# Patient Record
Sex: FEMALE | Race: BLACK OR AFRICAN AMERICAN | Employment: OTHER | URBAN - METROPOLITAN AREA
[De-identification: names, ages, dates, MRNs, and addresses within clinical notes are randomized per-mention and may not be internally consistent; named-entity substitution may affect disease eponyms.]

---

## 2023-11-12 ENCOUNTER — HOSPITAL ENCOUNTER (INPATIENT)
Age: 63
LOS: 1 days | Discharge: HOME | DRG: 113 | End: 2023-11-14
Attending: EMERGENCY MEDICINE | Admitting: INTERNAL MEDICINE
Payer: COMMERCIAL

## 2023-11-12 DIAGNOSIS — J44.1 COPD WITH ACUTE EXACERBATION (HCC): Primary | ICD-10-CM

## 2023-11-12 DIAGNOSIS — R06.89 HYPERCARBIA: ICD-10-CM

## 2023-11-12 DIAGNOSIS — D72.829 LEUKOCYTOSIS, UNSPECIFIED TYPE: ICD-10-CM

## 2023-11-12 DIAGNOSIS — E87.20 LACTIC ACIDEMIA: ICD-10-CM

## 2023-11-12 PROCEDURE — 99285 EMERGENCY DEPT VISIT HI MDM: CPT

## 2023-11-12 PROCEDURE — 96375 TX/PRO/DX INJ NEW DRUG ADDON: CPT

## 2023-11-12 PROCEDURE — 93005 ELECTROCARDIOGRAM TRACING: CPT | Performed by: STUDENT IN AN ORGANIZED HEALTH CARE EDUCATION/TRAINING PROGRAM

## 2023-11-12 PROCEDURE — 96365 THER/PROPH/DIAG IV INF INIT: CPT

## 2023-11-12 PROCEDURE — 96368 THER/DIAG CONCURRENT INF: CPT

## 2023-11-12 RX ORDER — IPRATROPIUM BROMIDE AND ALBUTEROL SULFATE 2.5; .5 MG/3ML; MG/3ML
SOLUTION RESPIRATORY (INHALATION)
Status: DISCONTINUED
Start: 2023-11-12 | End: 2023-11-13

## 2023-11-12 RX ORDER — ALBUTEROL SULFATE 2.5 MG/3ML
2.5 SOLUTION RESPIRATORY (INHALATION)
Status: COMPLETED | OUTPATIENT
Start: 2023-11-13 | End: 2023-11-13

## 2023-11-12 RX ORDER — IPRATROPIUM BROMIDE AND ALBUTEROL SULFATE 2.5; .5 MG/3ML; MG/3ML
1 SOLUTION RESPIRATORY (INHALATION) ONCE
Status: COMPLETED | OUTPATIENT
Start: 2023-11-13 | End: 2023-11-13

## 2023-11-13 ENCOUNTER — APPOINTMENT (OUTPATIENT)
Dept: CT IMAGING | Age: 63
DRG: 113 | End: 2023-11-13
Payer: COMMERCIAL

## 2023-11-13 ENCOUNTER — APPOINTMENT (OUTPATIENT)
Dept: GENERAL RADIOLOGY | Age: 63
DRG: 113 | End: 2023-11-13
Payer: COMMERCIAL

## 2023-11-13 PROBLEM — J44.1 COPD EXACERBATION (HCC): Status: ACTIVE | Noted: 2023-11-13

## 2023-11-13 LAB
ALBUMIN SERPL-MCNC: 3.9 G/DL (ref 3.4–5)
ALBUMIN SERPL-MCNC: 4 G/DL (ref 3.4–5)
ALP SERPL-CCNC: 83 U/L (ref 40–129)
ALP SERPL-CCNC: 89 U/L (ref 40–129)
ALT SERPL-CCNC: 238 U/L (ref 10–40)
ALT SERPL-CCNC: 301 U/L (ref 10–40)
ANION GAP SERPL CALCULATED.3IONS-SCNC: 15 MMOL/L (ref 3–16)
ANION GAP SERPL CALCULATED.3IONS-SCNC: 7 MMOL/L (ref 3–16)
AST SERPL-CCNC: 340 U/L (ref 15–37)
AST SERPL-CCNC: 388 U/L (ref 15–37)
BASE EXCESS BLDV CALC-SCNC: -2.6 MMOL/L (ref -2–3)
BASE EXCESS BLDV CALC-SCNC: -7 MMOL/L (ref -2–3)
BASOPHILS # BLD: 0 K/UL (ref 0–0.2)
BASOPHILS # BLD: 0 K/UL (ref 0–0.2)
BASOPHILS NFR BLD: 0 %
BASOPHILS NFR BLD: 0.1 %
BILIRUB DIRECT SERPL-MCNC: <0.2 MG/DL (ref 0–0.3)
BILIRUB DIRECT SERPL-MCNC: <0.2 MG/DL (ref 0–0.3)
BILIRUB INDIRECT SERPL-MCNC: ABNORMAL MG/DL (ref 0–1)
BILIRUB INDIRECT SERPL-MCNC: ABNORMAL MG/DL (ref 0–1)
BILIRUB SERPL-MCNC: <0.2 MG/DL (ref 0–1)
BILIRUB SERPL-MCNC: <0.2 MG/DL (ref 0–1)
BILIRUB UR QL STRIP.AUTO: NEGATIVE
BUN SERPL-MCNC: 10 MG/DL (ref 7–20)
BUN SERPL-MCNC: 13 MG/DL (ref 7–20)
CALCIUM SERPL-MCNC: 8.3 MG/DL (ref 8.3–10.6)
CALCIUM SERPL-MCNC: 8.6 MG/DL (ref 8.3–10.6)
CHLORIDE SERPL-SCNC: 104 MMOL/L (ref 99–110)
CHLORIDE SERPL-SCNC: 99 MMOL/L (ref 99–110)
CK SERPL-CCNC: 155 U/L (ref 26–192)
CK SERPL-CCNC: 199 U/L (ref 26–192)
CLARITY UR: CLEAR
CO2 BLDV-SCNC: 27 MMOL/L
CO2 BLDV-SCNC: 29 MMOL/L
CO2 SERPL-SCNC: 23 MMOL/L (ref 21–32)
CO2 SERPL-SCNC: 26 MMOL/L (ref 21–32)
COHGB MFR BLDV: 4.7 % (ref 0–1.5)
COHGB MFR BLDV: 5.4 % (ref 0–1.5)
COLOR UR: YELLOW
CREAT SERPL-MCNC: 0.7 MG/DL (ref 0.6–1.2)
CREAT SERPL-MCNC: 0.9 MG/DL (ref 0.6–1.2)
DEPRECATED RDW RBC AUTO: 18.3 % (ref 12.4–15.4)
DEPRECATED RDW RBC AUTO: 19.4 % (ref 12.4–15.4)
DO-HGB MFR BLDV: 11.6 %
DO-HGB MFR BLDV: 46.4 %
EKG ATRIAL RATE: 123 BPM
EKG DIAGNOSIS: NORMAL
EKG P AXIS: 69 DEGREES
EKG P-R INTERVAL: 148 MS
EKG Q-T INTERVAL: 304 MS
EKG QRS DURATION: 68 MS
EKG QTC CALCULATION (BAZETT): 435 MS
EKG R AXIS: 30 DEGREES
EKG T AXIS: 72 DEGREES
EKG VENTRICULAR RATE: 123 BPM
EOSINOPHIL # BLD: 0 K/UL (ref 0–0.6)
EOSINOPHIL # BLD: 0 K/UL (ref 0–0.6)
EOSINOPHIL NFR BLD: 0 %
EOSINOPHIL NFR BLD: 0.1 %
EPI CELLS #/AREA URNS HPF: NORMAL /HPF (ref 0–5)
ETHANOLAMINE SERPL-MCNC: NORMAL MG/DL (ref 0–0.08)
FLUAV RNA RESP QL NAA+PROBE: NOT DETECTED
FLUBV RNA RESP QL NAA+PROBE: NOT DETECTED
GFR SERPLBLD CREATININE-BSD FMLA CKD-EPI: >60 ML/MIN/{1.73_M2}
GFR SERPLBLD CREATININE-BSD FMLA CKD-EPI: >60 ML/MIN/{1.73_M2}
GLUCOSE BLD-MCNC: 167 MG/DL (ref 70–99)
GLUCOSE BLD-MCNC: 210 MG/DL (ref 70–99)
GLUCOSE BLD-MCNC: 213 MG/DL (ref 70–99)
GLUCOSE BLD-MCNC: 250 MG/DL (ref 70–99)
GLUCOSE SERPL-MCNC: 191 MG/DL (ref 70–99)
GLUCOSE SERPL-MCNC: 250 MG/DL (ref 70–99)
GLUCOSE UR STRIP.AUTO-MCNC: NEGATIVE MG/DL
HBV SURFACE AB SERPL IA-ACNC: <3.5 MIU/ML
HBV SURFACE AG SERPL QL IA: NORMAL
HCO3 BLDV-SCNC: 24.4 MMOL/L (ref 24–28)
HCO3 BLDV-SCNC: 26.9 MMOL/L (ref 24–28)
HCT VFR BLD AUTO: 34.8 % (ref 36–48)
HCT VFR BLD AUTO: 37.8 % (ref 36–48)
HCV AB SERPL QL IA: NORMAL
HGB BLD-MCNC: 10.8 G/DL (ref 12–16)
HGB BLD-MCNC: 11.9 G/DL (ref 12–16)
HGB UR QL STRIP.AUTO: NEGATIVE
INR PPP: 1.01 (ref 0.84–1.16)
KETONES UR STRIP.AUTO-MCNC: NEGATIVE MG/DL
LACTATE BLDV-SCNC: 2 MMOL/L (ref 0.4–2)
LACTATE BLDV-SCNC: 2.4 MMOL/L (ref 0.4–1.9)
LACTATE BLDV-SCNC: 4.9 MMOL/L (ref 0.4–1.9)
LEUKOCYTE ESTERASE UR QL STRIP.AUTO: NEGATIVE
LIPASE SERPL-CCNC: 26 U/L (ref 13–60)
LYMPHOCYTES # BLD: 0.6 K/UL (ref 1–5.1)
LYMPHOCYTES # BLD: 8.7 K/UL (ref 1–5.1)
LYMPHOCYTES NFR BLD: 4.6 %
LYMPHOCYTES NFR BLD: 57 %
MACROCYTES BLD QL SMEAR: ABNORMAL
MCH RBC QN AUTO: 23 PG (ref 26–34)
MCH RBC QN AUTO: 23.5 PG (ref 26–34)
MCHC RBC AUTO-ENTMCNC: 31.1 G/DL (ref 31–36)
MCHC RBC AUTO-ENTMCNC: 31.3 G/DL (ref 31–36)
MCV RBC AUTO: 73.9 FL (ref 80–100)
MCV RBC AUTO: 75.1 FL (ref 80–100)
METHGB MFR BLDV: 0.4 % (ref 0–1.5)
METHGB MFR BLDV: 0.7 % (ref 0–1.5)
MONOCYTES # BLD: 0.1 K/UL (ref 0–1.3)
MONOCYTES # BLD: 0.5 K/UL (ref 0–1.3)
MONOCYTES NFR BLD: 1.1 %
MONOCYTES NFR BLD: 4 %
NEUTROPHILS # BLD: 12.1 K/UL (ref 1.7–7.7)
NEUTROPHILS # BLD: 4 K/UL (ref 1.7–7.7)
NEUTROPHILS NFR BLD: 30 %
NEUTROPHILS NFR BLD: 94.1 %
NITRITE UR QL STRIP.AUTO: NEGATIVE
NT-PROBNP SERPL-MCNC: 179 PG/ML (ref 0–124)
OVALOCYTES BLD QL SMEAR: ABNORMAL
PATH INTERP BLD-IMP: NORMAL
PATH INTERP BLD-IMP: YES
PCO2 BLDV: 71.4 MMHG (ref 41–51)
PCO2 BLDV: 80.8 MMHG (ref 41–51)
PERFORMED ON: ABNORMAL
PH BLDV: 7.09 [PH] (ref 7.35–7.45)
PH BLDV: 7.18 [PH] (ref 7.35–7.45)
PH UR STRIP.AUTO: 6 [PH] (ref 5–8)
PLATELET # BLD AUTO: 250 K/UL (ref 135–450)
PLATELET # BLD AUTO: 312 K/UL (ref 135–450)
PMV BLD AUTO: 7.6 FL (ref 5–10.5)
PMV BLD AUTO: 7.6 FL (ref 5–10.5)
PO2 BLDV: 34.5 MMHG (ref 25–40)
PO2 BLDV: 72.7 MMHG (ref 25–40)
POTASSIUM SERPL-SCNC: 4.6 MMOL/L (ref 3.5–5.1)
POTASSIUM SERPL-SCNC: 4.9 MMOL/L (ref 3.5–5.1)
PROCALCITONIN SERPL IA-MCNC: 0.04 NG/ML (ref 0–0.15)
PROT SERPL-MCNC: 6.4 G/DL (ref 6.4–8.2)
PROT SERPL-MCNC: 6.5 G/DL (ref 6.4–8.2)
PROT UR STRIP.AUTO-MCNC: 30 MG/DL
PROTHROMBIN TIME: 13.3 SEC (ref 11.5–14.8)
RBC # BLD AUTO: 4.71 M/UL (ref 4–5.2)
RBC # BLD AUTO: 5.04 M/UL (ref 4–5.2)
RBC #/AREA URNS HPF: NORMAL /HPF (ref 0–4)
SAO2 % BLDV: 51 %
SAO2 % BLDV: 88 %
SARS-COV-2 RNA RESP QL NAA+PROBE: NOT DETECTED
SLIDE REVIEW: ABNORMAL
SODIUM SERPL-SCNC: 137 MMOL/L (ref 136–145)
SODIUM SERPL-SCNC: 137 MMOL/L (ref 136–145)
SP GR UR STRIP.AUTO: >=1.03 (ref 1–1.03)
TROPONIN, HIGH SENSITIVITY: 12 NG/L (ref 0–14)
TROPONIN, HIGH SENSITIVITY: 19 NG/L (ref 0–14)
UA COMPLETE W REFLEX CULTURE PNL UR: ABNORMAL
UA DIPSTICK W REFLEX MICRO PNL UR: YES
URN SPEC COLLECT METH UR: ABNORMAL
UROBILINOGEN UR STRIP-ACNC: 0.2 E.U./DL
VARIANT LYMPHS NFR BLD MANUAL: 9 % (ref 0–6)
WBC # BLD AUTO: 12.8 K/UL (ref 4–11)
WBC # BLD AUTO: 13.2 K/UL (ref 4–11)
WBC #/AREA URNS HPF: NORMAL /HPF (ref 0–5)

## 2023-11-13 PROCEDURE — 2580000003 HC RX 258: Performed by: STUDENT IN AN ORGANIZED HEALTH CARE EDUCATION/TRAINING PROGRAM

## 2023-11-13 PROCEDURE — 86803 HEPATITIS C AB TEST: CPT

## 2023-11-13 PROCEDURE — 6370000000 HC RX 637 (ALT 250 FOR IP)

## 2023-11-13 PROCEDURE — 85025 COMPLETE CBC W/AUTO DIFF WBC: CPT

## 2023-11-13 PROCEDURE — 6360000002 HC RX W HCPCS: Performed by: STUDENT IN AN ORGANIZED HEALTH CARE EDUCATION/TRAINING PROGRAM

## 2023-11-13 PROCEDURE — 36415 COLL VENOUS BLD VENIPUNCTURE: CPT

## 2023-11-13 PROCEDURE — 81001 URINALYSIS AUTO W/SCOPE: CPT

## 2023-11-13 PROCEDURE — 70450 CT HEAD/BRAIN W/O DYE: CPT

## 2023-11-13 PROCEDURE — 71045 X-RAY EXAM CHEST 1 VIEW: CPT

## 2023-11-13 PROCEDURE — 85610 PROTHROMBIN TIME: CPT

## 2023-11-13 PROCEDURE — 80076 HEPATIC FUNCTION PANEL: CPT

## 2023-11-13 PROCEDURE — 84484 ASSAY OF TROPONIN QUANT: CPT

## 2023-11-13 PROCEDURE — 82077 ASSAY SPEC XCP UR&BREATH IA: CPT

## 2023-11-13 PROCEDURE — 86706 HEP B SURFACE ANTIBODY: CPT

## 2023-11-13 PROCEDURE — 84145 PROCALCITONIN (PCT): CPT

## 2023-11-13 PROCEDURE — 83690 ASSAY OF LIPASE: CPT

## 2023-11-13 PROCEDURE — 2060000000 HC ICU INTERMEDIATE R&B

## 2023-11-13 PROCEDURE — 83880 ASSAY OF NATRIURETIC PEPTIDE: CPT

## 2023-11-13 PROCEDURE — 2700000000 HC OXYGEN THERAPY PER DAY

## 2023-11-13 PROCEDURE — 6370000000 HC RX 637 (ALT 250 FOR IP): Performed by: STUDENT IN AN ORGANIZED HEALTH CARE EDUCATION/TRAINING PROGRAM

## 2023-11-13 PROCEDURE — 82803 BLOOD GASES ANY COMBINATION: CPT

## 2023-11-13 PROCEDURE — 87040 BLOOD CULTURE FOR BACTERIA: CPT

## 2023-11-13 PROCEDURE — 83605 ASSAY OF LACTIC ACID: CPT

## 2023-11-13 PROCEDURE — 80048 BASIC METABOLIC PNL TOTAL CA: CPT

## 2023-11-13 PROCEDURE — 6370000000 HC RX 637 (ALT 250 FOR IP): Performed by: INTERNAL MEDICINE

## 2023-11-13 PROCEDURE — 94640 AIRWAY INHALATION TREATMENT: CPT

## 2023-11-13 PROCEDURE — 94761 N-INVAS EAR/PLS OXIMETRY MLT: CPT

## 2023-11-13 PROCEDURE — 86704 HEP B CORE ANTIBODY TOTAL: CPT

## 2023-11-13 PROCEDURE — 87636 SARSCOV2 & INF A&B AMP PRB: CPT

## 2023-11-13 PROCEDURE — 87340 HEPATITIS B SURFACE AG IA: CPT

## 2023-11-13 PROCEDURE — 82550 ASSAY OF CK (CPK): CPT

## 2023-11-13 RX ORDER — ACETAMINOPHEN 325 MG/1
650 TABLET ORAL ONCE
Status: COMPLETED | OUTPATIENT
Start: 2023-11-13 | End: 2023-11-13

## 2023-11-13 RX ORDER — INSULIN LISPRO 100 [IU]/ML
0-4 INJECTION, SOLUTION INTRAVENOUS; SUBCUTANEOUS NIGHTLY
Status: DISCONTINUED | OUTPATIENT
Start: 2023-11-13 | End: 2023-11-14 | Stop reason: HOSPADM

## 2023-11-13 RX ORDER — PREDNISONE 20 MG/1
40 TABLET ORAL DAILY
Status: DISCONTINUED | OUTPATIENT
Start: 2023-11-14 | End: 2023-11-14 | Stop reason: HOSPADM

## 2023-11-13 RX ORDER — MECOBALAMIN 5000 MCG
5 TABLET,DISINTEGRATING ORAL NIGHTLY
Status: DISCONTINUED | OUTPATIENT
Start: 2023-11-13 | End: 2023-11-14 | Stop reason: HOSPADM

## 2023-11-13 RX ORDER — SODIUM CHLORIDE, SODIUM LACTATE, POTASSIUM CHLORIDE, AND CALCIUM CHLORIDE .6; .31; .03; .02 G/100ML; G/100ML; G/100ML; G/100ML
1000 INJECTION, SOLUTION INTRAVENOUS ONCE
Status: COMPLETED | OUTPATIENT
Start: 2023-11-13 | End: 2023-11-13

## 2023-11-13 RX ORDER — ONDANSETRON 2 MG/ML
4 INJECTION INTRAMUSCULAR; INTRAVENOUS EVERY 6 HOURS PRN
Status: DISCONTINUED | OUTPATIENT
Start: 2023-11-13 | End: 2023-11-14 | Stop reason: HOSPADM

## 2023-11-13 RX ORDER — IPRATROPIUM BROMIDE AND ALBUTEROL SULFATE 2.5; .5 MG/3ML; MG/3ML
1 SOLUTION RESPIRATORY (INHALATION)
Status: DISCONTINUED | OUTPATIENT
Start: 2023-11-13 | End: 2023-11-14 | Stop reason: HOSPADM

## 2023-11-13 RX ORDER — POLYETHYLENE GLYCOL 3350 17 G/17G
17 POWDER, FOR SOLUTION ORAL DAILY PRN
Status: DISCONTINUED | OUTPATIENT
Start: 2023-11-13 | End: 2023-11-14 | Stop reason: HOSPADM

## 2023-11-13 RX ORDER — ONDANSETRON 4 MG/1
4 TABLET, ORALLY DISINTEGRATING ORAL EVERY 8 HOURS PRN
Status: DISCONTINUED | OUTPATIENT
Start: 2023-11-13 | End: 2023-11-14 | Stop reason: HOSPADM

## 2023-11-13 RX ORDER — ONDANSETRON 2 MG/ML
4 INJECTION INTRAMUSCULAR; INTRAVENOUS ONCE
Status: COMPLETED | OUTPATIENT
Start: 2023-11-13 | End: 2023-11-13

## 2023-11-13 RX ORDER — ARFORMOTEROL TARTRATE 15 UG/2ML
15 SOLUTION RESPIRATORY (INHALATION)
Status: DISCONTINUED | OUTPATIENT
Start: 2023-11-13 | End: 2023-11-14 | Stop reason: HOSPADM

## 2023-11-13 RX ORDER — KETOROLAC TROMETHAMINE 30 MG/ML
15 INJECTION, SOLUTION INTRAMUSCULAR; INTRAVENOUS ONCE
Status: COMPLETED | OUTPATIENT
Start: 2023-11-13 | End: 2023-11-13

## 2023-11-13 RX ORDER — ATORVASTATIN CALCIUM 40 MG/1
40 TABLET, FILM COATED ORAL DAILY
COMMUNITY
Start: 2016-04-25

## 2023-11-13 RX ORDER — DEXTROSE MONOHYDRATE 100 MG/ML
INJECTION, SOLUTION INTRAVENOUS CONTINUOUS PRN
Status: DISCONTINUED | OUTPATIENT
Start: 2023-11-13 | End: 2023-11-14 | Stop reason: HOSPADM

## 2023-11-13 RX ORDER — SODIUM CHLORIDE 9 MG/ML
INJECTION, SOLUTION INTRAVENOUS PRN
Status: DISCONTINUED | OUTPATIENT
Start: 2023-11-13 | End: 2023-11-14 | Stop reason: HOSPADM

## 2023-11-13 RX ORDER — ENOXAPARIN SODIUM 100 MG/ML
40 INJECTION SUBCUTANEOUS DAILY
Status: DISCONTINUED | OUTPATIENT
Start: 2023-11-13 | End: 2023-11-14 | Stop reason: HOSPADM

## 2023-11-13 RX ORDER — ACETAMINOPHEN 650 MG/1
650 SUPPOSITORY RECTAL EVERY 6 HOURS PRN
Status: DISCONTINUED | OUTPATIENT
Start: 2023-11-13 | End: 2023-11-14 | Stop reason: HOSPADM

## 2023-11-13 RX ORDER — INSULIN LISPRO 100 [IU]/ML
0-4 INJECTION, SOLUTION INTRAVENOUS; SUBCUTANEOUS
Status: DISCONTINUED | OUTPATIENT
Start: 2023-11-13 | End: 2023-11-14 | Stop reason: HOSPADM

## 2023-11-13 RX ORDER — ACETAMINOPHEN 325 MG/1
650 TABLET ORAL EVERY 6 HOURS PRN
Status: DISCONTINUED | OUTPATIENT
Start: 2023-11-13 | End: 2023-11-14 | Stop reason: HOSPADM

## 2023-11-13 RX ORDER — AZITHROMYCIN 250 MG/1
250 TABLET, FILM COATED ORAL DAILY
Status: DISCONTINUED | OUTPATIENT
Start: 2023-11-13 | End: 2023-11-14 | Stop reason: HOSPADM

## 2023-11-13 RX ORDER — BUDESONIDE 0.5 MG/2ML
0.5 INHALANT ORAL
Status: DISCONTINUED | OUTPATIENT
Start: 2023-11-13 | End: 2023-11-14 | Stop reason: HOSPADM

## 2023-11-13 RX ORDER — SODIUM CHLORIDE 0.9 % (FLUSH) 0.9 %
5-40 SYRINGE (ML) INJECTION PRN
Status: DISCONTINUED | OUTPATIENT
Start: 2023-11-13 | End: 2023-11-14 | Stop reason: HOSPADM

## 2023-11-13 RX ORDER — SODIUM CHLORIDE 0.9 % (FLUSH) 0.9 %
5-40 SYRINGE (ML) INJECTION EVERY 12 HOURS SCHEDULED
Status: DISCONTINUED | OUTPATIENT
Start: 2023-11-13 | End: 2023-11-14 | Stop reason: HOSPADM

## 2023-11-13 RX ORDER — MAGNESIUM SULFATE IN WATER 40 MG/ML
2000 INJECTION, SOLUTION INTRAVENOUS ONCE
Status: COMPLETED | OUTPATIENT
Start: 2023-11-13 | End: 2023-11-13

## 2023-11-13 RX ADMIN — SODIUM CHLORIDE, POTASSIUM CHLORIDE, SODIUM LACTATE AND CALCIUM CHLORIDE 1000 ML: 600; 310; 30; 20 INJECTION, SOLUTION INTRAVENOUS at 00:17

## 2023-11-13 RX ADMIN — ARFORMOTEROL TARTRATE 15 MCG: 15 SOLUTION RESPIRATORY (INHALATION) at 07:54

## 2023-11-13 RX ADMIN — BUDESONIDE 500 MCG: 0.5 SUSPENSION RESPIRATORY (INHALATION) at 07:54

## 2023-11-13 RX ADMIN — INSULIN LISPRO 1 UNITS: 100 INJECTION, SOLUTION INTRAVENOUS; SUBCUTANEOUS at 08:31

## 2023-11-13 RX ADMIN — ONDANSETRON 4 MG: 2 INJECTION INTRAMUSCULAR; INTRAVENOUS at 00:17

## 2023-11-13 RX ADMIN — KETOROLAC TROMETHAMINE 15 MG: 30 INJECTION, SOLUTION INTRAMUSCULAR; INTRAVENOUS at 01:09

## 2023-11-13 RX ADMIN — IPRATROPIUM BROMIDE AND ALBUTEROL SULFATE 1 DOSE: 2.5; .5 SOLUTION RESPIRATORY (INHALATION) at 21:25

## 2023-11-13 RX ADMIN — ARFORMOTEROL TARTRATE 15 MCG: 15 SOLUTION RESPIRATORY (INHALATION) at 21:25

## 2023-11-13 RX ADMIN — IPRATROPIUM BROMIDE AND ALBUTEROL SULFATE 1 DOSE: 2.5; .5 SOLUTION RESPIRATORY (INHALATION) at 07:54

## 2023-11-13 RX ADMIN — BUDESONIDE 500 MCG: 0.5 SUSPENSION RESPIRATORY (INHALATION) at 21:26

## 2023-11-13 RX ADMIN — IPRATROPIUM BROMIDE AND ALBUTEROL SULFATE 1 DOSE: 2.5; .5 SOLUTION RESPIRATORY (INHALATION) at 15:10

## 2023-11-13 RX ADMIN — Medication 5 MG: at 19:54

## 2023-11-13 RX ADMIN — MAGNESIUM SULFATE HEPTAHYDRATE 2000 MG: 40 INJECTION, SOLUTION INTRAVENOUS at 00:25

## 2023-11-13 RX ADMIN — SODIUM CHLORIDE, POTASSIUM CHLORIDE, SODIUM LACTATE AND CALCIUM CHLORIDE 1000 ML: 600; 310; 30; 20 INJECTION, SOLUTION INTRAVENOUS at 01:15

## 2023-11-13 RX ADMIN — ENOXAPARIN SODIUM 40 MG: 100 INJECTION SUBCUTANEOUS at 08:31

## 2023-11-13 RX ADMIN — IPRATROPIUM BROMIDE AND ALBUTEROL SULFATE 1 DOSE: 2.5; .5 SOLUTION RESPIRATORY (INHALATION) at 11:57

## 2023-11-13 RX ADMIN — ALBUTEROL SULFATE 2.5 MG: 2.5 SOLUTION RESPIRATORY (INHALATION) at 00:10

## 2023-11-13 RX ADMIN — SODIUM CHLORIDE, PRESERVATIVE FREE 10 ML: 5 INJECTION INTRAVENOUS at 08:31

## 2023-11-13 RX ADMIN — CEFEPIME 2000 MG: 2 INJECTION, POWDER, FOR SOLUTION INTRAVENOUS at 01:14

## 2023-11-13 RX ADMIN — ACETAMINOPHEN 650 MG: 325 TABLET ORAL at 01:09

## 2023-11-13 RX ADMIN — IPRATROPIUM BROMIDE AND ALBUTEROL SULFATE 1 DOSE: 2.5; .5 SOLUTION RESPIRATORY (INHALATION) at 18:15

## 2023-11-13 RX ADMIN — AZITHROMYCIN DIHYDRATE 250 MG: 250 TABLET, FILM COATED ORAL at 16:22

## 2023-11-13 RX ADMIN — AZITHROMYCIN MONOHYDRATE 500 MG: 500 INJECTION, POWDER, LYOPHILIZED, FOR SOLUTION INTRAVENOUS at 00:27

## 2023-11-13 RX ADMIN — SODIUM CHLORIDE, PRESERVATIVE FREE 10 ML: 5 INJECTION INTRAVENOUS at 19:54

## 2023-11-13 RX ADMIN — IPRATROPIUM BROMIDE AND ALBUTEROL SULFATE 1 DOSE: 2.5; .5 SOLUTION RESPIRATORY (INHALATION) at 00:07

## 2023-11-13 RX ADMIN — ALBUTEROL SULFATE 2.5 MG: 2.5 SOLUTION RESPIRATORY (INHALATION) at 00:08

## 2023-11-13 SDOH — ECONOMIC STABILITY: FOOD INSECURITY: WITHIN THE PAST 12 MONTHS, THE FOOD YOU BOUGHT JUST DIDN'T LAST AND YOU DIDN'T HAVE MONEY TO GET MORE.: NEVER TRUE

## 2023-11-13 SDOH — ECONOMIC STABILITY: TRANSPORTATION INSECURITY
IN THE PAST 12 MONTHS, HAS LACK OF TRANSPORTATION KEPT YOU FROM MEETINGS, WORK, OR FROM GETTING THINGS NEEDED FOR DAILY LIVING?: NO

## 2023-11-13 SDOH — ECONOMIC STABILITY: TRANSPORTATION INSECURITY
IN THE PAST 12 MONTHS, HAS THE LACK OF TRANSPORTATION KEPT YOU FROM MEDICAL APPOINTMENTS OR FROM GETTING MEDICATIONS?: NO

## 2023-11-13 SDOH — ECONOMIC STABILITY: FOOD INSECURITY: WITHIN THE PAST 12 MONTHS, YOU WORRIED THAT YOUR FOOD WOULD RUN OUT BEFORE YOU GOT MONEY TO BUY MORE.: NEVER TRUE

## 2023-11-13 SDOH — HEALTH STABILITY: PHYSICAL HEALTH: ON AVERAGE, HOW MANY MINUTES DO YOU ENGAGE IN EXERCISE AT THIS LEVEL?: PATIENT DECLINED

## 2023-11-13 SDOH — ECONOMIC STABILITY: INCOME INSECURITY: IN THE LAST 12 MONTHS, WAS THERE A TIME WHEN YOU WERE NOT ABLE TO PAY THE MORTGAGE OR RENT ON TIME?: NO

## 2023-11-13 SDOH — HEALTH STABILITY: PHYSICAL HEALTH
ON AVERAGE, HOW MANY DAYS PER WEEK DO YOU ENGAGE IN MODERATE TO STRENUOUS EXERCISE (LIKE A BRISK WALK)?: PATIENT DECLINED

## 2023-11-13 SDOH — ECONOMIC STABILITY: HOUSING INSECURITY
IN THE LAST 12 MONTHS, WAS THERE A TIME WHEN YOU DID NOT HAVE A STEADY PLACE TO SLEEP OR SLEPT IN A SHELTER (INCLUDING NOW)?: NO

## 2023-11-13 ASSESSMENT — SOCIAL DETERMINANTS OF HEALTH (SDOH)
HOW OFTEN DO YOU ATTEND CHURCH OR RELIGIOUS SERVICES?: PATIENT DECLINED
HOW OFTEN DO YOU GET TOGETHER WITH FRIENDS OR RELATIVES?: MORE THAN THREE TIMES A WEEK
IN A TYPICAL WEEK, HOW MANY TIMES DO YOU TALK ON THE PHONE WITH FAMILY, FRIENDS, OR NEIGHBORS?: MORE THAN THREE TIMES A WEEK
WITHIN THE LAST YEAR, HAVE TO BEEN RAPED OR FORCED TO HAVE ANY KIND OF SEXUAL ACTIVITY BY YOUR PARTNER OR EX-PARTNER?: NO
DO YOU BELONG TO ANY CLUBS OR ORGANIZATIONS SUCH AS CHURCH GROUPS UNIONS, FRATERNAL OR ATHLETIC GROUPS, OR SCHOOL GROUPS?: PATIENT DECLINED
WITHIN THE LAST YEAR, HAVE YOU BEEN AFRAID OF YOUR PARTNER OR EX-PARTNER?: NO
HOW HARD IS IT FOR YOU TO PAY FOR THE VERY BASICS LIKE FOOD, HOUSING, MEDICAL CARE, AND HEATING?: SOMEWHAT HARD
WITHIN THE LAST YEAR, HAVE YOU BEEN HUMILIATED OR EMOTIONALLY ABUSED IN OTHER WAYS BY YOUR PARTNER OR EX-PARTNER?: NO
HOW OFTEN DO YOU ATTENT MEETINGS OF THE CLUB OR ORGANIZATION YOU BELONG TO?: PATIENT DECLINED
WITHIN THE LAST YEAR, HAVE YOU BEEN KICKED, HIT, SLAPPED, OR OTHERWISE PHYSICALLY HURT BY YOUR PARTNER OR EX-PARTNER?: NO

## 2023-11-13 ASSESSMENT — PAIN DESCRIPTION - PAIN TYPE
TYPE: ACUTE PAIN
TYPE: ACUTE PAIN

## 2023-11-13 ASSESSMENT — PAIN DESCRIPTION - LOCATION
LOCATION: HEAD
LOCATION: HEAD

## 2023-11-13 ASSESSMENT — PAIN SCALES - GENERAL
PAINLEVEL_OUTOF10: 0
PAINLEVEL_OUTOF10: 6
PAINLEVEL_OUTOF10: 0
PAINLEVEL_OUTOF10: 6
PAINLEVEL_OUTOF10: 0
PAINLEVEL_OUTOF10: 10

## 2023-11-13 ASSESSMENT — PATIENT HEALTH QUESTIONNAIRE - PHQ9
2. FEELING DOWN, DEPRESSED OR HOPELESS: NOT AT ALL
SUM OF ALL RESPONSES TO PHQ9 QUESTIONS 1 & 2: 0
1. LITTLE INTEREST OR PLEASURE IN DOING THINGS: NOT AT ALL

## 2023-11-13 ASSESSMENT — PAIN DESCRIPTION - ORIENTATION: ORIENTATION: OUTER

## 2023-11-13 ASSESSMENT — PAIN - FUNCTIONAL ASSESSMENT
PAIN_FUNCTIONAL_ASSESSMENT: NONE - DENIES PAIN
PAIN_FUNCTIONAL_ASSESSMENT: ACTIVITIES ARE NOT PREVENTED

## 2023-11-13 ASSESSMENT — LIFESTYLE VARIABLES
HOW MANY STANDARD DRINKS CONTAINING ALCOHOL DO YOU HAVE ON A TYPICAL DAY: PATIENT DOES NOT DRINK
HOW OFTEN DO YOU HAVE A DRINK CONTAINING ALCOHOL: NEVER

## 2023-11-13 ASSESSMENT — PAIN DESCRIPTION - DESCRIPTORS
DESCRIPTORS: ACHING
DESCRIPTORS: ACHING

## 2023-11-13 ASSESSMENT — PAIN DESCRIPTION - FREQUENCY: FREQUENCY: CONTINUOUS

## 2023-11-13 ASSESSMENT — PAIN DESCRIPTION - ONSET: ONSET: GRADUAL

## 2023-11-13 NOTE — PROGRESS NOTES
Pt arrived to ED via EMS. Pt has hx of COPD. When EMS arrived pt was found down unresponsive. Call was make for pt by family originally for trouble breathing. EMS reports pt had agonal breathing on scene and had to be bagged. EMS states pt became responsive and started gagging in routed to ED. Pt received 125 mg of solumedrol in route. Pt now alert. Unaware of exactly what happened at home.

## 2023-11-13 NOTE — PROGRESS NOTES
Patient admitted for SOB/COPD exacerbation. A&Ox4. VSC but requires 3L NC for o2. Room safety measures in place. Assessments complete per protocol.

## 2023-11-13 NOTE — H&P
history. Review of Systems    ROS: A 10 point review of systems was conducted, significant findings as noted in HPI. Physical Exam  HENT:      Head: Normocephalic and atraumatic. Eyes:      Extraocular Movements: Extraocular movements intact. Cardiovascular:      Rate and Rhythm: Normal rate and regular rhythm. Pulmonary:      Breath sounds: Wheezing present. Abdominal:      General: Bowel sounds are normal. There is no distension. Tenderness: There is no abdominal tenderness. Musculoskeletal:         General: Normal range of motion. Cervical back: Normal range of motion. Right lower leg: No edema. Left lower leg: No edema. Neurological:      General: No focal deficit present. Mental Status: She is alert and oriented to person, place, and time. Psychiatric:         Mood and Affect: Mood normal.         Behavior: Behavior normal.       Physical exam:       Vitals:    11/13/23 0030   BP: 117/79   Pulse: (!) 104   Resp: 26   SpO2: 97%       DATA:    Labs:  CBC:   Recent Labs     11/13/23  0004   WBC 13.2*   HGB 11.9*   HCT 37.8          BMP:   Recent Labs     11/13/23  0004      K 4.9   CL 99   CO2 23   BUN 10   CREATININE 0.9   GLUCOSE 250*     LFT's:   Recent Labs     11/13/23  0105   *   *   BILITOT <0.2   ALKPHOS 83     Troponin: No results for input(s): \"TROPONINI\" in the last 72 hours. BNP:No results for input(s): \"BNP\" in the last 72 hours. ABGs: No results for input(s): \"PHART\", \"IEO1IMZ\", \"PO2ART\" in the last 72 hours. INR:   Recent Labs     11/13/23  0004   INR 1.01       U/A:No results for input(s): \"NITRITE\", \"COLORU\", \"PHUR\", \"LABCAST\", \"WBCUA\", \"RBCUA\", \"MUCUS\", \"TRICHOMONAS\", \"YEAST\", \"BACTERIA\", \"CLARITYU\", \"SPECGRAV\", \"LEUKOCYTESUR\", \"UROBILINOGEN\", \"BILIRUBINUR\", \"BLOODU\", \"GLUCOSEU\", \"AMORPHOUS\" in the last 72 hours.     Invalid input(s): \"KETONESU\"    CT HEAD WO CONTRAST   Final Result      No acute intracranial hemorrhage or

## 2023-11-13 NOTE — ED NOTES
First set cultures collected via right wrist.      Miranda Musa RN  11/13/23 1858
96% 96% 96% 96%     FiO2 (%): room air  O2 Flow Rate: O2 Device: Nasal cannula O2 Flow Rate (L/min): 3 L/min  Cardiac Rhythm:    Pain Assessment: 6/10 [x] Verbal [] Ary Deiters Scale  Pain Scale: Pain Assessment  Pain Assessment: 0-10  Pain Level: 6  Patient's Stated Pain Goal: 8  Pain Location: Head  Pain Orientation: Outer  Pain Descriptors: Aching  Functional Pain Assessment: Activities are not prevented  Pain Type: Acute pain  Pain Radiating Towards: na  Pain Frequency: Continuous  Pain Onset: Gradual  Non-Pharmaceutical Pain Intervention(s): Repositioned, Rest  Response to Pain Intervention: Patient satisfied  Side Effects: No reported side effects  Multiple Pain Sites: No  Last documented pain score (0-10 scale) Pain Level: 6  Last documented pain medication administered: 0110  Mental Status: oriented, alert, coherent, and logical  Orientation Level:    NIH Score:    C-SSRS: Risk of Suicide: No Risk  Bedside swallow:    [place] Coma Scale (GCS): Ray Coma Scale  Eye Opening: Spontaneous  Best Verbal Response: Oriented  Best Motor Response: Obeys commands  [place] Coma Scale Score: 15  Active LDA's:   Peripheral IV 11/12/23 Left;Proximal;Anterior Antecubital (Active)       Peripheral IV 11/13/23 Right Antecubital (Active)     PO Status: Regular  Pertinent or High Risk Medications/Drips: no   If Yes, please provide details: None  Pending Blood Product Administration: no       You may also review the ED PT Care Timeline found under the Summary Nursing Index tab. Recommendation    Pending orders All ER order completed  Plan for Discharge (if known):    Additional Comments: None   If any further questions, please call Sending RN at [phone]    Electronically signed by: Electronically signed by Sina Humphreys RN on 11/13/2023 at 2:47 AM      Keke Dykes  11/13/23 5579

## 2023-11-13 NOTE — PROGRESS NOTES
4 Eyes Skin Assessment     NAME:  Parveen Cortes  YOB: 1960  MEDICAL RECORD NUMBER:  [id number]    The patient is being assessed for  Admission    I agree that at least one RN has performed a thorough Head to Toe Skin Assessment on the patient. ALL assessment sites listed below have been assessed. Areas assessed by both nurses:    Head, Face, Ears, Shoulders, Back, Chest, Arms, Elbows, Hands, Sacrum. Buttock, Coccyx, Ischium, Legs. Feet and Heels, Under Medical Devices , and Other            Does the Patient have a Wound?  No noted wound(s)       Severino Prevention initiated by RN: No  Wound Care Orders initiated by RN: No    Pressure Injury (Stage 3,4, Unstageable, DTI, NWPT, and Complex wounds) if present, place Wound referral order by RN under : No    New Ostomies, if present place, Ostomy referral order under : No     Nurse 1 eSignature: Electronically signed by Tricia Hernadez RN on 11/13/23 at 3:49 AM EST    **SHARE this note so that the co-signing nurse can place an eSignature**    Nurse 2 eSignature: Electronically signed by Franklin Nathan RN on 99/64/73 at 3:36 AM EST

## 2023-11-13 NOTE — ED PROVIDER NOTES
ED Attending Attestation Note     Date of evaluation: 11/12/2023    This patient was seen by the resident. I have seen and examined the patient, agree with the workup, evaluation, management and diagnosis. The care plan has been discussed. I have reviewed the ECG and concur with the resident's interpretation. My assessment reveals complaints of shortness of breath. Patient reportedly started having shortness of breath was noted to become unresponsive so family contacted EMS. On EMS arrival, patient had agonal respirations and was bagged, but by the time she arrived to the emerge department she was awake and breathing on her own. On arrival, patient has significant diminished breath sounds bilaterally. She has normal heart sounds on exam.  Laboratory studies show respiratory acidosis. She does have a leukocytosis which is likely stress demargination. Patient was given steroids and bronchodilators with improvement of symptoms. Patient will be admitted to the hospitalist service for further management. Critical Care:  Due to the immediate potential for life-threatening deterioration due to hypercapnic respiratory failure from COPD exacerbation, I spent 40 minutes providing critical care. This time excludes time spent performing procedures but includes time spent on direct patient care, history retrieval, review of the chart, and discussions with patient, family, and consultant(s).        Ema Jernigan MD  11/13/23 4206
(PROVENTIL) (5 MG/ML) 0.5% NEBULIZER SOLUTION    Take 2.5 mg by nebulization every 6 hours as needed for Wheezing. CYCLOBENZAPRINE (FLEXERIL) 5 MG TABLET    Take 5 mg by mouth 3 times daily as needed for Muscle spasms. DICLOFENAC (VOLTAREN) 75 MG EC TABLET    Take 75 mg by mouth 2 times daily. DOCUSATE SODIUM (COLACE) 100 MG CAPSULE    Take 100 mg by mouth daily. FLUTICASONE-SALMETEROL (ADVAIR) 500-50 MCG/DOSE DISKUS INHALER    Inhale 1 puff into the lungs every 12 hours. FOLIC ACID-PYRIDOXINE-CYANOCOBALAMINE (FOLTX) 2.5-25-1 MG TABS TABLET    Take 1 tablet by mouth daily. GABAPENTIN (NEURONTIN) 100 MG CAPSULE    Take 100 mg by mouth 3 times daily. 100 mg in morning and 300 mg at night    IBUPROFEN (ADVIL;MOTRIN) 600 MG TABLET    Take 1 tablet by mouth every 6 hours as needed for Pain. METFORMIN (GLUCOPHAGE) 500 MG TABLET    Take 500 mg by mouth daily (with breakfast)    OXYCODONE-ACETAMINOPHEN (PERCOCET) 5-325 MG PER TABLET    Take 1 tablet by mouth every 4 hours as needed for Pain       Allergies     She is allergic to latex.     Physical Exam     INITIAL VITALS: BP: (!) 144/101,  , Pulse: (!) 115, Respirations: 22, SpO2: 100 %   Constitutional: 61 y.o. female,  ill-appearing  Head: normocephalic, atraumatic   Eyes: anicteric, PERRL, EOMI   ENT: no nasal discharge, mucous membranes are tacky  Neck: supple, full ROM, trachea midline   Pulmonary: expiratory wheezes bilaterally with decreased air movement   Cardiac: tachycardic rate and regular rhythm, no murmurs/rubs/gallops   Abdomen: soft, non-tender, non-distended   Extremities: warm and well-perfused, no apparent long bone deformity, no peripheral edema  Skin: no rashes or ecchymosis   Neuro: alert and oriented x3, speech normal, moves upper and lower extremities spontaneously and symmetrically, ambulates without impairment   Psych: mood and affect appropriate for situation     DiagnosticResults     EKG   Interpreted in conjunction with the

## 2023-11-13 NOTE — PROGRESS NOTES
Patient requesting inhaler. RT called. Wheezes audibile upon approaching patient. O2 saturation 92% on RA.

## 2023-11-14 VITALS
HEIGHT: 61 IN | BODY MASS INDEX: 29.22 KG/M2 | OXYGEN SATURATION: 93 % | HEART RATE: 94 BPM | WEIGHT: 154.76 LBS | RESPIRATION RATE: 18 BRPM | SYSTOLIC BLOOD PRESSURE: 154 MMHG | TEMPERATURE: 98 F | DIASTOLIC BLOOD PRESSURE: 93 MMHG

## 2023-11-14 LAB
ALBUMIN SERPL-MCNC: 3.9 G/DL (ref 3.4–5)
ALP SERPL-CCNC: 70 U/L (ref 40–129)
ALT SERPL-CCNC: 180 U/L (ref 10–40)
ANION GAP SERPL CALCULATED.3IONS-SCNC: 7 MMOL/L (ref 3–16)
AST SERPL-CCNC: 92 U/L (ref 15–37)
BASOPHILS # BLD: 0 K/UL (ref 0–0.2)
BASOPHILS NFR BLD: 0.2 %
BILIRUB DIRECT SERPL-MCNC: <0.2 MG/DL (ref 0–0.3)
BILIRUB INDIRECT SERPL-MCNC: ABNORMAL MG/DL (ref 0–1)
BILIRUB SERPL-MCNC: <0.2 MG/DL (ref 0–1)
BUN SERPL-MCNC: 15 MG/DL (ref 7–20)
CALCIUM SERPL-MCNC: 8.6 MG/DL (ref 8.3–10.6)
CHLORIDE SERPL-SCNC: 104 MMOL/L (ref 99–110)
CO2 SERPL-SCNC: 29 MMOL/L (ref 21–32)
CREAT SERPL-MCNC: 0.7 MG/DL (ref 0.6–1.2)
DEPRECATED RDW RBC AUTO: 18.7 % (ref 12.4–15.4)
EOSINOPHIL # BLD: 0.1 K/UL (ref 0–0.6)
EOSINOPHIL NFR BLD: 0.7 %
GFR SERPLBLD CREATININE-BSD FMLA CKD-EPI: >60 ML/MIN/{1.73_M2}
GLUCOSE BLD-MCNC: 136 MG/DL (ref 70–99)
GLUCOSE BLD-MCNC: 152 MG/DL (ref 70–99)
GLUCOSE SERPL-MCNC: 127 MG/DL (ref 70–99)
HBV CORE AB SERPL QL IA: NEGATIVE
HCT VFR BLD AUTO: 32 % (ref 36–48)
HGB BLD-MCNC: 10.1 G/DL (ref 12–16)
LYMPHOCYTES # BLD: 3.2 K/UL (ref 1–5.1)
LYMPHOCYTES NFR BLD: 39.2 %
MCH RBC QN AUTO: 23.5 PG (ref 26–34)
MCHC RBC AUTO-ENTMCNC: 31.6 G/DL (ref 31–36)
MCV RBC AUTO: 74.4 FL (ref 80–100)
MONOCYTES # BLD: 0.8 K/UL (ref 0–1.3)
MONOCYTES NFR BLD: 9.7 %
NEUTROPHILS # BLD: 4.1 K/UL (ref 1.7–7.7)
NEUTROPHILS NFR BLD: 50.2 %
PERFORMED ON: ABNORMAL
PERFORMED ON: ABNORMAL
PLATELET # BLD AUTO: 241 K/UL (ref 135–450)
PMV BLD AUTO: 7.8 FL (ref 5–10.5)
POTASSIUM SERPL-SCNC: 4.5 MMOL/L (ref 3.5–5.1)
PROT SERPL-MCNC: 6.2 G/DL (ref 6.4–8.2)
RBC # BLD AUTO: 4.3 M/UL (ref 4–5.2)
SODIUM SERPL-SCNC: 140 MMOL/L (ref 136–145)
TROPONIN, HIGH SENSITIVITY: 28 NG/L (ref 0–14)
WBC # BLD AUTO: 8.1 K/UL (ref 4–11)

## 2023-11-14 PROCEDURE — 6370000000 HC RX 637 (ALT 250 FOR IP)

## 2023-11-14 PROCEDURE — 6370000000 HC RX 637 (ALT 250 FOR IP): Performed by: INTERNAL MEDICINE

## 2023-11-14 PROCEDURE — 85025 COMPLETE CBC W/AUTO DIFF WBC: CPT

## 2023-11-14 PROCEDURE — 6360000002 HC RX W HCPCS: Performed by: STUDENT IN AN ORGANIZED HEALTH CARE EDUCATION/TRAINING PROGRAM

## 2023-11-14 PROCEDURE — 94640 AIRWAY INHALATION TREATMENT: CPT

## 2023-11-14 PROCEDURE — 36415 COLL VENOUS BLD VENIPUNCTURE: CPT

## 2023-11-14 PROCEDURE — 80076 HEPATIC FUNCTION PANEL: CPT

## 2023-11-14 PROCEDURE — 80048 BASIC METABOLIC PNL TOTAL CA: CPT

## 2023-11-14 PROCEDURE — 2580000003 HC RX 258: Performed by: STUDENT IN AN ORGANIZED HEALTH CARE EDUCATION/TRAINING PROGRAM

## 2023-11-14 PROCEDURE — 2700000000 HC OXYGEN THERAPY PER DAY

## 2023-11-14 PROCEDURE — 6370000000 HC RX 637 (ALT 250 FOR IP): Performed by: STUDENT IN AN ORGANIZED HEALTH CARE EDUCATION/TRAINING PROGRAM

## 2023-11-14 PROCEDURE — 94761 N-INVAS EAR/PLS OXIMETRY MLT: CPT

## 2023-11-14 PROCEDURE — 84484 ASSAY OF TROPONIN QUANT: CPT

## 2023-11-14 RX ORDER — PREDNISONE 10 MG/1
TABLET ORAL
Qty: 20 TABLET | Refills: 0 | Status: SHIPPED | OUTPATIENT
Start: 2023-11-14

## 2023-11-14 RX ORDER — CALCIUM CARBONATE 500 MG/1
500 TABLET, CHEWABLE ORAL ONCE
Status: COMPLETED | OUTPATIENT
Start: 2023-11-14 | End: 2023-11-14

## 2023-11-14 RX ORDER — AZITHROMYCIN 250 MG/1
250 TABLET, FILM COATED ORAL DAILY
Qty: 2 TABLET | Refills: 0 | Status: SHIPPED | OUTPATIENT
Start: 2023-11-15 | End: 2023-11-17

## 2023-11-14 RX ORDER — ACETAMINOPHEN 325 MG/1
650 TABLET ORAL EVERY 6 HOURS PRN
Qty: 60 TABLET | Refills: 0 | Status: SHIPPED | OUTPATIENT
Start: 2023-11-14 | End: 2023-11-24

## 2023-11-14 RX ADMIN — BUDESONIDE 500 MCG: 0.5 SUSPENSION RESPIRATORY (INHALATION) at 06:07

## 2023-11-14 RX ADMIN — ENOXAPARIN SODIUM 40 MG: 100 INJECTION SUBCUTANEOUS at 08:39

## 2023-11-14 RX ADMIN — BENZOCAINE 6 MG-MENTHOL 10 MG LOZENGES 1 LOZENGE: at 09:58

## 2023-11-14 RX ADMIN — ANTACID TABLETS 500 MG: 500 TABLET, CHEWABLE ORAL at 04:54

## 2023-11-14 RX ADMIN — IPRATROPIUM BROMIDE AND ALBUTEROL SULFATE 1 DOSE: 2.5; .5 SOLUTION RESPIRATORY (INHALATION) at 06:07

## 2023-11-14 RX ADMIN — SODIUM CHLORIDE, PRESERVATIVE FREE 10 ML: 5 INJECTION INTRAVENOUS at 08:39

## 2023-11-14 RX ADMIN — AZITHROMYCIN DIHYDRATE 250 MG: 250 TABLET, FILM COATED ORAL at 08:39

## 2023-11-14 RX ADMIN — PREDNISONE 40 MG: 20 TABLET ORAL at 08:39

## 2023-11-14 RX ADMIN — BENZOCAINE 6 MG-MENTHOL 10 MG LOZENGES 1 LOZENGE: at 04:54

## 2023-11-14 RX ADMIN — ARFORMOTEROL TARTRATE 15 MCG: 15 SOLUTION RESPIRATORY (INHALATION) at 06:07

## 2023-11-14 RX ADMIN — IPRATROPIUM BROMIDE AND ALBUTEROL SULFATE 1 DOSE: 2.5; .5 SOLUTION RESPIRATORY (INHALATION) at 11:03

## 2023-11-14 ASSESSMENT — PAIN SCALES - GENERAL: PAINLEVEL_OUTOF10: 0

## 2023-11-14 NOTE — PROGRESS NOTES
Pt discharged per order. Discharge instructions reviewed with patient. PIV removed with no complications and telebox brought to . Meds to Beds picked up by patient. Pt wheeled to private vehicle via wheelchair.

## 2023-11-14 NOTE — CARE COORDINATION
Discharge order noted. Pt on room air. No CM needs identified.      Behzad Mccoy RN, BSN, [facility]  Case Management Department  [phone]
type [V01.973]    IF APPLICABLE: The Patient and/or patient representative Doyle Dover and her family were provided with a choice of provider and agrees with the discharge plan. Freedom of choice list with basic dialogue that supports the patient's individualized plan of care/goals and shares the quality data associated with the providers was provided to: Patient   Patient Representative Name:       The Patient and/or Patient Representative Agree with the Discharge Plan?  Yes    Care Transition Patient: No    IMM Status:      Avila Chilel RN  Case Management Department  Ph: [phone] Fax: [phone]

## 2023-11-14 NOTE — PLAN OF CARE
Problem: Discharge Planning  Goal: Discharge to home or other facility with appropriate resources  11/13/2023 1603 by Renata Rousseau RN  Outcome: Progressing     Problem: Safety - Adult  Goal: Free from fall injury  11/13/2023 2114 by Genaro Cruz RN  Outcome: Progressing  11/13/2023 1603 by Renata Rousseau RN  Outcome: Progressing

## 2023-11-14 NOTE — DISCHARGE INSTRUCTIONS
F/u hospital discharge with PCP in 1 week. Continue taking Azithromycin at home as directed for sore throat. Continue cepacol lozenges for sore throat relief. Continue taking Prednisone taper as directed. It is essential to take prednisone as directed and decrease the dose gradually. You may Take tylenol as needed for fever/pain.     Seek medical attention if symptoms worsen/ persist.

## 2023-11-17 LAB
BACTERIA BLD CULT ORG #2: NORMAL
BACTERIA BLD CULT: NORMAL

## 2023-11-30 ENCOUNTER — HOSPITAL ENCOUNTER (EMERGENCY)
Age: 63
Discharge: HOME | End: 2023-11-30
Attending: EMERGENCY MEDICINE
Payer: COMMERCIAL

## 2023-11-30 ENCOUNTER — APPOINTMENT (OUTPATIENT)
Dept: GENERAL RADIOLOGY | Age: 63
End: 2023-11-30
Payer: COMMERCIAL

## 2023-11-30 VITALS
HEART RATE: 110 BPM | OXYGEN SATURATION: 91 % | SYSTOLIC BLOOD PRESSURE: 172 MMHG | RESPIRATION RATE: 20 BRPM | TEMPERATURE: 98 F | DIASTOLIC BLOOD PRESSURE: 95 MMHG

## 2023-11-30 DIAGNOSIS — J44.1 COPD EXACERBATION (HCC): Primary | ICD-10-CM

## 2023-11-30 LAB
ALBUMIN SERPL-MCNC: 4.4 G/DL (ref 3.4–5)
ALBUMIN/GLOB SERPL: 1.7 {RATIO} (ref 1.1–2.2)
ALP SERPL-CCNC: 72 U/L (ref 40–129)
ALT SERPL-CCNC: 14 U/L (ref 10–40)
ANION GAP SERPL CALCULATED.3IONS-SCNC: 10 MMOL/L (ref 3–16)
AST SERPL-CCNC: 17 U/L (ref 15–37)
BASOPHILS # BLD: 0 K/UL (ref 0–0.2)
BASOPHILS NFR BLD: 0.6 %
BILIRUB SERPL-MCNC: <0.2 MG/DL (ref 0–1)
BUN SERPL-MCNC: 8 MG/DL (ref 7–20)
CALCIUM SERPL-MCNC: 9.3 MG/DL (ref 8.3–10.6)
CHLORIDE SERPL-SCNC: 102 MMOL/L (ref 99–110)
CO2 SERPL-SCNC: 25 MMOL/L (ref 21–32)
CREAT SERPL-MCNC: 0.7 MG/DL (ref 0.6–1.2)
DEPRECATED RDW RBC AUTO: 18.6 % (ref 12.4–15.4)
EKG ATRIAL RATE: 97 BPM
EKG DIAGNOSIS: NORMAL
EKG P AXIS: 81 DEGREES
EKG P-R INTERVAL: 138 MS
EKG Q-T INTERVAL: 314 MS
EKG QRS DURATION: 72 MS
EKG QTC CALCULATION (BAZETT): 398 MS
EKG R AXIS: 45 DEGREES
EKG T AXIS: 57 DEGREES
EKG VENTRICULAR RATE: 97 BPM
EOSINOPHIL # BLD: 0.2 K/UL (ref 0–0.6)
EOSINOPHIL NFR BLD: 3.4 %
GFR SERPLBLD CREATININE-BSD FMLA CKD-EPI: >60 ML/MIN/{1.73_M2}
GLUCOSE SERPL-MCNC: 144 MG/DL (ref 70–99)
HCT VFR BLD AUTO: 37.2 % (ref 36–48)
HGB BLD-MCNC: 11.8 G/DL (ref 12–16)
LYMPHOCYTES # BLD: 1.6 K/UL (ref 1–5.1)
LYMPHOCYTES NFR BLD: 26.7 %
MAGNESIUM SERPL-MCNC: 1.8 MG/DL (ref 1.8–2.4)
MCH RBC QN AUTO: 23.3 PG (ref 26–34)
MCHC RBC AUTO-ENTMCNC: 31.6 G/DL (ref 31–36)
MCV RBC AUTO: 73.8 FL (ref 80–100)
MONOCYTES # BLD: 0.7 K/UL (ref 0–1.3)
MONOCYTES NFR BLD: 10.7 %
NEUTROPHILS # BLD: 3.6 K/UL (ref 1.7–7.7)
NEUTROPHILS NFR BLD: 58.6 %
NT-PROBNP SERPL-MCNC: 107 PG/ML (ref 0–124)
PLATELET # BLD AUTO: 301 K/UL (ref 135–450)
PMV BLD AUTO: 7.5 FL (ref 5–10.5)
POTASSIUM SERPL-SCNC: 4 MMOL/L (ref 3.5–5.1)
PROT SERPL-MCNC: 7 G/DL (ref 6.4–8.2)
RBC # BLD AUTO: 5.05 M/UL (ref 4–5.2)
SARS-COV-2 RDRP RESP QL NAA+PROBE: NOT DETECTED
SODIUM SERPL-SCNC: 137 MMOL/L (ref 136–145)
TROPONIN, HIGH SENSITIVITY: 16 NG/L (ref 0–14)
TROPONIN, HIGH SENSITIVITY: 17 NG/L (ref 0–14)
WBC # BLD AUTO: 6.1 K/UL (ref 4–11)

## 2023-11-30 PROCEDURE — 80053 COMPREHEN METABOLIC PANEL: CPT

## 2023-11-30 PROCEDURE — 36415 COLL VENOUS BLD VENIPUNCTURE: CPT

## 2023-11-30 PROCEDURE — 99285 EMERGENCY DEPT VISIT HI MDM: CPT

## 2023-11-30 PROCEDURE — 93005 ELECTROCARDIOGRAM TRACING: CPT | Performed by: EMERGENCY MEDICINE

## 2023-11-30 PROCEDURE — 83735 ASSAY OF MAGNESIUM: CPT

## 2023-11-30 PROCEDURE — 94761 N-INVAS EAR/PLS OXIMETRY MLT: CPT

## 2023-11-30 PROCEDURE — 84484 ASSAY OF TROPONIN QUANT: CPT

## 2023-11-30 PROCEDURE — 85025 COMPLETE CBC W/AUTO DIFF WBC: CPT

## 2023-11-30 PROCEDURE — 83880 ASSAY OF NATRIURETIC PEPTIDE: CPT

## 2023-11-30 PROCEDURE — 87635 SARS-COV-2 COVID-19 AMP PRB: CPT

## 2023-11-30 PROCEDURE — 6370000000 HC RX 637 (ALT 250 FOR IP): Performed by: EMERGENCY MEDICINE

## 2023-11-30 PROCEDURE — 94640 AIRWAY INHALATION TREATMENT: CPT

## 2023-11-30 PROCEDURE — 71045 X-RAY EXAM CHEST 1 VIEW: CPT

## 2023-11-30 PROCEDURE — 6360000002 HC RX W HCPCS: Performed by: EMERGENCY MEDICINE

## 2023-11-30 RX ORDER — AZITHROMYCIN 250 MG/1
TABLET, FILM COATED ORAL
Qty: 1 PACKET | Refills: 0 | Status: ON HOLD | OUTPATIENT
Start: 2023-11-30 | End: 2023-12-10

## 2023-11-30 RX ORDER — ALBUTEROL SULFATE 90 UG/1
2 AEROSOL, METERED RESPIRATORY (INHALATION) EVERY 6 HOURS PRN
Qty: 18 G | Refills: 3 | Status: ON HOLD | OUTPATIENT
Start: 2023-11-30

## 2023-11-30 RX ORDER — IPRATROPIUM BROMIDE AND ALBUTEROL SULFATE 2.5; .5 MG/3ML; MG/3ML
1 SOLUTION RESPIRATORY (INHALATION)
Status: COMPLETED | OUTPATIENT
Start: 2023-11-30 | End: 2023-11-30

## 2023-11-30 RX ORDER — ALBUTEROL SULFATE 2.5 MG/3ML
2.5 SOLUTION RESPIRATORY (INHALATION) ONCE
Status: COMPLETED | OUTPATIENT
Start: 2023-11-30 | End: 2023-11-30

## 2023-11-30 RX ORDER — PREDNISONE 20 MG/1
60 TABLET ORAL ONCE
Status: COMPLETED | OUTPATIENT
Start: 2023-11-30 | End: 2023-11-30

## 2023-11-30 RX ORDER — PREDNISONE 10 MG/1
TABLET ORAL
Qty: 20 TABLET | Refills: 0 | Status: ON HOLD | OUTPATIENT
Start: 2023-11-30 | End: 2023-12-10

## 2023-11-30 RX ADMIN — ALBUTEROL SULFATE 2.5 MG: 2.5 SOLUTION RESPIRATORY (INHALATION) at 17:08

## 2023-11-30 RX ADMIN — IPRATROPIUM BROMIDE AND ALBUTEROL SULFATE 1 DOSE: 2.5; .5 SOLUTION RESPIRATORY (INHALATION) at 16:18

## 2023-11-30 RX ADMIN — ALBUTEROL SULFATE 2.5 MG: 2.5 SOLUTION RESPIRATORY (INHALATION) at 17:11

## 2023-11-30 RX ADMIN — PREDNISONE 60 MG: 20 TABLET ORAL at 16:24

## 2023-11-30 ASSESSMENT — ENCOUNTER SYMPTOMS
SORE THROAT: 1
EYE PAIN: 0
VOICE CHANGE: 0
COUGH: 1
DIARRHEA: 0
WHEEZING: 1
NAUSEA: 0
SHORTNESS OF BREATH: 1
TROUBLE SWALLOWING: 0
ABDOMINAL PAIN: 0
VOMITING: 0

## 2023-11-30 ASSESSMENT — PAIN - FUNCTIONAL ASSESSMENT: PAIN_FUNCTIONAL_ASSESSMENT: NONE - DENIES PAIN

## 2023-12-02 ENCOUNTER — APPOINTMENT (OUTPATIENT)
Dept: GENERAL RADIOLOGY | Age: 63
End: 2023-12-02
Payer: COMMERCIAL

## 2023-12-02 ENCOUNTER — HOSPITAL ENCOUNTER (INPATIENT)
Age: 63
LOS: 9 days | Discharge: HOME | End: 2023-12-11
Attending: STUDENT IN AN ORGANIZED HEALTH CARE EDUCATION/TRAINING PROGRAM | Admitting: STUDENT IN AN ORGANIZED HEALTH CARE EDUCATION/TRAINING PROGRAM
Payer: COMMERCIAL

## 2023-12-02 DIAGNOSIS — R79.89 ELEVATED TROPONIN: ICD-10-CM

## 2023-12-02 DIAGNOSIS — J44.1 COPD EXACERBATION (HCC): Primary | ICD-10-CM

## 2023-12-02 DIAGNOSIS — R52 PAIN: ICD-10-CM

## 2023-12-02 DIAGNOSIS — J44.1 CHRONIC OBSTRUCTIVE PULMONARY DISEASE WITH ACUTE EXACERBATION (HCC): ICD-10-CM

## 2023-12-02 DIAGNOSIS — J44.1 COPD WITH ACUTE EXACERBATION (HCC): ICD-10-CM

## 2023-12-02 DIAGNOSIS — R09.02 HYPOXIA: ICD-10-CM

## 2023-12-02 PROBLEM — J44.9 COPD (CHRONIC OBSTRUCTIVE PULMONARY DISEASE) (HCC): Status: ACTIVE | Noted: 2023-12-02

## 2023-12-02 PROBLEM — J96.02 ACUTE HYPERCAPNIC RESPIRATORY FAILURE (HCC): Status: ACTIVE | Noted: 2023-12-02

## 2023-12-02 LAB
ALBUMIN SERPL-MCNC: 5 G/DL (ref 3.4–5)
ALP SERPL-CCNC: 86 U/L (ref 40–129)
ALT SERPL-CCNC: 156 U/L (ref 10–40)
ANION GAP SERPL CALCULATED.3IONS-SCNC: 9 MMOL/L (ref 3–16)
AST SERPL-CCNC: 143 U/L (ref 15–37)
BASE EXCESS BLDA CALC-SCNC: -2.4 MMOL/L (ref -3–3)
BASE EXCESS BLDA CALC-SCNC: 2 MMOL/L (ref -3–3)
BASE EXCESS BLDV CALC-SCNC: -0.3 MMOL/L (ref -2–3)
BASE EXCESS BLDV CALC-SCNC: -2.1 MMOL/L (ref -2–3)
BASOPHILS # BLD: 0 K/UL (ref 0–0.2)
BASOPHILS NFR BLD: 0.3 %
BILIRUB DIRECT SERPL-MCNC: <0.2 MG/DL (ref 0–0.3)
BILIRUB INDIRECT SERPL-MCNC: ABNORMAL MG/DL (ref 0–1)
BILIRUB SERPL-MCNC: 0.4 MG/DL (ref 0–1)
BUN SERPL-MCNC: 14 MG/DL (ref 7–20)
CA-I BLD-SCNC: 1.24 MMOL/L (ref 1.12–1.32)
CALCIUM SERPL-MCNC: 9.9 MG/DL (ref 8.3–10.6)
CHLORIDE SERPL-SCNC: 100 MMOL/L (ref 99–110)
CO2 BLDA-SCNC: 32 MMOL/L
CO2 BLDA-SCNC: 33 MMOL/L
CO2 BLDV-SCNC: 34 MMOL/L
CO2 BLDV-SCNC: 34 MMOL/L
CO2 SERPL-SCNC: 32 MMOL/L (ref 21–32)
COHGB MFR BLDA: 1.4 % (ref 0–1.5)
COHGB MFR BLDV: 2.1 % (ref 0–1.5)
COHGB MFR BLDV: 2.5 % (ref 0–1.5)
CREAT SERPL-MCNC: 0.8 MG/DL (ref 0.6–1.2)
DEPRECATED RDW RBC AUTO: 18.8 % (ref 12.4–15.4)
EKG ATRIAL RATE: 144 BPM
EKG DIAGNOSIS: NORMAL
EKG P AXIS: 78 DEGREES
EKG P-R INTERVAL: 134 MS
EKG Q-T INTERVAL: 278 MS
EKG QRS DURATION: 68 MS
EKG QTC CALCULATION (BAZETT): 430 MS
EKG R AXIS: 43 DEGREES
EKG T AXIS: 62 DEGREES
EKG VENTRICULAR RATE: 144 BPM
EOSINOPHIL # BLD: 0 K/UL (ref 0–0.6)
EOSINOPHIL NFR BLD: 0 %
FLUAV RNA UPPER RESP QL NAA+PROBE: NEGATIVE
FLUBV AG NPH QL: NEGATIVE
GFR SERPLBLD CREATININE-BSD FMLA CKD-EPI: >60 ML/MIN/{1.73_M2}
GLUCOSE BLD-MCNC: 181 MG/DL (ref 70–99)
GLUCOSE BLD-MCNC: 182 MG/DL (ref 70–99)
GLUCOSE BLD-MCNC: 192 MG/DL (ref 70–99)
GLUCOSE SERPL-MCNC: 164 MG/DL (ref 70–99)
HCO3 BLDA-SCNC: 30 MMOL/L (ref 21–29)
HCO3 BLDA-SCNC: 30.2 MMOL/L (ref 21–29)
HCO3 BLDV-SCNC: 30.6 MMOL/L (ref 24–28)
HCO3 BLDV-SCNC: 30.9 MMOL/L (ref 24–28)
HCT VFR BLD AUTO: 43.3 % (ref 36–48)
HGB BLD-MCNC: 13 G/DL (ref 12–16)
HGB BLDA-MCNC: 12.4 G/DL
LACTATE BLD-SCNC: 2.67 MMOL/L (ref 0.4–2)
LACTATE BLDV-SCNC: 1.9 MMOL/L (ref 0.4–1.9)
LYMPHOCYTES # BLD: 1.8 K/UL (ref 1–5.1)
LYMPHOCYTES NFR BLD: 15.3 %
MCH RBC QN AUTO: 22.6 PG (ref 26–34)
MCHC RBC AUTO-ENTMCNC: 30.1 G/DL (ref 31–36)
MCV RBC AUTO: 75.1 FL (ref 80–100)
METHGB MFR BLDA: 0.4 % (ref 0–1.4)
METHGB MFR BLDV: 0.3 % (ref 0–1.5)
METHGB MFR BLDV: 0.4 % (ref 0–1.5)
MONOCYTES # BLD: 1.2 K/UL (ref 0–1.3)
MONOCYTES NFR BLD: 9.6 %
NEUTROPHILS # BLD: 9 K/UL (ref 1.7–7.7)
NEUTROPHILS NFR BLD: 74.8 %
NT-PROBNP SERPL-MCNC: 1040 PG/ML (ref 0–124)
PCO2 BLDA: 84.1 MM HG (ref 35–45)
PCO2 BLDA: 94.9 MMHG (ref 35–45)
PCO2 BLDV: 86.4 MMHG (ref 41–51)
PCO2 BLDV: >98 MMHG (ref 41–51)
PERFORMED ON: ABNORMAL
PH BLDA: 7.1 [PH] (ref 7.35–7.45)
PH BLDA: 7.16 [PH] (ref 7.35–7.45)
PH BLDV: 7.08 [PH] (ref 7.35–7.45)
PH BLDV: 7.16 [PH] (ref 7.35–7.45)
PLATELET # BLD AUTO: 408 K/UL (ref 135–450)
PMV BLD AUTO: 8.2 FL (ref 5–10.5)
PO2 BLDA: 395 MMHG (ref 75–108)
PO2 BLDA: 411.8 MM HG (ref 75–108)
PO2 BLDV: 55.5 MMHG (ref 25–40)
PO2 BLDV: 70.4 MMHG (ref 25–40)
POC SAMPLE TYPE: ABNORMAL
POTASSIUM BLD-SCNC: 5.3 MMOL/L (ref 3.5–5.1)
POTASSIUM SERPL-SCNC: 5.4 MMOL/L (ref 3.5–5.1)
PROCALCITONIN SERPL IA-MCNC: 0.14 NG/ML (ref 0–0.15)
PROT SERPL-MCNC: 8 G/DL (ref 6.4–8.2)
RBC # BLD AUTO: 5.77 M/UL (ref 4–5.2)
SAO2 % BLDA: 100 % (ref 93–100)
SAO2 % BLDA: 100 % (ref 93–100)
SAO2 % BLDV: 78 %
SAO2 % BLDV: 85 %
SARS-COV-2 RDRP RESP QL NAA+PROBE: NOT DETECTED
SODIUM BLD-SCNC: 139 MMOL/L (ref 136–145)
SODIUM SERPL-SCNC: 141 MMOL/L (ref 136–145)
TROPONIN, HIGH SENSITIVITY: 165 NG/L (ref 0–14)
TROPONIN, HIGH SENSITIVITY: 177 NG/L (ref 0–14)
WBC # BLD AUTO: 12 K/UL (ref 4–11)

## 2023-12-02 PROCEDURE — 83605 ASSAY OF LACTIC ACID: CPT

## 2023-12-02 PROCEDURE — 83036 HEMOGLOBIN GLYCOSYLATED A1C: CPT

## 2023-12-02 PROCEDURE — 2580000003 HC RX 258: Performed by: PHYSICIAN ASSISTANT

## 2023-12-02 PROCEDURE — 2580000003 HC RX 258

## 2023-12-02 PROCEDURE — 6360000002 HC RX W HCPCS: Performed by: INTERNAL MEDICINE

## 2023-12-02 PROCEDURE — 84484 ASSAY OF TROPONIN QUANT: CPT

## 2023-12-02 PROCEDURE — 99291 CRITICAL CARE FIRST HOUR: CPT | Performed by: INTERNAL MEDICINE

## 2023-12-02 PROCEDURE — 83880 ASSAY OF NATRIURETIC PEPTIDE: CPT

## 2023-12-02 PROCEDURE — 96375 TX/PRO/DX INJ NEW DRUG ADDON: CPT

## 2023-12-02 PROCEDURE — 6360000002 HC RX W HCPCS: Performed by: STUDENT IN AN ORGANIZED HEALTH CARE EDUCATION/TRAINING PROGRAM

## 2023-12-02 PROCEDURE — 85025 COMPLETE CBC W/AUTO DIFF WBC: CPT

## 2023-12-02 PROCEDURE — 82330 ASSAY OF CALCIUM: CPT

## 2023-12-02 PROCEDURE — 82947 ASSAY GLUCOSE BLOOD QUANT: CPT

## 2023-12-02 PROCEDURE — 6360000002 HC RX W HCPCS: Performed by: PHYSICIAN ASSISTANT

## 2023-12-02 PROCEDURE — 87077 CULTURE AEROBIC IDENTIFY: CPT

## 2023-12-02 PROCEDURE — 6370000000 HC RX 637 (ALT 250 FOR IP): Performed by: PHYSICIAN ASSISTANT

## 2023-12-02 PROCEDURE — 96372 THER/PROPH/DIAG INJ SC/IM: CPT

## 2023-12-02 PROCEDURE — 94640 AIRWAY INHALATION TREATMENT: CPT

## 2023-12-02 PROCEDURE — 36600 WITHDRAWAL OF ARTERIAL BLOOD: CPT

## 2023-12-02 PROCEDURE — 93005 ELECTROCARDIOGRAM TRACING: CPT | Performed by: PHYSICIAN ASSISTANT

## 2023-12-02 PROCEDURE — 71045 X-RAY EXAM CHEST 1 VIEW: CPT

## 2023-12-02 PROCEDURE — 80076 HEPATIC FUNCTION PANEL: CPT

## 2023-12-02 PROCEDURE — 2000000000 HC ICU R&B

## 2023-12-02 PROCEDURE — 80048 BASIC METABOLIC PNL TOTAL CA: CPT

## 2023-12-02 PROCEDURE — 94761 N-INVAS EAR/PLS OXIMETRY MLT: CPT

## 2023-12-02 PROCEDURE — 2500000003 HC RX 250 WO HCPCS: Performed by: INTERNAL MEDICINE

## 2023-12-02 PROCEDURE — 94660 CPAP INITIATION&MGMT: CPT

## 2023-12-02 PROCEDURE — 6370000000 HC RX 637 (ALT 250 FOR IP): Performed by: INTERNAL MEDICINE

## 2023-12-02 PROCEDURE — 82803 BLOOD GASES ANY COMBINATION: CPT

## 2023-12-02 PROCEDURE — 31500 INSERT EMERGENCY AIRWAY: CPT

## 2023-12-02 PROCEDURE — 87635 SARS-COV-2 COVID-19 AMP PRB: CPT

## 2023-12-02 PROCEDURE — 96365 THER/PROPH/DIAG IV INF INIT: CPT

## 2023-12-02 PROCEDURE — 87804 INFLUENZA ASSAY W/OPTIC: CPT

## 2023-12-02 PROCEDURE — 99285 EMERGENCY DEPT VISIT HI MDM: CPT

## 2023-12-02 PROCEDURE — 84145 PROCALCITONIN (PCT): CPT

## 2023-12-02 PROCEDURE — 87205 SMEAR GRAM STAIN: CPT

## 2023-12-02 PROCEDURE — 31500 INSERT EMERGENCY AIRWAY: CPT | Performed by: INTERNAL MEDICINE

## 2023-12-02 PROCEDURE — 94002 VENT MGMT INPAT INIT DAY: CPT

## 2023-12-02 PROCEDURE — 96361 HYDRATE IV INFUSION ADD-ON: CPT

## 2023-12-02 PROCEDURE — 2700000000 HC OXYGEN THERAPY PER DAY

## 2023-12-02 PROCEDURE — 87070 CULTURE OTHR SPECIMN AEROBIC: CPT

## 2023-12-02 PROCEDURE — 74018 RADEX ABDOMEN 1 VIEW: CPT

## 2023-12-02 PROCEDURE — 84295 ASSAY OF SERUM SODIUM: CPT

## 2023-12-02 PROCEDURE — 84132 ASSAY OF SERUM POTASSIUM: CPT

## 2023-12-02 PROCEDURE — 6360000002 HC RX W HCPCS

## 2023-12-02 PROCEDURE — 2580000003 HC RX 258: Performed by: STUDENT IN AN ORGANIZED HEALTH CARE EDUCATION/TRAINING PROGRAM

## 2023-12-02 PROCEDURE — 36415 COLL VENOUS BLD VENIPUNCTURE: CPT

## 2023-12-02 PROCEDURE — 87186 SC STD MICRODIL/AGAR DIL: CPT

## 2023-12-02 RX ORDER — FENTANYL CITRATE 50 UG/ML
100 INJECTION, SOLUTION INTRAMUSCULAR; INTRAVENOUS ONCE
Status: COMPLETED | OUTPATIENT
Start: 2023-12-02 | End: 2023-12-02

## 2023-12-02 RX ORDER — INSULIN LISPRO 100 [IU]/ML
0-4 INJECTION, SOLUTION INTRAVENOUS; SUBCUTANEOUS EVERY 6 HOURS
Status: DISCONTINUED | OUTPATIENT
Start: 2023-12-02 | End: 2023-12-07

## 2023-12-02 RX ORDER — INSULIN LISPRO 100 [IU]/ML
0-4 INJECTION, SOLUTION INTRAVENOUS; SUBCUTANEOUS NIGHTLY
Status: DISCONTINUED | OUTPATIENT
Start: 2023-12-02 | End: 2023-12-07

## 2023-12-02 RX ORDER — PREDNISONE 20 MG/1
40 TABLET ORAL DAILY
Status: DISCONTINUED | OUTPATIENT
Start: 2023-12-02 | End: 2023-12-02

## 2023-12-02 RX ORDER — DEXTROSE MONOHYDRATE 100 MG/ML
INJECTION, SOLUTION INTRAVENOUS CONTINUOUS PRN
Status: DISCONTINUED | OUTPATIENT
Start: 2023-12-02 | End: 2023-12-11 | Stop reason: HOSPADM

## 2023-12-02 RX ORDER — ASPIRIN 325 MG
325 TABLET ORAL ONCE
Status: COMPLETED | OUTPATIENT
Start: 2023-12-02 | End: 2023-12-02

## 2023-12-02 RX ORDER — ACETAMINOPHEN 650 MG/1
650 SUPPOSITORY RECTAL EVERY 6 HOURS PRN
Status: DISCONTINUED | OUTPATIENT
Start: 2023-12-02 | End: 2023-12-11 | Stop reason: HOSPADM

## 2023-12-02 RX ORDER — ENOXAPARIN SODIUM 100 MG/ML
40 INJECTION SUBCUTANEOUS DAILY
Status: DISCONTINUED | OUTPATIENT
Start: 2023-12-02 | End: 2023-12-11 | Stop reason: HOSPADM

## 2023-12-02 RX ORDER — SODIUM CHLORIDE 9 MG/ML
INJECTION, SOLUTION INTRAVENOUS PRN
Status: DISCONTINUED | OUTPATIENT
Start: 2023-12-02 | End: 2023-12-11 | Stop reason: HOSPADM

## 2023-12-02 RX ORDER — IPRATROPIUM BROMIDE AND ALBUTEROL SULFATE 2.5; .5 MG/3ML; MG/3ML
1 SOLUTION RESPIRATORY (INHALATION)
Status: DISCONTINUED | OUTPATIENT
Start: 2023-12-02 | End: 2023-12-02

## 2023-12-02 RX ORDER — SODIUM CHLORIDE, SODIUM LACTATE, POTASSIUM CHLORIDE, AND CALCIUM CHLORIDE .6; .31; .03; .02 G/100ML; G/100ML; G/100ML; G/100ML
1000 INJECTION, SOLUTION INTRAVENOUS ONCE
Status: COMPLETED | OUTPATIENT
Start: 2023-12-02 | End: 2023-12-02

## 2023-12-02 RX ORDER — ACETAMINOPHEN 325 MG/1
650 TABLET ORAL EVERY 6 HOURS PRN
Status: DISCONTINUED | OUTPATIENT
Start: 2023-12-02 | End: 2023-12-11 | Stop reason: HOSPADM

## 2023-12-02 RX ORDER — ONDANSETRON 4 MG/1
4 TABLET, ORALLY DISINTEGRATING ORAL EVERY 8 HOURS PRN
Status: DISCONTINUED | OUTPATIENT
Start: 2023-12-02 | End: 2023-12-11 | Stop reason: HOSPADM

## 2023-12-02 RX ORDER — MAGNESIUM SULFATE IN WATER 40 MG/ML
2000 INJECTION, SOLUTION INTRAVENOUS ONCE
Status: COMPLETED | OUTPATIENT
Start: 2023-12-02 | End: 2023-12-02

## 2023-12-02 RX ORDER — SODIUM CHLORIDE 0.9 % (FLUSH) 0.9 %
5-40 SYRINGE (ML) INJECTION PRN
Status: DISCONTINUED | OUTPATIENT
Start: 2023-12-02 | End: 2023-12-11 | Stop reason: HOSPADM

## 2023-12-02 RX ORDER — GLUCAGON 1 MG/ML
1 KIT INJECTION PRN
Status: DISCONTINUED | OUTPATIENT
Start: 2023-12-02 | End: 2023-12-11 | Stop reason: HOSPADM

## 2023-12-02 RX ORDER — TERBUTALINE SULFATE 1 MG/ML
0.25 INJECTION, SOLUTION SUBCUTANEOUS ONCE
Status: COMPLETED | OUTPATIENT
Start: 2023-12-02 | End: 2023-12-02

## 2023-12-02 RX ORDER — MORPHINE SULFATE 4 MG/ML
4 INJECTION INTRAVENOUS ONCE
Status: DISCONTINUED | OUTPATIENT
Start: 2023-12-02 | End: 2023-12-02

## 2023-12-02 RX ORDER — ALBUTEROL SULFATE 2.5 MG/3ML
2.5 SOLUTION RESPIRATORY (INHALATION)
Status: DISCONTINUED | OUTPATIENT
Start: 2023-12-02 | End: 2023-12-02

## 2023-12-02 RX ORDER — BUDESONIDE 0.25 MG/2ML
0.25 INHALANT ORAL
Status: DISCONTINUED | OUTPATIENT
Start: 2023-12-02 | End: 2023-12-11 | Stop reason: HOSPADM

## 2023-12-02 RX ORDER — SODIUM CHLORIDE 0.9 % (FLUSH) 0.9 %
5-40 SYRINGE (ML) INJECTION EVERY 12 HOURS SCHEDULED
Status: DISCONTINUED | OUTPATIENT
Start: 2023-12-02 | End: 2023-12-11 | Stop reason: HOSPADM

## 2023-12-02 RX ORDER — SODIUM CHLORIDE, SODIUM LACTATE, POTASSIUM CHLORIDE, AND CALCIUM CHLORIDE .6; .31; .03; .02 G/100ML; G/100ML; G/100ML; G/100ML
500 INJECTION, SOLUTION INTRAVENOUS ONCE
Status: COMPLETED | OUTPATIENT
Start: 2023-12-02 | End: 2023-12-02

## 2023-12-02 RX ORDER — ALBUTEROL SULFATE 2.5 MG/3ML
15 SOLUTION RESPIRATORY (INHALATION)
Status: DISCONTINUED | OUTPATIENT
Start: 2023-12-02 | End: 2023-12-02

## 2023-12-02 RX ORDER — PREDNISONE 20 MG/1
40 TABLET ORAL DAILY
Status: COMPLETED | OUTPATIENT
Start: 2023-12-05 | End: 2023-12-07

## 2023-12-02 RX ORDER — ONDANSETRON 2 MG/ML
4 INJECTION INTRAMUSCULAR; INTRAVENOUS EVERY 6 HOURS PRN
Status: DISCONTINUED | OUTPATIENT
Start: 2023-12-02 | End: 2023-12-11 | Stop reason: HOSPADM

## 2023-12-02 RX ORDER — IPRATROPIUM BROMIDE AND ALBUTEROL SULFATE 2.5; .5 MG/3ML; MG/3ML
SOLUTION RESPIRATORY (INHALATION)
Status: DISCONTINUED
Start: 2023-12-02 | End: 2023-12-02 | Stop reason: WASHOUT

## 2023-12-02 RX ORDER — PROPOFOL 10 MG/ML
5-50 INJECTION, EMULSION INTRAVENOUS CONTINUOUS
Status: DISCONTINUED | OUTPATIENT
Start: 2023-12-02 | End: 2023-12-03

## 2023-12-02 RX ORDER — IPRATROPIUM BROMIDE AND ALBUTEROL SULFATE 2.5; .5 MG/3ML; MG/3ML
1 SOLUTION RESPIRATORY (INHALATION)
Status: DISCONTINUED | OUTPATIENT
Start: 2023-12-02 | End: 2023-12-11 | Stop reason: HOSPADM

## 2023-12-02 RX ORDER — POLYETHYLENE GLYCOL 3350 17 G/17G
17 POWDER, FOR SOLUTION ORAL DAILY PRN
Status: DISCONTINUED | OUTPATIENT
Start: 2023-12-02 | End: 2023-12-04

## 2023-12-02 RX ORDER — ETOMIDATE 2 MG/ML
0.3 INJECTION INTRAVENOUS ONCE
Status: COMPLETED | OUTPATIENT
Start: 2023-12-02 | End: 2023-12-02

## 2023-12-02 RX ADMIN — SODIUM CHLORIDE, POTASSIUM CHLORIDE, SODIUM LACTATE AND CALCIUM CHLORIDE 500 ML: 600; 310; 30; 20 INJECTION, SOLUTION INTRAVENOUS at 15:06

## 2023-12-02 RX ADMIN — FENTANYL CITRATE 100 MCG: 50 INJECTION INTRAMUSCULAR; INTRAVENOUS at 14:22

## 2023-12-02 RX ADMIN — PROPOFOL 20 MCG/KG/MIN: 10 INJECTION, EMULSION INTRAVENOUS at 14:22

## 2023-12-02 RX ADMIN — ENOXAPARIN SODIUM 40 MG: 100 INJECTION SUBCUTANEOUS at 18:16

## 2023-12-02 RX ADMIN — PROPOFOL 40 MCG/KG/MIN: 10 INJECTION, EMULSION INTRAVENOUS at 19:24

## 2023-12-02 RX ADMIN — BUDESONIDE 250 MCG: 0.25 INHALANT RESPIRATORY (INHALATION) at 20:31

## 2023-12-02 RX ADMIN — IPRATROPIUM BROMIDE AND ALBUTEROL SULFATE 1 DOSE: 2.5; .5 SOLUTION RESPIRATORY (INHALATION) at 20:31

## 2023-12-02 RX ADMIN — AZITHROMYCIN MONOHYDRATE 500 MG: 500 INJECTION, POWDER, LYOPHILIZED, FOR SOLUTION INTRAVENOUS at 12:14

## 2023-12-02 RX ADMIN — IPRATROPIUM BROMIDE AND ALBUTEROL SULFATE 1 DOSE: 2.5; .5 SOLUTION RESPIRATORY (INHALATION) at 17:15

## 2023-12-02 RX ADMIN — SODIUM CHLORIDE, SODIUM LACTATE, POTASSIUM CHLORIDE, AND CALCIUM CHLORIDE 1000 ML: .6; .31; .03; .02 INJECTION, SOLUTION INTRAVENOUS at 11:13

## 2023-12-02 RX ADMIN — TERBUTALINE SULFATE 0.25 MG: 1 INJECTION, SOLUTION SUBCUTANEOUS at 11:25

## 2023-12-02 RX ADMIN — CEFTRIAXONE 1000 MG: 1 INJECTION, POWDER, FOR SOLUTION INTRAMUSCULAR; INTRAVENOUS at 18:16

## 2023-12-02 RX ADMIN — IPRATROPIUM BROMIDE AND ALBUTEROL SULFATE 1 DOSE: .5; 3 SOLUTION RESPIRATORY (INHALATION) at 10:40

## 2023-12-02 RX ADMIN — WATER 40 MG: 1 INJECTION INTRAMUSCULAR; INTRAVENOUS; SUBCUTANEOUS at 21:40

## 2023-12-02 RX ADMIN — PROPOFOL 30 MCG/KG/MIN: 10 INJECTION, EMULSION INTRAVENOUS at 14:29

## 2023-12-02 RX ADMIN — MAGNESIUM SULFATE IN WATER 2000 MG: 40 INJECTION, SOLUTION INTRAVENOUS at 10:59

## 2023-12-02 RX ADMIN — SODIUM CHLORIDE, PRESERVATIVE FREE 10 ML: 5 INJECTION INTRAVENOUS at 21:44

## 2023-12-02 RX ADMIN — METHYLPREDNISOLONE SODIUM SUCCINATE 125 MG: 125 INJECTION INTRAMUSCULAR; INTRAVENOUS at 11:00

## 2023-12-02 RX ADMIN — ASPIRIN 325 MG: 325 TABLET ORAL at 13:00

## 2023-12-02 RX ADMIN — ALBUTEROL SULFATE 15 MG: 2.5 SOLUTION RESPIRATORY (INHALATION) at 13:15

## 2023-12-02 RX ADMIN — ETOMIDATE 20 MG: 20 INJECTION, SOLUTION INTRAVENOUS at 14:19

## 2023-12-02 ASSESSMENT — ENCOUNTER SYMPTOMS
GASTROINTESTINAL NEGATIVE: 1
COUGH: 1
EYES NEGATIVE: 1
WHEEZING: 1
SHORTNESS OF BREATH: 1
BACK PAIN: 0
NAUSEA: 0
CHEST TIGHTNESS: 1
TROUBLE SWALLOWING: 0
ABDOMINAL PAIN: 0
DIARRHEA: 0
VOMITING: 0
SORE THROAT: 0
STRIDOR: 0

## 2023-12-02 ASSESSMENT — PAIN SCALES - GENERAL
PAINLEVEL_OUTOF10: 0

## 2023-12-02 ASSESSMENT — PULMONARY FUNCTION TESTS
PIF_VALUE: 30
PIF_VALUE: 31

## 2023-12-02 NOTE — PROGRESS NOTES
Pt intubated due to respiratory failure. Pt given sedation pre-procedure. Pt has a class 2 Mallampati Score. Size 3 Glidescope used to visualize vocal chords. Size 8mm ET tube placed through the vocal chords to 22 cm at the teeth. ET tube cuff inflated to MOV. Positive color change on the etco2 detector noted. Bilateral breath sounds heard. X-ray called to confirm placement. Pt placed on mechanical ventilator with ordered settings. No visible trauma noted. Pt resting comfortably. I supervised this intubation. Vent settings will be APRV 400 mL, rate of 18, PEEP 10. Post intubation chest xray showed appropriate placement of the ETT above the noah.     Pipo Echols MD

## 2023-12-02 NOTE — ED NOTES
Report called to [facility] R[facility] for room 96191 [phone]. Icu Rn to come and take the pt to room 5229.      Mauro Zambrano RN  12/02/23 6531

## 2023-12-02 NOTE — H&P
ICU H&P      Hospital Day: 1  ICU Day: 1                                                         Code:Full Code  Admit Date: 12/2/2023  PCP: No primary care provider on file. CC: SOB    HISTORY OF PRESENT ILLNESS:   62 yo female pmhx of COPD presenting with SOB and wheezing. Patient hypoxic and tachycardic on presentation with auditory wheezing. Was recently in ED with similar symptoms, d/c'ed on z-pack and prednisone. Patient deteriorated over ED course, going from non-rebreather to [address] [place] [address] [address] 14/8 to 18/8, continuing to have significant SOB following interventions. Notable labs: K 5.4, BNP 1040, Troponin 165. EKG w/o ST elevations, ALT and AST elevated at 156 and 143 respectively, WBC elevated at 12. Admitted to ICU due to high intubation risk, on arrival due to patient WOB and ABGs decision made to intubate. Will continue to monitor. PAST HISTORY:     Past Medical History:   Diagnosis Date    Asthma     COPD (chronic obstructive pulmonary disease) (HCC)     Diabetes mellitus (HCC)     GERD (gastroesophageal reflux disease)        Past Surgical History:   Procedure Laterality Date    COLONOSCOPY      LAPAROSCOPY      OTHER SURGICAL HISTORY  1/9/2015    PLANTAR FASCIOTOMY RIGHT FOOT; STEROID INJECTION LEFT HEEL    OTHER SURGICAL HISTORY      patient states surgery for  stomach ulcer    PLANTAR FASCIA SURGERY Left 3/30/16    ENDOSCOPIC PLANTAR FASCIOTOMY LEFT FOOT    WISDOM TOOTH EXTRACTION         Family History:  No family history on file. MEDICATIONS:     No current facility-administered medications on file prior to encounter.      Current Outpatient Medications on File Prior to Encounter   Medication Sig Dispense Refill    albuterol sulfate HFA (PROVENTIL;VENTOLIN;PROAIR) 108 (90 Base) MCG/ACT inhaler Inhale 2 puffs into the lungs every 6 hours as needed for Wheezing 18 g 3    predniSONE (DELTASONE) 10 MG tablet Take 4 tablets by mouth once daily for 5 days 20 tablet 0

## 2023-12-02 NOTE — ED PROVIDER NOTES
ED Attending Attestation Note     Date of evaluation: 12/2/2023    I have discussed the case with the ALYSSIA. I have personally performed a history, physical exam, and my own medical decision making. I have reviewed the note and agree with the findings and plan. I have reviewed the ECG and concur with the ALYSSIA's interpretation. My assessment reveals an acutely distressed 77-year-old female with past medical history of COPD presenting with shortness of breath. She notably was here few days ago for COPD exacerbation and returns here acutely short of breath and wheezing. She is hypoxic despite being on a nonrebreather and has auditory wheezing. She was transition to BiPAP with improvement in her respiratory status as well as treated with DuoNebs, magnesium, Solu-Medrol, terbutaline, and azithromycin. Given her repeat visit for a severe COPD exacerbation patient will be admitted to the ICU as I worry that she could deteriorate and require intubation. Repeat VBG is improving, though minorly. Patient transported to the ICU without complication. Upon my evaluation, this patient had a high probability of imminent or life-threatening deterioration due to COPD requiring BiPAP and concern for possible airway deterioration, which required my direct attention, intervention, and personal management. I have personally provided 35 minutes of critical care time exclusive of time spent on separately billable procedures. Time includes review of laboratory data, radiology results, discussion with consultants, and monitoring for potential decompensation. Interventions were performed as documented above.      Jeronimo Sharma MD  12/02/23 5385

## 2023-12-02 NOTE — PROGRESS NOTES
Pt was transported on bipap from er to ICU. Pt tolerated transport well.    Will continue plan of care

## 2023-12-02 NOTE — PROGRESS NOTES
4 Eyes Skin Assessment     NAME:  Parveen Cortes  YOB: 1960  MEDICAL RECORD NUMBER:  [id number]    The patient is being assessed for  Admission    I agree that at least one RN has performed a thorough Head to Toe Skin Assessment on the patient. ALL assessment sites listed below have been assessed. Areas assessed by both nurses:    Head, Face, Ears, Shoulders, Back, Chest, Arms, Elbows, Hands, Sacrum. Buttock, Coccyx, Ischium, Legs. Feet and Heels, and Under Medical Devices         Does the Patient have a Wound?  No noted wound(s)       Severino Prevention initiated by RN: Yes  Wound Care Orders initiated by RN: No    Pressure Injury (Stage 3,4, Unstageable, DTI, NWPT, and Complex wounds) if present, place Wound referral order by RN under : No, n/a    New Ostomies, if present place, Ostomy referral order under : No, n/a    Nurse 1 eSignature: Electronically signed by Inga Woods RN on 12/2/23 at 4:25 PM EST    **SHARE this note so that the co-signing nurse can place an eSignature**    Nurse 2 eSignature: Electronically signed by Elliott Sarmiento RN on 12/2/23 at 7:17 PM EST

## 2023-12-02 NOTE — ED PROVIDER NOTES
Protocol      Answer:   Yes - ED protocol     albuterol (PROVENTIL) (2.5 MG/3ML) 0.083% nebulizer solution 2.5 mg      Order Specific Question:   Initiate RT Bronchodilator Protocol      Answer:   Yes - ED protocol    AND Linked Order Group     albuterol (PROVENTIL) (2.5 MG/3ML) 0.083% nebulizer solution 15 mg      Order Specific Question:   Initiate RT Bronchodilator Protocol      Answer:   Yes - ED protocol     ipratropium (ATROVENT) 0.02 % nebulizer solution 0.5 mg      Order Specific Question:   Initiate RT Bronchodilator Protocol      Answer:   Yes - ED protocol    DISCONTD: morphine injection 4 mg    lactated ringers bolus bolus 1,000 mL    terbutaline (BRETHINE) injection 0.25 mg    azithromycin (ZITHROMAX) 500 mg in sodium chloride 0.9 % 250 mL IVPB     Order Specific Question:   Antimicrobial Indications     Answer:   COPD Exacerbation    aspirin tablet 325 mg       CONSULTS:  IP CONSULT TO CRITICAL CARE    Review of Systems     Review of Systems   Constitutional: Negative. Negative for chills, fatigue and fever. HENT: Negative. Negative for congestion, postnasal drip, sore throat and trouble swallowing. Eyes: Negative. Negative for visual disturbance. Respiratory:  Positive for cough, chest tightness, shortness of breath and wheezing. Negative for stridor. Cardiovascular: Negative. Negative for chest pain, palpitations and leg swelling. Gastrointestinal: Negative. Negative for abdominal pain, diarrhea, nausea and vomiting. Genitourinary: Negative. Negative for dysuria, flank pain, hematuria and pelvic pain. Musculoskeletal: Negative. Negative for back pain, neck pain and neck stiffness. Skin: Negative. Negative for rash. Neurological: Negative. Negative for dizziness, syncope, weakness, light-headedness, numbness and headaches. Psychiatric/Behavioral: Negative. Negative for confusion. All other systems reviewed and are negative.       Past Medical, Surgical, Family, and VITALS: BP: (!) 234/127,  , Pulse: (!) 144, Respirations: 28, SpO2: 96 %  Physical Exam  Vitals and nursing note reviewed. Constitutional:       General: She is in acute distress. Appearance: She is ill-appearing. She is not diaphoretic. HENT:      Head: Normocephalic and atraumatic. Right Ear: External ear normal.      Left Ear: External ear normal.      Nose: Nose normal.      Mouth/Throat:      Pharynx: Oropharynx is clear. Eyes:      Conjunctiva/sclera: Conjunctivae normal.      Pupils: Pupils are equal, round, and reactive to light. Cardiovascular:      Rate and Rhythm: Regular rhythm. Tachycardia present. Pulses: Normal pulses. Heart sounds: Normal heart sounds. Pulmonary:      Breath sounds: Wheezing present. Comments: Audible wheezing with inspiratory and expiratory wheezing noted  Abdominal:      General: There is no distension. Palpations: Abdomen is soft. Tenderness: There is no abdominal tenderness. There is no right CVA tenderness, left CVA tenderness, guarding or rebound. Musculoskeletal:         General: Normal range of motion. Cervical back: Normal range of motion and neck supple. No rigidity or tenderness. Right lower leg: No edema. Left lower leg: No edema. Skin:     General: Skin is warm and dry. Neurological:      General: No focal deficit present. Mental Status: She is alert and oriented to person, place, and time. Cranial Nerves: No cranial nerve deficit. Sensory: No sensory deficit. Motor: No weakness.             Elma Libman [place], [place]  12/02/23 1310

## 2023-12-02 NOTE — PLAN OF CARE
Problem: Respiratory - Adult  Goal: Achieves optimal ventilation and oxygenation  Outcome: Progressing  Flowsheets (Taken 12/2/2023 1430)  Achieves optimal ventilation and oxygenation:   Assess for changes in respiratory status   Assess for changes in mentation and behavior   Position to facilitate oxygenation and minimize respiratory effort   Oxygen supplementation based on oxygen saturation or arterial blood gases   Initiate smoking cessation protocol as indicated   Encourage broncho-pulmonary hygiene including cough, deep breathe, incentive spirometry   Assess the need for suctioning and aspirate as needed   Assess and instruct to report shortness of breath or any respiratory difficulty   Respiratory therapy support as indicated     Problem: Discharge Planning  Goal: Discharge to home or other facility with appropriate resources  Outcome: Progressing  Flowsheets (Taken 12/2/2023 1430)  Discharge to home or other facility with appropriate resources:   Identify barriers to discharge with patient and caregiver   Arrange for needed discharge resources and transportation as appropriate   Identify discharge learning needs (meds, wound care, etc)   Refer to discharge planning if patient needs post-hospital services based on physician order or complex needs related to functional status, cognitive ability or social support system     Problem: Pain  Goal: Verbalizes/displays adequate comfort level or baseline comfort level  Outcome: Progressing  Flowsheets (Taken 12/2/2023 1430)  Verbalizes/displays adequate comfort level or baseline comfort level:   Encourage patient to monitor pain and request assistance   Assess pain using appropriate pain scale   Administer analgesics based on type and severity of pain and evaluate response   Implement non-pharmacological measures as appropriate and evaluate response   Consider cultural and social influences on pain and pain management   Notify Licensed Independent Practitioner if

## 2023-12-02 NOTE — PROGRESS NOTES
Attempt to place OG unsuccessful x2. Report of small hiatal hernia per CT abdomen results. No OG at this time. ICU team aware, will hold off for now.      Elliott Sarmiento RN

## 2023-12-03 LAB
ALBUMIN SERPL-MCNC: 3.9 G/DL (ref 3.4–5)
ALP SERPL-CCNC: 62 U/L (ref 40–129)
ALT SERPL-CCNC: 449 U/L (ref 10–40)
ANION GAP SERPL CALCULATED.3IONS-SCNC: 7 MMOL/L (ref 3–16)
APAP SERPL-MCNC: <5 UG/ML (ref 10–30)
AST SERPL-CCNC: 383 U/L (ref 15–37)
BACTERIA URNS QL MICRO: ABNORMAL /HPF
BASE EXCESS BLDA CALC-SCNC: 2.1 MMOL/L (ref -3–3)
BASE EXCESS BLDA CALC-SCNC: 3.6 MMOL/L (ref -3–3)
BASE EXCESS BLDA CALC-SCNC: 4 MMOL/L (ref -3–3)
BASOPHILS # BLD: 0 K/UL (ref 0–0.2)
BASOPHILS NFR BLD: 0.1 %
BILIRUB DIRECT SERPL-MCNC: <0.2 MG/DL (ref 0–0.3)
BILIRUB INDIRECT SERPL-MCNC: ABNORMAL MG/DL (ref 0–1)
BILIRUB SERPL-MCNC: <0.2 MG/DL (ref 0–1)
BILIRUB UR QL STRIP.AUTO: NEGATIVE
BUN SERPL-MCNC: 23 MG/DL (ref 7–20)
CA-I BLD-SCNC: 1.2 MMOL/L (ref 1.12–1.32)
CALCIUM SERPL-MCNC: 8.8 MG/DL (ref 8.3–10.6)
CHLORIDE SERPL-SCNC: 101 MMOL/L (ref 99–110)
CLARITY UR: CLEAR
CO2 BLDA-SCNC: 32 MMOL/L
CO2 BLDA-SCNC: 33 MMOL/L
CO2 BLDA-SCNC: 34 MMOL/L
CO2 SERPL-SCNC: 31 MMOL/L (ref 21–32)
COHGB MFR BLDA: 1 % (ref 0–1.5)
COHGB MFR BLDA: 1 % (ref 0–1.5)
COLOR UR: YELLOW
CREAT SERPL-MCNC: 1 MG/DL (ref 0.6–1.2)
DEPRECATED RDW RBC AUTO: 18.9 % (ref 12.4–15.4)
EOSINOPHIL # BLD: 0 K/UL (ref 0–0.6)
EOSINOPHIL NFR BLD: 0 %
EPI CELLS #/AREA URNS HPF: ABNORMAL /HPF (ref 0–5)
FERRITIN SERPL IA-MCNC: 90.5 NG/ML (ref 15–150)
GFR SERPLBLD CREATININE-BSD FMLA CKD-EPI: >60 ML/MIN/{1.73_M2}
GLUCOSE BLD-MCNC: 119 MG/DL (ref 70–99)
GLUCOSE BLD-MCNC: 135 MG/DL (ref 70–99)
GLUCOSE BLD-MCNC: 147 MG/DL (ref 70–99)
GLUCOSE BLD-MCNC: 159 MG/DL (ref 70–99)
GLUCOSE BLD-MCNC: 161 MG/DL (ref 70–99)
GLUCOSE SERPL-MCNC: 126 MG/DL (ref 70–99)
GLUCOSE UR STRIP.AUTO-MCNC: NEGATIVE MG/DL
HCO3 BLDA-SCNC: 30 MMOL/L (ref 21–29)
HCO3 BLDA-SCNC: 31 MMOL/L (ref 21–29)
HCO3 BLDA-SCNC: 32 MMOL/L (ref 21–29)
HCT VFR BLD AUTO: 33.6 % (ref 36–48)
HGB BLD-MCNC: 10.7 G/DL (ref 12–16)
HGB BLDA-MCNC: 11.3 G/DL
HGB BLDA-MCNC: 11.3 G/DL
HGB UR QL STRIP.AUTO: NEGATIVE
HYALINE CASTS #/AREA URNS LPF: ABNORMAL /LPF (ref 0–2)
KETONES UR STRIP.AUTO-MCNC: NEGATIVE MG/DL
LACTATE BLD-SCNC: 1.75 MMOL/L (ref 0.4–2)
LEUKOCYTE ESTERASE UR QL STRIP.AUTO: NEGATIVE
LYMPHOCYTES # BLD: 1.6 K/UL (ref 1–5.1)
LYMPHOCYTES NFR BLD: 17.1 %
MAGNESIUM SERPL-MCNC: 2.3 MG/DL (ref 1.8–2.4)
MCH RBC QN AUTO: 23.9 PG (ref 26–34)
MCHC RBC AUTO-ENTMCNC: 31.9 G/DL (ref 31–36)
MCV RBC AUTO: 75 FL (ref 80–100)
METHGB MFR BLDA: 0.5 % (ref 0–1.4)
METHGB MFR BLDA: 0.5 % (ref 0–1.4)
MONOCYTES # BLD: 1.1 K/UL (ref 0–1.3)
MONOCYTES NFR BLD: 12 %
NEUTROPHILS # BLD: 6.7 K/UL (ref 1.7–7.7)
NEUTROPHILS NFR BLD: 70.8 %
NITRITE UR QL STRIP.AUTO: NEGATIVE
PCO2 BLDA: 56.3 MM HG (ref 35–45)
PCO2 BLDA: 69.9 MMHG (ref 35–45)
PCO2 BLDA: 71 MMHG (ref 35–45)
PERFORMED ON: ABNORMAL
PH BLDA: 7.25 [PH] (ref 7.35–7.45)
PH BLDA: 7.27 [PH] (ref 7.35–7.45)
PH BLDA: 7.33 [PH] (ref 7.35–7.45)
PH UR STRIP.AUTO: 6 [PH] (ref 5–8)
PHOSPHATE SERPL-MCNC: 2.8 MG/DL (ref 2.5–4.9)
PLATELET # BLD AUTO: 270 K/UL (ref 135–450)
PMV BLD AUTO: 7.8 FL (ref 5–10.5)
PO2 BLDA: 150.8 MM HG (ref 75–108)
PO2 BLDA: 86.4 MMHG (ref 75–108)
PO2 BLDA: 88.9 MMHG (ref 75–108)
POC SAMPLE TYPE: ABNORMAL
POTASSIUM BLD-SCNC: 4.7 MMOL/L (ref 3.5–5.1)
POTASSIUM SERPL-SCNC: 5.5 MMOL/L (ref 3.5–5.1)
PROT SERPL-MCNC: 6.3 G/DL (ref 6.4–8.2)
PROT UR STRIP.AUTO-MCNC: 100 MG/DL
RBC # BLD AUTO: 4.48 M/UL (ref 4–5.2)
RBC #/AREA URNS HPF: ABNORMAL /HPF (ref 0–4)
SAO2 % BLDA: 96 % (ref 93–100)
SAO2 % BLDA: 96 % (ref 93–100)
SAO2 % BLDA: 99 % (ref 93–100)
SODIUM BLD-SCNC: 138 MMOL/L (ref 136–145)
SODIUM SERPL-SCNC: 139 MMOL/L (ref 136–145)
SP GR UR STRIP.AUTO: >=1.03 (ref 1–1.03)
UA COMPLETE W REFLEX CULTURE PNL UR: ABNORMAL
UA DIPSTICK W REFLEX MICRO PNL UR: YES
URN SPEC COLLECT METH UR: ABNORMAL
UROBILINOGEN UR STRIP-ACNC: 0.2 E.U./DL
WBC # BLD AUTO: 9.5 K/UL (ref 4–11)
WBC #/AREA URNS HPF: ABNORMAL /HPF (ref 0–5)

## 2023-12-03 PROCEDURE — 6370000000 HC RX 637 (ALT 250 FOR IP): Performed by: INTERNAL MEDICINE

## 2023-12-03 PROCEDURE — 94761 N-INVAS EAR/PLS OXIMETRY MLT: CPT

## 2023-12-03 PROCEDURE — 84132 ASSAY OF SERUM POTASSIUM: CPT

## 2023-12-03 PROCEDURE — 83735 ASSAY OF MAGNESIUM: CPT

## 2023-12-03 PROCEDURE — 2580000003 HC RX 258

## 2023-12-03 PROCEDURE — 80076 HEPATIC FUNCTION PANEL: CPT

## 2023-12-03 PROCEDURE — 81001 URINALYSIS AUTO W/SCOPE: CPT

## 2023-12-03 PROCEDURE — 6360000002 HC RX W HCPCS

## 2023-12-03 PROCEDURE — 36415 COLL VENOUS BLD VENIPUNCTURE: CPT

## 2023-12-03 PROCEDURE — 80048 BASIC METABOLIC PNL TOTAL CA: CPT

## 2023-12-03 PROCEDURE — 6360000002 HC RX W HCPCS: Performed by: INTERNAL MEDICINE

## 2023-12-03 PROCEDURE — 82947 ASSAY GLUCOSE BLOOD QUANT: CPT

## 2023-12-03 PROCEDURE — 80143 DRUG ASSAY ACETAMINOPHEN: CPT

## 2023-12-03 PROCEDURE — 84466 ASSAY OF TRANSFERRIN: CPT

## 2023-12-03 PROCEDURE — 84295 ASSAY OF SERUM SODIUM: CPT

## 2023-12-03 PROCEDURE — 82803 BLOOD GASES ANY COMBINATION: CPT

## 2023-12-03 PROCEDURE — 82330 ASSAY OF CALCIUM: CPT

## 2023-12-03 PROCEDURE — 99291 CRITICAL CARE FIRST HOUR: CPT | Performed by: INTERNAL MEDICINE

## 2023-12-03 PROCEDURE — 2700000000 HC OXYGEN THERAPY PER DAY

## 2023-12-03 PROCEDURE — 6360000002 HC RX W HCPCS: Performed by: STUDENT IN AN ORGANIZED HEALTH CARE EDUCATION/TRAINING PROGRAM

## 2023-12-03 PROCEDURE — 83540 ASSAY OF IRON: CPT

## 2023-12-03 PROCEDURE — 2000000000 HC ICU R&B

## 2023-12-03 PROCEDURE — 85025 COMPLETE CBC W/AUTO DIFF WBC: CPT

## 2023-12-03 PROCEDURE — 94003 VENT MGMT INPAT SUBQ DAY: CPT

## 2023-12-03 PROCEDURE — 82728 ASSAY OF FERRITIN: CPT

## 2023-12-03 PROCEDURE — 94640 AIRWAY INHALATION TREATMENT: CPT

## 2023-12-03 PROCEDURE — 83605 ASSAY OF LACTIC ACID: CPT

## 2023-12-03 PROCEDURE — 84100 ASSAY OF PHOSPHORUS: CPT

## 2023-12-03 RX ORDER — PANTOPRAZOLE SODIUM 40 MG/1
40 TABLET, DELAYED RELEASE ORAL
Status: DISCONTINUED | OUTPATIENT
Start: 2023-12-03 | End: 2023-12-04 | Stop reason: SDUPTHER

## 2023-12-03 RX ORDER — PROPOFOL 10 MG/ML
5-50 INJECTION, EMULSION INTRAVENOUS CONTINUOUS
Status: DISCONTINUED | OUTPATIENT
Start: 2023-12-03 | End: 2023-12-05

## 2023-12-03 RX ADMIN — SODIUM CHLORIDE, PRESERVATIVE FREE 10 ML: 5 INJECTION INTRAVENOUS at 08:47

## 2023-12-03 RX ADMIN — SODIUM CHLORIDE, PRESERVATIVE FREE 5 ML: 5 INJECTION INTRAVENOUS at 21:00

## 2023-12-03 RX ADMIN — PANTOPRAZOLE SODIUM 40 MG: 40 TABLET, DELAYED RELEASE ORAL at 08:47

## 2023-12-03 RX ADMIN — IPRATROPIUM BROMIDE AND ALBUTEROL SULFATE 1 DOSE: 2.5; .5 SOLUTION RESPIRATORY (INHALATION) at 15:43

## 2023-12-03 RX ADMIN — ENOXAPARIN SODIUM 40 MG: 100 INJECTION SUBCUTANEOUS at 08:16

## 2023-12-03 RX ADMIN — IPRATROPIUM BROMIDE AND ALBUTEROL SULFATE 1 DOSE: 2.5; .5 SOLUTION RESPIRATORY (INHALATION) at 08:21

## 2023-12-03 RX ADMIN — PROPOFOL 50 MCG/KG/MIN: 10 INJECTION, EMULSION INTRAVENOUS at 03:59

## 2023-12-03 RX ADMIN — CEFTRIAXONE 1000 MG: 1 INJECTION, POWDER, FOR SOLUTION INTRAMUSCULAR; INTRAVENOUS at 14:01

## 2023-12-03 RX ADMIN — PROPOFOL 50 MCG/KG/MIN: 10 INJECTION, EMULSION INTRAVENOUS at 18:45

## 2023-12-03 RX ADMIN — PROPOFOL 50 MCG/KG/MIN: 10 INJECTION, EMULSION INTRAVENOUS at 00:45

## 2023-12-03 RX ADMIN — PROPOFOL 50 MCG/KG/MIN: 10 INJECTION, EMULSION INTRAVENOUS at 09:00

## 2023-12-03 RX ADMIN — IPRATROPIUM BROMIDE AND ALBUTEROL SULFATE 1 DOSE: 2.5; .5 SOLUTION RESPIRATORY (INHALATION) at 12:49

## 2023-12-03 RX ADMIN — WATER 40 MG: 1 INJECTION INTRAMUSCULAR; INTRAVENOUS; SUBCUTANEOUS at 21:48

## 2023-12-03 RX ADMIN — PROPOFOL 50 MCG/KG/MIN: 10 INJECTION, EMULSION INTRAVENOUS at 23:38

## 2023-12-03 RX ADMIN — WATER 40 MG: 1 INJECTION INTRAMUSCULAR; INTRAVENOUS; SUBCUTANEOUS at 08:16

## 2023-12-03 RX ADMIN — BUDESONIDE 250 MCG: 0.25 INHALANT RESPIRATORY (INHALATION) at 08:22

## 2023-12-03 RX ADMIN — PROPOFOL 50 MCG/KG/MIN: 10 INJECTION, EMULSION INTRAVENOUS at 14:45

## 2023-12-03 RX ADMIN — IPRATROPIUM BROMIDE AND ALBUTEROL SULFATE 1 DOSE: 2.5; .5 SOLUTION RESPIRATORY (INHALATION) at 20:12

## 2023-12-03 RX ADMIN — BUDESONIDE 250 MCG: 0.25 INHALANT RESPIRATORY (INHALATION) at 20:12

## 2023-12-03 ASSESSMENT — PAIN SCALES - GENERAL
PAINLEVEL_OUTOF10: 0

## 2023-12-03 ASSESSMENT — PULMONARY FUNCTION TESTS
PIF_VALUE: 14
PIF_VALUE: 33
PIF_VALUE: 34

## 2023-12-03 NOTE — PROGRESS NOTES
ICU Progress Note    Admit Date: 12/2/2023  Day: 2  Vent Day: 2  IV Access:Peripheral  IV Fluids:None  Vasopressors:None                Antibiotics: Rocephin  Diet: Diet NPO    CC: SOB    Interval history:   VSS stable. Pt sedated with propofol, however, responds to painful stimuli on mechanical ventilation setting FiO2 35%, , RR 22, PEEP 10. ABG improved overnight, pH 7.271, pCO2 69 will continue to trend and fine tune as needed. HPI:   \"60 yo female pmhx of COPD presenting with SOB and wheezing. Patient hypoxic and tachycardic on presentation with auditory wheezing. Was recently in ED with similar symptoms, d/c'ed on z-pack and prednisone. Patient deteriorated over ED course, going from non-rebreather to [address] [place] [address] [address] 14/8 to 18/8, continuing to have significant SOB following interventions. Notable labs: K 5.4, BNP 1040, Troponin 165. EKG w/o ST elevations, ALT and AST elevated at 156 and 143 respectively, WBC elevated at 12. Admitted to ICU due to high intubation risk, on arrival due to patient WOB and ABGs decision made to intubate. Will continue to monitor. \"    -Copied per Laurent Kelly MD       Medications:     Scheduled Meds:   pantoprazole  40 mg Oral QAM AC    sodium chloride flush  5-40 mL IntraVENous 2 times per day    enoxaparin  40 mg SubCUTAneous Daily    cefTRIAXone (ROCEPHIN) IV  1,000 mg IntraVENous Q24H    insulin lispro  0-4 Units SubCUTAneous Q6H    insulin lispro  0-4 Units SubCUTAneous Nightly    methylPREDNISolone  40 mg IntraVENous Q12H    [START ON 12/5/2023] predniSONE  40 mg Oral Daily    budesonide  0.25 mg Nebulization BID RT    ipratropium 0.5 mg-albuterol 2.5 mg  1 Dose Inhalation Q4H WA RT     Continuous Infusions:   sodium chloride      propofol 50 mcg/kg/min (12/03/23 0900)    dextrose       PRN Meds:sodium chloride flush, sodium chloride, ondansetron **OR** ondansetron, polyethylene glycol, acetaminophen **OR** acetaminophen, perflutren lipid microspheres, glucose,

## 2023-12-03 NOTE — PLAN OF CARE
Problem: Respiratory - Adult  Goal: Achieves optimal ventilation and oxygenation  12/3/2023 1141 by Shannon Bernstein RN  Outcome: Progressing  Flowsheets (Taken 12/3/2023 0800)  Achieves optimal ventilation and oxygenation:   Assess for changes in respiratory status   Assess for changes in mentation and behavior   Position to facilitate oxygenation and minimize respiratory effort   Oxygen supplementation based on oxygen saturation or arterial blood gases   Initiate smoking cessation protocol as indicated   Encourage broncho-pulmonary hygiene including cough, deep breathe, incentive spirometry   Assess the need for suctioning and aspirate as needed   Assess and instruct to report shortness of breath or any respiratory difficulty   Respiratory therapy support as indicated     Problem: Discharge Planning  Goal: Discharge to home or other facility with appropriate resources  12/3/2023 1141 by Shannon Bernstein RN  Outcome: Progressing  Flowsheets (Taken 12/3/2023 0800)  Discharge to home or other facility with appropriate resources:   Arrange for needed discharge resources and transportation as appropriate   Identify barriers to discharge with patient and caregiver   Identify discharge learning needs (meds, wound care, etc)   Refer to discharge planning if patient needs post-hospital services based on physician order or complex needs related to functional status, cognitive ability or social support system     Problem: Pain  Goal: Verbalizes/displays adequate comfort level or baseline comfort level  12/3/2023 1141 by Shannon Bernstein RN  Outcome: Progressing  Flowsheets (Taken 12/3/2023 0800)  Verbalizes/displays adequate comfort level or baseline comfort level:   Encourage patient to monitor pain and request assistance   Assess pain using appropriate pain scale   Administer analgesics based on type and severity of pain and evaluate response   Consider cultural and social influences on pain and pain management   Implement

## 2023-12-03 NOTE — PROGRESS NOTES
[place]2.0    [facility] [place]      Name:  Manuela Howard /Age/Sex: 1960  (61 y.o. female)   MRN & CSN:  [id number] & [id number] Encounter Date/Time: 12/3/2023 3:56 PM EST   Location:  [facility]/8716- PCP: No primary care provider on file. [place] [facility] Day: 2    Assessment and Recommendations     42-year-old female with history of COPD and previous hospitalizations presenting with shortness of breath and increased work of breathing and admitted with acute hypercapnic respiratory failure requiring mechanical intubation. Upon presentation patient was found tachycardic, tachypneic, with severe respiratory distress. Initial BiPAP trials were unsuccessful. Lab work-up revealing respiratory acidosis with a PCO2 of more than 90, hyperkalemia with potassium above 5, elevated liver enzymes, microcytic anemia. Patient admitted to the ICU where she was intubated and on maintain on mechanical ventilation. Her short course just demonstrate improvement in serial blood gases. Also been provided with antibiotics, Solu-Medrol. Evaluate troponin and elevated BNP for which an echo was ordered. Acute hypercapnic respiratory failure  Ventilator dependent respiratory failure  COPD  Transaminitis  Hyperkalemia  Respiratory acidosis  Hypertension  Elevated troponin/BNP    Plan:   Appreciate ICU team.  Ventilator management as per ICU recommendations. Once patient more clinically stable will inquire as to how she decompensated to the point of coming to the hospital.  Recommend continuing sliding scale. Lovenox for DVT prophylaxis.   Follow-up echocardiogram.      Diet Diet NPO  DIET TUBE FEED VOLUME BASED WITH DIET Diabetic; Orogastric; Cyclic   DVT Prophylaxis [x] Lovenox, []  Heparin, [] SCDs, [] Ambulation,  [] Eliquis, [] Xarelto  [] Coumadin   Code Status Full Code   Disposition From: Unsure  Expected Disposition: Unsure  Estimated Date of Discharge: TBD  Patient requires

## 2023-12-03 NOTE — PLAN OF CARE
Problem: Respiratory - Adult  Goal: Achieves optimal ventilation and oxygenation  12/2/2023 2217 by Robinson Solorzano RN  Outcome: Progressing     Problem: Discharge Planning  Goal: Discharge to home or other facility with appropriate resources  12/2/2023 2217 by Robinson Solorzano RN  Outcome: Progressing     Problem: Pain  Goal: Verbalizes/displays adequate comfort level or baseline comfort level  12/2/2023 2217 by Robinson Solorzano RN  Outcome: Progressing     Problem: Safety - Adult  Goal: Free from fall injury  12/2/2023 2217 by Robinson Solorzano RN  Outcome: Progressing     Problem: Safety - Medical Restraint  Goal: Remains free of injury from restraints (Restraint for Interference with Medical Device)  Description: INTERVENTIONS:  1. Determine that other, less restrictive measures have been tried or would not be effective before applying the restraint  2. Evaluate the patient's condition at the time of restraint application  3. Inform patient/family regarding the reason for restraint  4.  Q2H: Monitor safety, psychosocial status, comfort, nutrition and hydration  12/2/2023 2217 by Robinson Solorzano RN  Outcome: Progressing  Flowsheets  Taken 12/2/2023 2200 by Robinson Solorzano RN  Remains free of injury from restraints (restraint for interference with medical device):   Determine that other, less restrictive measures have been tried or would not be effective before applying the restraint   Evaluate the patient's condition at the time of restraint application   Inform patient/family regarding the reason for restraint   Every 2 hours: Monitor safety, psychosocial status, comfort, nutrition and hydration  Taken 12/2/2023 2000 by Robinson Solorzano RN  Remains free of injury from restraints (restraint for interference with medical device):   Determine that other, less restrictive measures have been tried or would not be effective before applying the restraint   Evaluate the patient's condition at the time of restraint application   Inform patient/family

## 2023-12-04 LAB
ANION GAP SERPL CALCULATED.3IONS-SCNC: 7 MMOL/L (ref 3–16)
BASE EXCESS BLDA CALC-SCNC: 9.6 MMOL/L (ref -3–3)
BASOPHILS # BLD: 0 K/UL (ref 0–0.2)
BASOPHILS NFR BLD: 0.1 %
BUN SERPL-MCNC: 21 MG/DL (ref 7–20)
CALCIUM SERPL-MCNC: 9.3 MG/DL (ref 8.3–10.6)
CHLORIDE SERPL-SCNC: 100 MMOL/L (ref 99–110)
CO2 BLDA-SCNC: 39 MMOL/L
CO2 SERPL-SCNC: 34 MMOL/L (ref 21–32)
COHGB MFR BLDA: 1.2 % (ref 0–1.5)
CREAT SERPL-MCNC: 0.8 MG/DL (ref 0.6–1.2)
DEPRECATED RDW RBC AUTO: 19.2 % (ref 12.4–15.4)
EOSINOPHIL # BLD: 0 K/UL (ref 0–0.6)
EOSINOPHIL NFR BLD: 0 %
EST. AVERAGE GLUCOSE BLD GHB EST-MCNC: 157.1 MG/DL
FOLATE SERPL-MCNC: 12.58 NG/ML (ref 4.78–24.2)
GFR SERPLBLD CREATININE-BSD FMLA CKD-EPI: >60 ML/MIN/{1.73_M2}
GLUCOSE BLD-MCNC: 160 MG/DL (ref 70–99)
GLUCOSE BLD-MCNC: 188 MG/DL (ref 70–99)
GLUCOSE BLD-MCNC: 229 MG/DL (ref 70–99)
GLUCOSE BLD-MCNC: 240 MG/DL (ref 70–99)
GLUCOSE SERPL-MCNC: 218 MG/DL (ref 70–99)
HAV IGM SERPL QL IA: NORMAL
HBA1C MFR BLD: 7.1 %
HBV CORE IGM SERPL QL IA: NORMAL
HBV SURFACE AG SERPL QL IA: NORMAL
HCO3 BLDA-SCNC: 37 MMOL/L (ref 21–29)
HCT VFR BLD AUTO: 33.9 % (ref 36–48)
HCV AB SERPL QL IA: NORMAL
HGB BLD-MCNC: 11 G/DL (ref 12–16)
HGB BLDA-MCNC: 11.4 G/DL
IRON SATN MFR SERPL: 10 % (ref 15–50)
IRON SERPL-MCNC: 38 UG/DL (ref 37–145)
LYMPHOCYTES # BLD: 1.1 K/UL (ref 1–5.1)
LYMPHOCYTES NFR BLD: 12.2 %
MAGNESIUM SERPL-MCNC: 2.3 MG/DL (ref 1.8–2.4)
MCH RBC QN AUTO: 24.2 PG (ref 26–34)
MCHC RBC AUTO-ENTMCNC: 32.5 G/DL (ref 31–36)
MCV RBC AUTO: 74.7 FL (ref 80–100)
METHGB MFR BLDA: 0.4 % (ref 0–1.4)
MONOCYTES # BLD: 0.8 K/UL (ref 0–1.3)
MONOCYTES NFR BLD: 9 %
NEUTROPHILS # BLD: 7.1 K/UL (ref 1.7–7.7)
NEUTROPHILS NFR BLD: 78.7 %
PCO2 BLDA: 63.3 MMHG (ref 35–45)
PERFORMED ON: ABNORMAL
PH BLDA: 7.37 [PH] (ref 7.35–7.45)
PLATELET # BLD AUTO: 273 K/UL (ref 135–450)
PMV BLD AUTO: 8.2 FL (ref 5–10.5)
PO2 BLDA: 80.8 MMHG (ref 75–108)
POTASSIUM SERPL-SCNC: 5.5 MMOL/L (ref 3.5–5.1)
RBC # BLD AUTO: 4.54 M/UL (ref 4–5.2)
SAO2 % BLDA: 96 % (ref 93–100)
SODIUM SERPL-SCNC: 141 MMOL/L (ref 136–145)
TIBC SERPL-MCNC: 362 UG/DL (ref 260–445)
TRANSFERRIN SERPL-MCNC: 301 MG/DL (ref 200–360)
VIT B12 SERPL-MCNC: 1071 PG/ML (ref 211–911)
WBC # BLD AUTO: 9.1 K/UL (ref 4–11)

## 2023-12-04 PROCEDURE — 82746 ASSAY OF FOLIC ACID SERUM: CPT

## 2023-12-04 PROCEDURE — 6360000004 HC RX CONTRAST MEDICATION

## 2023-12-04 PROCEDURE — 6370000000 HC RX 637 (ALT 250 FOR IP): Performed by: INTERNAL MEDICINE

## 2023-12-04 PROCEDURE — 83735 ASSAY OF MAGNESIUM: CPT

## 2023-12-04 PROCEDURE — 6370000000 HC RX 637 (ALT 250 FOR IP): Performed by: STUDENT IN AN ORGANIZED HEALTH CARE EDUCATION/TRAINING PROGRAM

## 2023-12-04 PROCEDURE — 6370000000 HC RX 637 (ALT 250 FOR IP)

## 2023-12-04 PROCEDURE — 2000000000 HC ICU R&B

## 2023-12-04 PROCEDURE — 82803 BLOOD GASES ANY COMBINATION: CPT

## 2023-12-04 PROCEDURE — 6360000002 HC RX W HCPCS: Performed by: STUDENT IN AN ORGANIZED HEALTH CARE EDUCATION/TRAINING PROGRAM

## 2023-12-04 PROCEDURE — 2700000000 HC OXYGEN THERAPY PER DAY

## 2023-12-04 PROCEDURE — 6360000002 HC RX W HCPCS

## 2023-12-04 PROCEDURE — 94761 N-INVAS EAR/PLS OXIMETRY MLT: CPT

## 2023-12-04 PROCEDURE — 80074 ACUTE HEPATITIS PANEL: CPT

## 2023-12-04 PROCEDURE — 36415 COLL VENOUS BLD VENIPUNCTURE: CPT

## 2023-12-04 PROCEDURE — 80048 BASIC METABOLIC PNL TOTAL CA: CPT

## 2023-12-04 PROCEDURE — 99291 CRITICAL CARE FIRST HOUR: CPT | Performed by: INTERNAL MEDICINE

## 2023-12-04 PROCEDURE — 82607 VITAMIN B-12: CPT

## 2023-12-04 PROCEDURE — 2580000003 HC RX 258

## 2023-12-04 PROCEDURE — 94640 AIRWAY INHALATION TREATMENT: CPT

## 2023-12-04 PROCEDURE — 85025 COMPLETE CBC W/AUTO DIFF WBC: CPT

## 2023-12-04 PROCEDURE — C8929 TTE W OR WO FOL WCON,DOPPLER: HCPCS

## 2023-12-04 PROCEDURE — 94003 VENT MGMT INPAT SUBQ DAY: CPT

## 2023-12-04 RX ORDER — LABETALOL HYDROCHLORIDE 5 MG/ML
10 INJECTION, SOLUTION INTRAVENOUS EVERY 6 HOURS PRN
Status: DISCONTINUED | OUTPATIENT
Start: 2023-12-04 | End: 2023-12-11 | Stop reason: HOSPADM

## 2023-12-04 RX ORDER — POLYETHYLENE GLYCOL 3350 17 G/17G
17 POWDER, FOR SOLUTION ORAL DAILY
Status: DISCONTINUED | OUTPATIENT
Start: 2023-12-04 | End: 2023-12-08

## 2023-12-04 RX ORDER — METFORMIN HYDROCHLORIDE 500 MG/1
500 TABLET, EXTENDED RELEASE ORAL
COMMUNITY

## 2023-12-04 RX ORDER — OMEPRAZOLE 40 MG/1
40 CAPSULE, DELAYED RELEASE ORAL DAILY
COMMUNITY

## 2023-12-04 RX ORDER — LOSARTAN POTASSIUM 25 MG/1
25 TABLET ORAL DAILY
COMMUNITY

## 2023-12-04 RX ORDER — LANSOPRAZOLE 30 MG/1
30 TABLET, ORALLY DISINTEGRATING, DELAYED RELEASE ORAL DAILY
Status: DISCONTINUED | OUTPATIENT
Start: 2023-12-05 | End: 2023-12-11 | Stop reason: HOSPADM

## 2023-12-04 RX ORDER — SENNA AND DOCUSATE SODIUM 50; 8.6 MG/1; MG/1
2 TABLET, FILM COATED ORAL DAILY
Status: DISCONTINUED | OUTPATIENT
Start: 2023-12-04 | End: 2023-12-11 | Stop reason: HOSPADM

## 2023-12-04 RX ORDER — BUDESONIDE AND FORMOTEROL FUMARATE DIHYDRATE 160; 4.5 UG/1; UG/1
2 AEROSOL RESPIRATORY (INHALATION) 2 TIMES DAILY
COMMUNITY

## 2023-12-04 RX ADMIN — IPRATROPIUM BROMIDE AND ALBUTEROL SULFATE 1 DOSE: 2.5; .5 SOLUTION RESPIRATORY (INHALATION) at 15:37

## 2023-12-04 RX ADMIN — BUDESONIDE 250 MCG: 0.25 INHALANT RESPIRATORY (INHALATION) at 20:01

## 2023-12-04 RX ADMIN — PROPOFOL 50 MCG/KG/MIN: 10 INJECTION, EMULSION INTRAVENOUS at 03:59

## 2023-12-04 RX ADMIN — SODIUM ZIRCONIUM CYCLOSILICATE 10 G: 10 POWDER, FOR SUSPENSION ORAL at 15:01

## 2023-12-04 RX ADMIN — POLYETHYLENE GLYCOL (3350) 17 G: 17 POWDER, FOR SOLUTION ORAL at 12:15

## 2023-12-04 RX ADMIN — PROPOFOL 50 MCG/KG/MIN: 10 INJECTION, EMULSION INTRAVENOUS at 18:08

## 2023-12-04 RX ADMIN — IPRATROPIUM BROMIDE AND ALBUTEROL SULFATE 1 DOSE: 2.5; .5 SOLUTION RESPIRATORY (INHALATION) at 20:01

## 2023-12-04 RX ADMIN — ENOXAPARIN SODIUM 40 MG: 100 INJECTION SUBCUTANEOUS at 07:49

## 2023-12-04 RX ADMIN — DOCUSATE SODIUM 50 MG AND SENNOSIDES 8.6 MG 2 TABLET: 8.6; 5 TABLET, FILM COATED ORAL at 12:14

## 2023-12-04 RX ADMIN — CEFTRIAXONE 1000 MG: 1 INJECTION, POWDER, FOR SOLUTION INTRAMUSCULAR; INTRAVENOUS at 14:54

## 2023-12-04 RX ADMIN — PANTOPRAZOLE SODIUM 40 MG: 40 TABLET, DELAYED RELEASE ORAL at 06:33

## 2023-12-04 RX ADMIN — PROPOFOL 50 MCG/KG/MIN: 10 INJECTION, EMULSION INTRAVENOUS at 22:07

## 2023-12-04 RX ADMIN — SODIUM CHLORIDE, PRESERVATIVE FREE 10 ML: 5 INJECTION INTRAVENOUS at 07:50

## 2023-12-04 RX ADMIN — PROPOFOL 50 MCG/KG/MIN: 10 INJECTION, EMULSION INTRAVENOUS at 07:52

## 2023-12-04 RX ADMIN — BUDESONIDE 250 MCG: 0.25 INHALANT RESPIRATORY (INHALATION) at 07:45

## 2023-12-04 RX ADMIN — PERFLUTREN 1.5 ML: 6.52 INJECTION, SUSPENSION INTRAVENOUS at 13:30

## 2023-12-04 RX ADMIN — INSULIN LISPRO 1 UNITS: 100 INJECTION, SOLUTION INTRAVENOUS; SUBCUTANEOUS at 14:47

## 2023-12-04 RX ADMIN — WATER 40 MG: 1 INJECTION INTRAMUSCULAR; INTRAVENOUS; SUBCUTANEOUS at 07:50

## 2023-12-04 RX ADMIN — IPRATROPIUM BROMIDE AND ALBUTEROL SULFATE 1 DOSE: 2.5; .5 SOLUTION RESPIRATORY (INHALATION) at 12:06

## 2023-12-04 RX ADMIN — PROPOFOL 50 MCG/KG/MIN: 10 INJECTION, EMULSION INTRAVENOUS at 12:42

## 2023-12-04 RX ADMIN — IPRATROPIUM BROMIDE AND ALBUTEROL SULFATE 1 DOSE: 2.5; .5 SOLUTION RESPIRATORY (INHALATION) at 07:45

## 2023-12-04 RX ADMIN — SODIUM CHLORIDE, PRESERVATIVE FREE 10 ML: 5 INJECTION INTRAVENOUS at 19:55

## 2023-12-04 ASSESSMENT — PAIN SCALES - GENERAL
PAINLEVEL_OUTOF10: 0

## 2023-12-04 ASSESSMENT — PULMONARY FUNCTION TESTS
PIF_VALUE: 27
PIF_VALUE: 32

## 2023-12-04 NOTE — CONSULTS
Comprehensive Nutrition Assessment    RECOMMENDATIONS:  Initiate Vital HP (High protein peptide formula) at 20 mL/hr and as tolerated, increase by 10 mL/hr q 4 hours until goal of 30 mL/hr is met via OG   +1 Prosource daily  Flushes: 100 mL q4h  Monitor TF tolerance (abd distention, bowel habits, N/V, cramping)  Check TG while on diprivan infusion    Nutrition Education: Education not appropriate     NUTRITION ASSESSMENT:   Nutritional summary & status: Consult for TF ordering and management: Pt intubated, no pressor requirements, sedated on propofol. Propofol providing 549 kcal/day. Started on diabetic TF, will modify to low calorie/high protein formula while pt remains on high rate of propol. New formula providing 1004mg K. EMR wt hx inaccurate, previous OP visit shows pt wt stable since August. Hyperglycemia increased d/t steroid use, currently <200 and not requiring insuin, but may need to add once TF is at all. Admission // PMH: COPD exacerbation w/ respiratory failure//Hyperkalemia, T2DM    MALNUTRITION ASSESSMENT  Context of Malnutrition: Acute Illness   Malnutrition Status:  At risk for malnutrition (Comment)  Findings of the 6 clinical characteristics of malnutrition (Minimum of 2 out of 6 clinical characteristics is required to make the diagnosis of moderate or severe Protein Calorie Malnutrition based on AND/ASPEN Guidelines):  Energy Intake:  Mild decrease in energy intake (Comment)  Weight Loss:  No significant weight loss     Body Fat Loss:  Unable to assess     Muscle Mass Loss:  Unable to assess    Fluid Accumulation:  No significant fluid accumulation      NUTRITION DIAGNOSIS   Inadequate oral intake related to impaired respiratory function as evidenced by intubation    Nutrition Monitoring and Evaluation:   Food/Nutrient Intake Outcomes:  Enteral Nutrition Intake/Tolerance  Physical Signs/Symptoms Outcomes:  Biochemical Data, Nutrition Focused Physical Findings, Weight, Hemodynamic Status

## 2023-12-04 NOTE — PROGRESS NOTES
V2.0    [facility] [place]      Name:  Aakash Zamudio /Age/Sex: 1960  (61 y.o. female)   MRN & CSN:  [id number] & [id number] Encounter Date/Time: 2023 1:39 PM EST   Location:  02/6974-17 PCP: No primary care provider on file. Attending:Ricardo Medina MD       Hospital Day: 3    Assessment and Recommendations   59-year-old female with history of COPD and previous hospitalizations presenting with shortness of breath and increased work of breathing and admitted with acute hypercapnic respiratory failure requiring mechanical intubation. Upon presentation patient was found tachycardic, tachypneic, with severe respiratory distress. Lab work-up revealing resolved respiratory acidosis and revealing what appears to be some chronic hypercapnia with metabolic compensation. Also recently hyperkalemic. Patient remains intubated and dependent on the mechanical ventilator. On propofol and receiving tube feed. Elevated troponin elevated BNP. 2D echo ordered. On antibiotics and steroids. Acute on chronic hypercapnic respiratory failure  Ventilator dependent respiratory failure  COPD  Hyperkalemia  Respiratory acidosis -resolved  Elevated troponin  Elevated BNP  Hyperglycemia      Plan:   I agree with continuing ICU care. Ventilator management as per ICU team.  Appreciate recommendations. Continue to trend electrolytes and treat hyperkalemia with 1 dose of Lokelma  Monitor blood glucose levels. Continue sliding scale. Follow-up 2D echo. Diet Diet NPO  DIET TUBE FEED VOLUME BASED WITH DIET Low Calorie High Protein (Vital HP);  Orogastric; Continuous; 720; 24   DVT Prophylaxis [x] Lovenox, heparin, SCDs, ambulation Eliquis, Xarelto  Coumadin   Code Status Full Code   Disposition From: Home  Expected Disposition: TBD  Estimated Date of Discharge: TBD  Patient requires continued admission due to respiratory failure require mechanical ventilation   Surrogate Decision Maker/ POA Not on file

## 2023-12-04 NOTE — PROGRESS NOTES
Clinical Pharmacy Progress Note  Medication History     Admit Date: 12/2/2023    List of of current medications patient is taking is complete. Home Medication list in EPIC updated to reflect changes noted below. Source of information: CareEverywhere documentation, pharmacy fills, OARRS report    Changes made to medication list:   Medications removed:   APAP prn  Diclofenac 75mg BID - from 2015  Gabapentin 100mg TID - from 2015  Foltx - from 2015  Advair 500/50 - from 2015  Percocet 5/325mg - from 2016  Atorvastatin 40mg daily - last filled in March 2023, also not on [facility] PCP medication list  Medications added:   Symbicort inh  Losartan  Omeprazole  Spiriva respimat  Medication doses adjusted:   Metformin - changed dosage form from IR to ER form    Current Outpatient Medications   Medication Instructions    albuterol sulfate HFA (PROVENTIL;VENTOLIN;PROAIR) 108 (90 Base) MCG/ACT inhaler 2 puffs, Inhalation, EVERY 6 HOURS PRN    azithromycin (ZITHROMAX) 250 MG tablet Take 2 tablets (500 mg) on Day 1, followed by 1 tablet (250 mg) once daily on Days 2 through 5.    budesonide-formoterol (SYMBICORT) 160-4.5 MCG/ACT AERO 2 puffs, Inhalation, 2 TIMES DAILY    losartan (COZAAR) 25 mg, Oral, DAILY    metFORMIN (GLUCOPHAGE-XR) 500 mg, Oral, DAILY WITH BREAKFAST    omeprazole (PRILOSEC) 40 mg, Oral, DAILY    predniSONE (DELTASONE) 10 MG tablet Take 4 tablets by mouth once daily for 5 days    tiotropium (SPIRIVA RESPIMAT) 2.5 MCG/ACT AERS inhaler 2 puffs, Inhalation, DAILY RESP       Please call with any questions.   Ran Price PharmD., BCPS   12/4/2023 9:51 AM  Wireless: [phone]

## 2023-12-04 NOTE — CARE COORDINATION
Case Management Assessment  Initial Evaluation    Date/Time of Evaluation: 12/4/2023 8:50 AM  Assessment Completed by: Judy Cavazos RN    If patient is discharged prior to next notation, then this note serves as note for discharge by case management. Patient Name: Kristan Krabbe                   YOB: 1960  Diagnosis: COPD (chronic obstructive pulmonary disease) ([facility]) [J44.9]  Hypoxia [R09.02]  Elevated troponin [R79.89]  COPD exacerbation ([facility]) [J44.1]                   Date / Time: 12/2/2023 10:20 AM    Patient Admission Status: Inpatient   Readmission Risk (Low < 19, Mod (19-27), High > 27): Readmission Risk Score: 17.1    Current PCP: No primary care provider on file. PCP verified by CM?  Yes Count includes the [facility])    Chart Reviewed: Yes      History Provided by: Medical Record  Patient Orientation: Unable to Assess, Sedated    Patient Cognition: Other (see comment) (intubated and sedated)    Hospitalization in the last 30 days (Readmission):  Yes    Readmission Assessment  Number of Days since last admission?: 8-30 days  Previous Disposition: Home with Family  Who is being Interviewed: Caregiver (CHART REVIEW)  What was the patient's/caregiver's perception as to why they think they needed to return back to the hospital?: Other (Comment) (RESP DISTRESS)  Did you visit your Primary Care Physician after you left the hospital, before you returned this time?: No  Why weren't you able to visit your PCP?: Did not have an appointment  Did you see a specialist, such as Cardiac, Pulmonary, Orthopedic Physician, etc. after you left the hospital?: No  Who advised the patient to return to the hospital?: Self-referral  Does the patient report anything that got in the way of taking their medications?: No  In our efforts to provide the best possible care to you and others like you, can you think of anything that we could have done to help you after you left the hospital the first time, so that you might not have

## 2023-12-04 NOTE — PLAN OF CARE
Problem: Respiratory - Adult  Goal: Achieves optimal ventilation and oxygenation  12/4/2023 0846 by Gwendolyn Rojo RN  Outcome: Progressing  Flowsheets (Taken 12/4/2023 0800)  Achieves optimal ventilation and oxygenation:   Assess for changes in respiratory status   Assess for changes in mentation and behavior   Position to facilitate oxygenation and minimize respiratory effort   Oxygen supplementation based on oxygen saturation or arterial blood gases   Initiate smoking cessation protocol as indicated   Encourage broncho-pulmonary hygiene including cough, deep breathe, incentive spirometry   Assess the need for suctioning and aspirate as needed   Assess and instruct to report shortness of breath or any respiratory difficulty   Respiratory therapy support as indicated     Problem: Discharge Planning  Goal: Discharge to home or other facility with appropriate resources  12/4/2023 0846 by Gwendolyn Rojo RN  Outcome: Progressing  Flowsheets (Taken 12/4/2023 0800)  Discharge to home or other facility with appropriate resources:   Identify barriers to discharge with patient and caregiver   Arrange for needed discharge resources and transportation as appropriate   Identify discharge learning needs (meds, wound care, etc)   Refer to discharge planning if patient needs post-hospital services based on physician order or complex needs related to functional status, cognitive ability or social support system     Problem: Pain  Goal: Verbalizes/displays adequate comfort level or baseline comfort level  12/4/2023 0846 by Gwendolyn Rojo RN  Outcome: Progressing     Problem: Safety - Adult  Goal: Free from fall injury  12/4/2023 0846 by Gwendolyn Rojo RN  Outcome: Progressing  Flowsheets (Taken 12/4/2023 0800)  Free From Fall Injury:   Instruct family/caregiver on patient safety   Based on caregiver fall risk screen, instruct family/caregiver to ask for assistance with transferring infant if caregiver noted to have fall risk

## 2023-12-04 NOTE — PROGRESS NOTES
Patient's daughter was called and updated regarding patient's care. All questions were answered.        Karlos Pearson MD, PGY-2  ICU

## 2023-12-04 NOTE — PLAN OF CARE
Problem: Respiratory - Adult  Goal: Achieves optimal ventilation and oxygenation  Outcome: Progressing     Problem: Discharge Planning  Goal: Discharge to home or other facility with appropriate resources  Outcome: Progressing     Problem: Pain  Goal: Verbalizes/displays adequate comfort level or baseline comfort level  Outcome: Progressing     Problem: Safety - Adult  Goal: Free from fall injury  Outcome: Progressing     Problem: Safety - Medical Restraint  Goal: Remains free of injury from restraints (Restraint for Interference with Medical Device)  Description: INTERVENTIONS:  1. Determine that other, less restrictive measures have been tried or would not be effective before applying the restraint  2. Evaluate the patient's condition at the time of restraint application  3. Inform patient/family regarding the reason for restraint  4.  Q2H: Monitor safety, psychosocial status, comfort, nutrition and hydration  Outcome: Progressing  Flowsheets  Taken 12/4/2023 0000 by Keith Gann RN  Remains free of injury from restraints (restraint for interference with medical device):   Determine that other, less restrictive measures have been tried or would not be effective before applying the restraint   Evaluate the patient's condition at the time of restraint application   Inform patient/family regarding the reason for restraint   Every 2 hours: Monitor safety, psychosocial status, comfort, nutrition and hydration  Taken 12/3/2023 2329 by Keith Gann RN  Remains free of injury from restraints (restraint for interference with medical device):   Determine that other, less restrictive measures have been tried or would not be effective before applying the restraint   Evaluate the patient's condition at the time of restraint application   Inform patient/family regarding the reason for restraint   Every 2 hours: Monitor safety, psychosocial status, comfort, nutrition and hydration  Taken 12/3/2023 2200 by Keith Gann RN  Remains free of injury from restraints (restraint for interference with medical device):   Determine that other, less restrictive measures have been tried or would not be effective before applying the restraint   Evaluate the patient's condition at the time of restraint application   Inform patient/family regarding the reason for restraint   Every 2 hours: Monitor safety, psychosocial status, comfort, nutrition and hydration  Taken 12/3/2023 2000 by Tiburcio Barnett RN  Remains free of injury from restraints (restraint for interference with medical device):   Determine that other, less restrictive measures have been tried or would not be effective before applying the restraint   Evaluate the patient's condition at the time of restraint application   Inform patient/family regarding the reason for restraint   Every 2 hours: Monitor safety, psychosocial status, comfort, nutrition and hydration  Taken 12/3/2023 1600 by Prerna Mejia RN  Remains free of injury from restraints (restraint for interference with medical device):   Determine that other, less restrictive measures have been tried or would not be effective before applying the restraint   Evaluate the patient's condition at the time of restraint application   Inform patient/family regarding the reason for restraint   Every 2 hours: Monitor safety, psychosocial status, comfort, nutrition and hydration  Taken 12/3/2023 1400 by Prerna Mejia RN  Remains free of injury from restraints (restraint for interference with medical device):   Determine that other, less restrictive measures have been tried or would not be effective before applying the restraint   Evaluate the patient's condition at the time of restraint application   Inform patient/family regarding the reason for restraint   Every 2 hours: Monitor safety, psychosocial status, comfort, nutrition and hydration     Problem: Skin/Tissue Integrity  Goal: Absence of new skin breakdown  Description: 1.   Monitor for areas of

## 2023-12-05 ENCOUNTER — APPOINTMENT (OUTPATIENT)
Dept: GENERAL RADIOLOGY | Age: 63
End: 2023-12-05
Payer: COMMERCIAL

## 2023-12-05 LAB
ALBUMIN SERPL-MCNC: 3.8 G/DL (ref 3.4–5)
ALP SERPL-CCNC: 54 U/L (ref 40–129)
ALT SERPL-CCNC: 366 U/L (ref 10–40)
ANION GAP SERPL CALCULATED.3IONS-SCNC: 10 MMOL/L (ref 3–16)
AST SERPL-CCNC: 135 U/L (ref 15–37)
BACTERIA SPEC RESP CULT: ABNORMAL
BACTERIA SPEC RESP CULT: ABNORMAL
BASOPHILS # BLD: 0 K/UL (ref 0–0.2)
BASOPHILS NFR BLD: 0.2 %
BILIRUB DIRECT SERPL-MCNC: <0.2 MG/DL (ref 0–0.3)
BILIRUB INDIRECT SERPL-MCNC: ABNORMAL MG/DL (ref 0–1)
BILIRUB SERPL-MCNC: <0.2 MG/DL (ref 0–1)
BUN SERPL-MCNC: 21 MG/DL (ref 7–20)
CALCIUM SERPL-MCNC: 9.7 MG/DL (ref 8.3–10.6)
CHLORIDE SERPL-SCNC: 98 MMOL/L (ref 99–110)
CO2 SERPL-SCNC: 32 MMOL/L (ref 21–32)
CREAT SERPL-MCNC: 0.7 MG/DL (ref 0.6–1.2)
DEPRECATED RDW RBC AUTO: 18.4 % (ref 12.4–15.4)
EOSINOPHIL # BLD: 0 K/UL (ref 0–0.6)
EOSINOPHIL NFR BLD: 0.1 %
GFR SERPLBLD CREATININE-BSD FMLA CKD-EPI: >60 ML/MIN/{1.73_M2}
GLUCOSE BLD-MCNC: 150 MG/DL (ref 70–99)
GLUCOSE BLD-MCNC: 154 MG/DL (ref 70–99)
GLUCOSE BLD-MCNC: 159 MG/DL (ref 70–99)
GLUCOSE BLD-MCNC: 180 MG/DL (ref 70–99)
GLUCOSE BLD-MCNC: 248 MG/DL (ref 70–99)
GLUCOSE SERPL-MCNC: 133 MG/DL (ref 70–99)
GRAM STN SPEC: ABNORMAL
HCT VFR BLD AUTO: 36.1 % (ref 36–48)
HGB BLD-MCNC: 11.2 G/DL (ref 12–16)
LYMPHOCYTES # BLD: 3.4 K/UL (ref 1–5.1)
LYMPHOCYTES NFR BLD: 32.5 %
MAGNESIUM SERPL-MCNC: 2.1 MG/DL (ref 1.8–2.4)
MCH RBC QN AUTO: 23 PG (ref 26–34)
MCHC RBC AUTO-ENTMCNC: 31.1 G/DL (ref 31–36)
MCV RBC AUTO: 74.1 FL (ref 80–100)
MONOCYTES # BLD: 1.4 K/UL (ref 0–1.3)
MONOCYTES NFR BLD: 13.5 %
NEUTROPHILS # BLD: 5.6 K/UL (ref 1.7–7.7)
NEUTROPHILS NFR BLD: 53.7 %
ORGANISM: ABNORMAL
PERFORMED ON: ABNORMAL
PHOSPHATE SERPL-MCNC: 4 MG/DL (ref 2.5–4.9)
PLATELET # BLD AUTO: 243 K/UL (ref 135–450)
PMV BLD AUTO: 7.9 FL (ref 5–10.5)
POTASSIUM SERPL-SCNC: 5.1 MMOL/L (ref 3.5–5.1)
PROT SERPL-MCNC: 6.9 G/DL (ref 6.4–8.2)
RBC # BLD AUTO: 4.88 M/UL (ref 4–5.2)
SODIUM SERPL-SCNC: 140 MMOL/L (ref 136–145)
TRIGL SERPL-MCNC: 224 MG/DL (ref 0–150)
WBC # BLD AUTO: 10.5 K/UL (ref 4–11)

## 2023-12-05 PROCEDURE — 2580000003 HC RX 258

## 2023-12-05 PROCEDURE — 83735 ASSAY OF MAGNESIUM: CPT

## 2023-12-05 PROCEDURE — 84478 ASSAY OF TRIGLYCERIDES: CPT

## 2023-12-05 PROCEDURE — 2700000000 HC OXYGEN THERAPY PER DAY

## 2023-12-05 PROCEDURE — 36415 COLL VENOUS BLD VENIPUNCTURE: CPT

## 2023-12-05 PROCEDURE — 6370000000 HC RX 637 (ALT 250 FOR IP)

## 2023-12-05 PROCEDURE — 94640 AIRWAY INHALATION TREATMENT: CPT

## 2023-12-05 PROCEDURE — 84100 ASSAY OF PHOSPHORUS: CPT

## 2023-12-05 PROCEDURE — 6360000002 HC RX W HCPCS

## 2023-12-05 PROCEDURE — 80076 HEPATIC FUNCTION PANEL: CPT

## 2023-12-05 PROCEDURE — 6360000002 HC RX W HCPCS: Performed by: STUDENT IN AN ORGANIZED HEALTH CARE EDUCATION/TRAINING PROGRAM

## 2023-12-05 PROCEDURE — 94003 VENT MGMT INPAT SUBQ DAY: CPT

## 2023-12-05 PROCEDURE — 2000000000 HC ICU R&B

## 2023-12-05 PROCEDURE — 74018 RADEX ABDOMEN 1 VIEW: CPT

## 2023-12-05 PROCEDURE — 85025 COMPLETE CBC W/AUTO DIFF WBC: CPT

## 2023-12-05 PROCEDURE — 80048 BASIC METABOLIC PNL TOTAL CA: CPT

## 2023-12-05 PROCEDURE — 94761 N-INVAS EAR/PLS OXIMETRY MLT: CPT

## 2023-12-05 PROCEDURE — 6370000000 HC RX 637 (ALT 250 FOR IP): Performed by: INTERNAL MEDICINE

## 2023-12-05 PROCEDURE — 99291 CRITICAL CARE FIRST HOUR: CPT | Performed by: INTERNAL MEDICINE

## 2023-12-05 RX ORDER — PROPOFOL 10 MG/ML
5-50 INJECTION, EMULSION INTRAVENOUS CONTINUOUS
Status: DISCONTINUED | OUTPATIENT
Start: 2023-12-05 | End: 2023-12-06

## 2023-12-05 RX ADMIN — ENOXAPARIN SODIUM 40 MG: 100 INJECTION SUBCUTANEOUS at 08:19

## 2023-12-05 RX ADMIN — PROPOFOL 40 MCG/KG/MIN: 10 INJECTION, EMULSION INTRAVENOUS at 06:00

## 2023-12-05 RX ADMIN — INSULIN LISPRO 1 UNITS: 100 INJECTION, SOLUTION INTRAVENOUS; SUBCUTANEOUS at 15:25

## 2023-12-05 RX ADMIN — BUDESONIDE 250 MCG: 0.25 INHALANT RESPIRATORY (INHALATION) at 07:30

## 2023-12-05 RX ADMIN — IPRATROPIUM BROMIDE AND ALBUTEROL SULFATE 1 DOSE: 2.5; .5 SOLUTION RESPIRATORY (INHALATION) at 15:16

## 2023-12-05 RX ADMIN — PREDNISONE 40 MG: 20 TABLET ORAL at 08:19

## 2023-12-05 RX ADMIN — BUDESONIDE 250 MCG: 0.25 INHALANT RESPIRATORY (INHALATION) at 20:15

## 2023-12-05 RX ADMIN — SODIUM CHLORIDE, PRESERVATIVE FREE 10 ML: 5 INJECTION INTRAVENOUS at 20:56

## 2023-12-05 RX ADMIN — IPRATROPIUM BROMIDE AND ALBUTEROL SULFATE 1 DOSE: 2.5; .5 SOLUTION RESPIRATORY (INHALATION) at 20:15

## 2023-12-05 RX ADMIN — AZITHROMYCIN MONOHYDRATE 500 MG: 500 INJECTION, POWDER, LYOPHILIZED, FOR SOLUTION INTRAVENOUS at 08:13

## 2023-12-05 RX ADMIN — PROPOFOL 35 MCG/KG/MIN: 10 INJECTION, EMULSION INTRAVENOUS at 22:24

## 2023-12-05 RX ADMIN — POLYETHYLENE GLYCOL (3350) 17 G: 17 POWDER, FOR SOLUTION ORAL at 08:20

## 2023-12-05 RX ADMIN — PROPOFOL 30 MCG/KG/MIN: 10 INJECTION, EMULSION INTRAVENOUS at 16:00

## 2023-12-05 RX ADMIN — IPRATROPIUM BROMIDE AND ALBUTEROL SULFATE 1 DOSE: 2.5; .5 SOLUTION RESPIRATORY (INHALATION) at 11:27

## 2023-12-05 RX ADMIN — DOCUSATE SODIUM 50 MG AND SENNOSIDES 8.6 MG 2 TABLET: 8.6; 5 TABLET, FILM COATED ORAL at 08:19

## 2023-12-05 RX ADMIN — CEFTRIAXONE 1000 MG: 1 INJECTION, POWDER, FOR SOLUTION INTRAMUSCULAR; INTRAVENOUS at 14:54

## 2023-12-05 RX ADMIN — LANSOPRAZOLE 30 MG: 30 TABLET, ORALLY DISINTEGRATING, DELAYED RELEASE ORAL at 08:19

## 2023-12-05 RX ADMIN — SODIUM CHLORIDE, PRESERVATIVE FREE 10 ML: 5 INJECTION INTRAVENOUS at 08:20

## 2023-12-05 RX ADMIN — IPRATROPIUM BROMIDE AND ALBUTEROL SULFATE 1 DOSE: 2.5; .5 SOLUTION RESPIRATORY (INHALATION) at 07:30

## 2023-12-05 ASSESSMENT — PULMONARY FUNCTION TESTS
PIF_VALUE: 26
PIF_VALUE: 31
PIF_VALUE: 31

## 2023-12-05 ASSESSMENT — PAIN SCALES - GENERAL
PAINLEVEL_OUTOF10: 0

## 2023-12-05 NOTE — PROGRESS NOTES
Pt. Gagging with propofol completely off. This is interfering with the SBT. Verbal order to restart propofol at a low dose to help patient through SBT.

## 2023-12-05 NOTE — PROGRESS NOTES
Propofol stopped per Dr. Eldon Rasmussen for SBT. Explained SBT process to the patient.      Mr. Avni Braun called and updated as per his request.

## 2023-12-05 NOTE — PROGRESS NOTES
RSBI is 107, RR 30, Pt is currently gagging on ET Tube. Pt pulls good volumes when she isn't gagging. I recommended Precedex to nurse and then try again.

## 2023-12-05 NOTE — PLAN OF CARE
Problem: Respiratory - Adult  Goal: Achieves optimal ventilation and oxygenation  12/4/2023 1935 by Gilmer Huber RN  Outcome: Progressing     Problem: Safety - Medical Restraint  Goal: Remains free of injury from restraints (Restraint for Interference with Medical Device)  Description: INTERVENTIONS:  1. Determine that other, less restrictive measures have been tried or would not be effective before applying the restraint  2. Evaluate the patient's condition at the time of restraint application  3. Inform patient/family regarding the reason for restraint  4. Q2H: Monitor safety, psychosocial status, comfort, nutrition and hydration  12/4/2023 1935 by Gilmer Huber RN  Outcome: Progressing  Problem: Pain  Goal: Verbalizes/displays adequate comfort level or baseline comfort level  12/4/2023 1935 by Gilmer Huber RN  Outcome: Progressing     Problem: Safety - Adult  Goal: Free from fall injury  12/4/2023 1935 by Gilmer Huber RN  Outcome: Progressing     Problem: Nutrition Deficit:  Goal: Optimize nutritional status  Outcome: Progressing     Problem: Chronic Conditions and Co-morbidities  Goal: Patient's chronic conditions and co-morbidity symptoms are monitored and maintained or improved  12/4/2023 1935 by Gilmer Huber RN  Outcome: Progressing     Problem: Skin/Tissue Integrity  Goal: Absence of new skin breakdown  Description: 1. Monitor for areas of redness and/or skin breakdown  2. Assess vascular access sites hourly  3. Every 4-6 hours minimum:  Change oxygen saturation probe site  4. Every 4-6 hours:  If on nasal continuous positive airway pressure, respiratory therapy assess nares and determine need for appliance change or resting period.   12/4/2023 1935 by Gilmer Huber RN  Outcome: Progressing     Problem: Discharge Planning  Goal: Discharge to home or other facility with appropriate resources  12/4/2023 1935 by Gilmer Huber RN  Outcome: Progressing

## 2023-12-06 LAB
ANION GAP SERPL CALCULATED.3IONS-SCNC: 9 MMOL/L (ref 3–16)
BASE EXCESS BLDA CALC-SCNC: 12 MMOL/L (ref -3–3)
BASOPHILS # BLD: 0 K/UL (ref 0–0.2)
BASOPHILS NFR BLD: 0.4 %
BUN SERPL-MCNC: 23 MG/DL (ref 7–20)
CA-I BLD-SCNC: 1.29 MMOL/L (ref 1.12–1.32)
CALCIUM SERPL-MCNC: 9.8 MG/DL (ref 8.3–10.6)
CHLORIDE SERPL-SCNC: 96 MMOL/L (ref 99–110)
CO2 BLDA-SCNC: 39 MMOL/L
CO2 SERPL-SCNC: 33 MMOL/L (ref 21–32)
CREAT SERPL-MCNC: 0.6 MG/DL (ref 0.6–1.2)
DEPRECATED RDW RBC AUTO: 18.2 % (ref 12.4–15.4)
EOSINOPHIL # BLD: 0.1 K/UL (ref 0–0.6)
EOSINOPHIL NFR BLD: 0.4 %
GFR SERPLBLD CREATININE-BSD FMLA CKD-EPI: >60 ML/MIN/{1.73_M2}
GLUCOSE BLD-MCNC: 208 MG/DL (ref 70–99)
GLUCOSE BLD-MCNC: 229 MG/DL (ref 70–99)
GLUCOSE BLD-MCNC: 230 MG/DL (ref 70–99)
GLUCOSE BLD-MCNC: 233 MG/DL (ref 70–99)
GLUCOSE BLD-MCNC: 254 MG/DL (ref 70–99)
GLUCOSE BLD-MCNC: 294 MG/DL (ref 70–99)
GLUCOSE SERPL-MCNC: 225 MG/DL (ref 70–99)
HCO3 BLDA-SCNC: 37.2 MMOL/L (ref 21–29)
HCT VFR BLD AUTO: 36.8 % (ref 36–48)
HGB BLD-MCNC: 11.8 G/DL (ref 12–16)
LACTATE BLD-SCNC: 0.56 MMOL/L (ref 0.4–2)
LYMPHOCYTES # BLD: 2.9 K/UL (ref 1–5.1)
LYMPHOCYTES NFR BLD: 25.1 %
MAGNESIUM SERPL-MCNC: 1.9 MG/DL (ref 1.8–2.4)
MCH RBC QN AUTO: 23.3 PG (ref 26–34)
MCHC RBC AUTO-ENTMCNC: 32.2 G/DL (ref 31–36)
MCV RBC AUTO: 72.2 FL (ref 80–100)
MONOCYTES # BLD: 1.5 K/UL (ref 0–1.3)
MONOCYTES NFR BLD: 12.8 %
NEUTROPHILS # BLD: 7.2 K/UL (ref 1.7–7.7)
NEUTROPHILS NFR BLD: 61.3 %
PCO2 BLDA: 63.8 MM HG (ref 35–45)
PERFORMED ON: ABNORMAL
PH BLDA: 7.37 [PH] (ref 7.35–7.45)
PLATELET # BLD AUTO: 269 K/UL (ref 135–450)
PMV BLD AUTO: 7.9 FL (ref 5–10.5)
PO2 BLDA: 73.5 MM HG (ref 75–108)
POC SAMPLE TYPE: ABNORMAL
POTASSIUM BLD-SCNC: 4.5 MMOL/L (ref 3.5–5.1)
POTASSIUM SERPL-SCNC: 4.9 MMOL/L (ref 3.5–5.1)
RBC # BLD AUTO: 5.09 M/UL (ref 4–5.2)
SAO2 % BLDA: 94 % (ref 93–100)
SODIUM BLD-SCNC: 139 MMOL/L (ref 136–145)
SODIUM SERPL-SCNC: 138 MMOL/L (ref 136–145)
WBC # BLD AUTO: 11.7 K/UL (ref 4–11)

## 2023-12-06 PROCEDURE — 36415 COLL VENOUS BLD VENIPUNCTURE: CPT

## 2023-12-06 PROCEDURE — 84295 ASSAY OF SERUM SODIUM: CPT

## 2023-12-06 PROCEDURE — 94640 AIRWAY INHALATION TREATMENT: CPT

## 2023-12-06 PROCEDURE — 82803 BLOOD GASES ANY COMBINATION: CPT

## 2023-12-06 PROCEDURE — 99291 CRITICAL CARE FIRST HOUR: CPT | Performed by: INTERNAL MEDICINE

## 2023-12-06 PROCEDURE — 6360000002 HC RX W HCPCS

## 2023-12-06 PROCEDURE — 6370000000 HC RX 637 (ALT 250 FOR IP)

## 2023-12-06 PROCEDURE — 2700000000 HC OXYGEN THERAPY PER DAY

## 2023-12-06 PROCEDURE — 2000000000 HC ICU R&B

## 2023-12-06 PROCEDURE — 82330 ASSAY OF CALCIUM: CPT

## 2023-12-06 PROCEDURE — 6370000000 HC RX 637 (ALT 250 FOR IP): Performed by: INTERNAL MEDICINE

## 2023-12-06 PROCEDURE — 84132 ASSAY OF SERUM POTASSIUM: CPT

## 2023-12-06 PROCEDURE — 83735 ASSAY OF MAGNESIUM: CPT

## 2023-12-06 PROCEDURE — 80048 BASIC METABOLIC PNL TOTAL CA: CPT

## 2023-12-06 PROCEDURE — 83605 ASSAY OF LACTIC ACID: CPT

## 2023-12-06 PROCEDURE — 85025 COMPLETE CBC W/AUTO DIFF WBC: CPT

## 2023-12-06 PROCEDURE — 82947 ASSAY GLUCOSE BLOOD QUANT: CPT

## 2023-12-06 PROCEDURE — 2500000003 HC RX 250 WO HCPCS

## 2023-12-06 PROCEDURE — 94761 N-INVAS EAR/PLS OXIMETRY MLT: CPT

## 2023-12-06 PROCEDURE — 6360000002 HC RX W HCPCS: Performed by: STUDENT IN AN ORGANIZED HEALTH CARE EDUCATION/TRAINING PROGRAM

## 2023-12-06 PROCEDURE — 94003 VENT MGMT INPAT SUBQ DAY: CPT

## 2023-12-06 PROCEDURE — 2580000003 HC RX 258

## 2023-12-06 RX ORDER — PROPOFOL 10 MG/ML
5-50 INJECTION, EMULSION INTRAVENOUS CONTINUOUS
Status: DISCONTINUED | OUTPATIENT
Start: 2023-12-06 | End: 2023-12-07 | Stop reason: ALTCHOICE

## 2023-12-06 RX ADMIN — IPRATROPIUM BROMIDE AND ALBUTEROL SULFATE 1 DOSE: 2.5; .5 SOLUTION RESPIRATORY (INHALATION) at 07:55

## 2023-12-06 RX ADMIN — PROPOFOL 15 MCG/KG/MIN: 10 INJECTION, EMULSION INTRAVENOUS at 21:21

## 2023-12-06 RX ADMIN — ENOXAPARIN SODIUM 40 MG: 100 INJECTION SUBCUTANEOUS at 08:33

## 2023-12-06 RX ADMIN — INSULIN LISPRO 1 UNITS: 100 INJECTION, SOLUTION INTRAVENOUS; SUBCUTANEOUS at 02:54

## 2023-12-06 RX ADMIN — DOCUSATE SODIUM 50 MG AND SENNOSIDES 8.6 MG 2 TABLET: 8.6; 5 TABLET, FILM COATED ORAL at 08:33

## 2023-12-06 RX ADMIN — BUDESONIDE 250 MCG: 0.25 INHALANT RESPIRATORY (INHALATION) at 20:38

## 2023-12-06 RX ADMIN — DEXMEDETOMIDINE HYDROCHLORIDE 0.2 MCG/KG/HR: 100 INJECTION, SOLUTION INTRAVENOUS at 13:58

## 2023-12-06 RX ADMIN — SODIUM CHLORIDE, PRESERVATIVE FREE 10 ML: 5 INJECTION INTRAVENOUS at 21:16

## 2023-12-06 RX ADMIN — IPRATROPIUM BROMIDE AND ALBUTEROL SULFATE 1 DOSE: 2.5; .5 SOLUTION RESPIRATORY (INHALATION) at 12:37

## 2023-12-06 RX ADMIN — PROPOFOL 35 MCG/KG/MIN: 10 INJECTION, EMULSION INTRAVENOUS at 04:38

## 2023-12-06 RX ADMIN — PREDNISONE 40 MG: 20 TABLET ORAL at 08:33

## 2023-12-06 RX ADMIN — INSULIN LISPRO 1 UNITS: 100 INJECTION, SOLUTION INTRAVENOUS; SUBCUTANEOUS at 08:41

## 2023-12-06 RX ADMIN — CEFTRIAXONE 1000 MG: 1 INJECTION, POWDER, FOR SOLUTION INTRAMUSCULAR; INTRAVENOUS at 15:41

## 2023-12-06 RX ADMIN — BUDESONIDE 250 MCG: 0.25 INHALANT RESPIRATORY (INHALATION) at 07:55

## 2023-12-06 RX ADMIN — POLYETHYLENE GLYCOL (3350) 17 G: 17 POWDER, FOR SOLUTION ORAL at 08:33

## 2023-12-06 RX ADMIN — IPRATROPIUM BROMIDE AND ALBUTEROL SULFATE 1 DOSE: 2.5; .5 SOLUTION RESPIRATORY (INHALATION) at 15:36

## 2023-12-06 RX ADMIN — LANSOPRAZOLE 30 MG: 30 TABLET, ORALLY DISINTEGRATING, DELAYED RELEASE ORAL at 08:33

## 2023-12-06 RX ADMIN — AZITHROMYCIN MONOHYDRATE 500 MG: 500 INJECTION, POWDER, LYOPHILIZED, FOR SOLUTION INTRAVENOUS at 07:11

## 2023-12-06 RX ADMIN — INSULIN LISPRO 2 UNITS: 100 INJECTION, SOLUTION INTRAVENOUS; SUBCUTANEOUS at 15:47

## 2023-12-06 RX ADMIN — IPRATROPIUM BROMIDE AND ALBUTEROL SULFATE 1 DOSE: 2.5; .5 SOLUTION RESPIRATORY (INHALATION) at 20:38

## 2023-12-06 RX ADMIN — PROPOFOL 25 MCG/KG/MIN: 10 INJECTION, EMULSION INTRAVENOUS at 14:05

## 2023-12-06 RX ADMIN — INSULIN LISPRO 1 UNITS: 100 INJECTION, SOLUTION INTRAVENOUS; SUBCUTANEOUS at 21:15

## 2023-12-06 ASSESSMENT — PAIN SCALES - GENERAL
PAINLEVEL_OUTOF10: 0

## 2023-12-06 ASSESSMENT — PULMONARY FUNCTION TESTS
PIF_VALUE: 29
PIF_VALUE: 29

## 2023-12-06 NOTE — PLAN OF CARE
Problem: Respiratory - Adult  Goal: Achieves optimal ventilation and oxygenation  Outcome: Progressing     Problem: Safety - Medical Restraint  Goal: Remains free of injury from restraints (Restraint for Interference with Medical Device)  Description: INTERVENTIONS:  1. Determine that other, less restrictive measures have been tried or would not be effective before applying the restraint  2. Evaluate the patient's condition at the time of restraint application  3. Inform patient/family regarding the reason for restraint  4. Q2H: Monitor safety, psychosocial status, comfort, nutrition and hydration  Outcome: Progressing    Problem: Pain  Goal: Verbalizes/displays adequate comfort level or baseline comfort level  12/4/2023 1935 by Ivan Pope RN  Outcome: Progressing     Problem: Safety - Adult  Goal: Free from fall injury  Outcome: Progressing     Problem: Nutrition Deficit:  Goal: Optimize nutritional status  Outcome: Progressing     Problem: Chronic Conditions and Co-morbidities  Goal: Patient's chronic conditions and co-morbidity symptoms are monitored and maintained or improved  Outcome: Progressing     Problem: Skin/Tissue Integrity  Goal: Absence of new skin breakdown  Description: 1. Monitor for areas of redness and/or skin breakdown  2. Assess vascular access sites hourly  3. Every 4-6 hours minimum:  Change oxygen saturation probe site  4. Every 4-6 hours:  If on nasal continuous positive airway pressure, respiratory therapy assess nares and determine need for appliance change or resting period.   Outcome: Progressing     Problem: Discharge Planning  Goal: Discharge to home or other facility with appropriate resources  Outcome: Progressing

## 2023-12-06 NOTE — PROGRESS NOTES
Attempted SBT. Pt. With low volumes and respiratory rate greater than 35. Propofol at 10mcg/kg/min. Pt. Very agitated with stimulation. Will keep stimulation to a minimum in order to complete SBT.

## 2023-12-06 NOTE — PROGRESS NOTES
SBT completed, ABG drawn and in chart. Pt. Switched back to AC/PRVC and sedation increased slightly to accommodate pt. Comfort and agitation.

## 2023-12-06 NOTE — CARE COORDINATION
Case management is following for discharge planning. The chart was reviewed. Ms. Miguelina Barajas remains in the ICU intubated and sedated. Plan SBT today. Ms. Miguelina Barajas is from home with her grandson. The home setting is an apartment. She is independent with self care and functional mobility at baseline. CM will reassess for disposition and service needs once she is more medically stable.     Patel Aquino RN  Case Management  [phone]

## 2023-12-07 ENCOUNTER — APPOINTMENT (OUTPATIENT)
Dept: GENERAL RADIOLOGY | Age: 63
End: 2023-12-07
Payer: COMMERCIAL

## 2023-12-07 LAB
ANION GAP SERPL CALCULATED.3IONS-SCNC: 7 MMOL/L (ref 3–16)
BASE EXCESS BLDA CALC-SCNC: 14 MMOL/L (ref -3–3)
BASOPHILS # BLD: 0 K/UL (ref 0–0.2)
BASOPHILS NFR BLD: 0.2 %
BUN SERPL-MCNC: 32 MG/DL (ref 7–20)
CA-I BLD-SCNC: 1.31 MMOL/L (ref 1.12–1.32)
CALCIUM SERPL-MCNC: 9.9 MG/DL (ref 8.3–10.6)
CHLORIDE SERPL-SCNC: 96 MMOL/L (ref 99–110)
CO2 BLDA-SCNC: 40 MMOL/L
CO2 SERPL-SCNC: 34 MMOL/L (ref 21–32)
CREAT SERPL-MCNC: 0.7 MG/DL (ref 0.6–1.2)
DEPRECATED RDW RBC AUTO: 18.2 % (ref 12.4–15.4)
EOSINOPHIL # BLD: 0.3 K/UL (ref 0–0.6)
EOSINOPHIL NFR BLD: 2.5 %
GFR SERPLBLD CREATININE-BSD FMLA CKD-EPI: >60 ML/MIN/{1.73_M2}
GLUCOSE BLD-MCNC: 142 MG/DL (ref 70–99)
GLUCOSE BLD-MCNC: 192 MG/DL (ref 70–99)
GLUCOSE BLD-MCNC: 197 MG/DL (ref 70–99)
GLUCOSE BLD-MCNC: 219 MG/DL (ref 70–99)
GLUCOSE BLD-MCNC: 225 MG/DL (ref 70–99)
GLUCOSE BLD-MCNC: 246 MG/DL (ref 70–99)
GLUCOSE SERPL-MCNC: 231 MG/DL (ref 70–99)
HCO3 BLDA-SCNC: 38.3 MMOL/L (ref 21–29)
HCT VFR BLD AUTO: 35.2 % (ref 36–48)
HGB BLD-MCNC: 11.2 G/DL (ref 12–16)
LACTATE BLD-SCNC: 0.43 MMOL/L (ref 0.4–2)
LYMPHOCYTES # BLD: 3.9 K/UL (ref 1–5.1)
LYMPHOCYTES NFR BLD: 31.9 %
MAGNESIUM SERPL-MCNC: 2.1 MG/DL (ref 1.8–2.4)
MCH RBC QN AUTO: 23.1 PG (ref 26–34)
MCHC RBC AUTO-ENTMCNC: 31.9 G/DL (ref 31–36)
MCV RBC AUTO: 72.6 FL (ref 80–100)
MONOCYTES # BLD: 1.3 K/UL (ref 0–1.3)
MONOCYTES NFR BLD: 10.9 %
NEUTROPHILS # BLD: 6.7 K/UL (ref 1.7–7.7)
NEUTROPHILS NFR BLD: 54.5 %
PCO2 BLDA: 59.5 MM HG (ref 35–45)
PERFORMED ON: ABNORMAL
PH BLDA: 7.42 [PH] (ref 7.35–7.45)
PLATELET # BLD AUTO: 244 K/UL (ref 135–450)
PMV BLD AUTO: 8.5 FL (ref 5–10.5)
PO2 BLDA: 56.3 MM HG (ref 75–108)
POC SAMPLE TYPE: ABNORMAL
POTASSIUM BLD-SCNC: 4.3 MMOL/L (ref 3.5–5.1)
POTASSIUM SERPL-SCNC: 4.3 MMOL/L (ref 3.5–5.1)
RBC # BLD AUTO: 4.85 M/UL (ref 4–5.2)
SAO2 % BLDA: 88 % (ref 93–100)
SODIUM BLD-SCNC: 140 MMOL/L (ref 136–145)
SODIUM SERPL-SCNC: 137 MMOL/L (ref 136–145)
WBC # BLD AUTO: 12.3 K/UL (ref 4–11)

## 2023-12-07 PROCEDURE — 80048 BASIC METABOLIC PNL TOTAL CA: CPT

## 2023-12-07 PROCEDURE — 82803 BLOOD GASES ANY COMBINATION: CPT

## 2023-12-07 PROCEDURE — 6360000002 HC RX W HCPCS: Performed by: INTERNAL MEDICINE

## 2023-12-07 PROCEDURE — 74230 X-RAY XM SWLNG FUNCJ C+: CPT

## 2023-12-07 PROCEDURE — 94003 VENT MGMT INPAT SUBQ DAY: CPT

## 2023-12-07 PROCEDURE — 85025 COMPLETE CBC W/AUTO DIFF WBC: CPT

## 2023-12-07 PROCEDURE — 94640 AIRWAY INHALATION TREATMENT: CPT

## 2023-12-07 PROCEDURE — 6360000002 HC RX W HCPCS

## 2023-12-07 PROCEDURE — 83605 ASSAY OF LACTIC ACID: CPT

## 2023-12-07 PROCEDURE — 2580000003 HC RX 258

## 2023-12-07 PROCEDURE — 6370000000 HC RX 637 (ALT 250 FOR IP)

## 2023-12-07 PROCEDURE — 2000000000 HC ICU R&B

## 2023-12-07 PROCEDURE — 36415 COLL VENOUS BLD VENIPUNCTURE: CPT

## 2023-12-07 PROCEDURE — 6370000000 HC RX 637 (ALT 250 FOR IP): Performed by: INTERNAL MEDICINE

## 2023-12-07 PROCEDURE — 84132 ASSAY OF SERUM POTASSIUM: CPT

## 2023-12-07 PROCEDURE — 92526 ORAL FUNCTION THERAPY: CPT

## 2023-12-07 PROCEDURE — 92611 MOTION FLUOROSCOPY/SWALLOW: CPT

## 2023-12-07 PROCEDURE — 84295 ASSAY OF SERUM SODIUM: CPT

## 2023-12-07 PROCEDURE — 99291 CRITICAL CARE FIRST HOUR: CPT | Performed by: INTERNAL MEDICINE

## 2023-12-07 PROCEDURE — 82330 ASSAY OF CALCIUM: CPT

## 2023-12-07 PROCEDURE — 2700000000 HC OXYGEN THERAPY PER DAY

## 2023-12-07 PROCEDURE — 92610 EVALUATE SWALLOWING FUNCTION: CPT

## 2023-12-07 PROCEDURE — 2500000003 HC RX 250 WO HCPCS

## 2023-12-07 PROCEDURE — 83735 ASSAY OF MAGNESIUM: CPT

## 2023-12-07 PROCEDURE — 6360000002 HC RX W HCPCS: Performed by: STUDENT IN AN ORGANIZED HEALTH CARE EDUCATION/TRAINING PROGRAM

## 2023-12-07 PROCEDURE — 82947 ASSAY GLUCOSE BLOOD QUANT: CPT

## 2023-12-07 PROCEDURE — 94761 N-INVAS EAR/PLS OXIMETRY MLT: CPT

## 2023-12-07 RX ORDER — INSULIN LISPRO 100 [IU]/ML
0-4 INJECTION, SOLUTION INTRAVENOUS; SUBCUTANEOUS NIGHTLY
Status: DISCONTINUED | OUTPATIENT
Start: 2023-12-07 | End: 2023-12-11 | Stop reason: HOSPADM

## 2023-12-07 RX ORDER — ALBUTEROL SULFATE 2.5 MG/3ML
2.5 SOLUTION RESPIRATORY (INHALATION) EVERY 4 HOURS PRN
Status: DISCONTINUED | OUTPATIENT
Start: 2023-12-07 | End: 2023-12-11 | Stop reason: HOSPADM

## 2023-12-07 RX ORDER — ARFORMOTEROL TARTRATE 15 UG/2ML
15 SOLUTION RESPIRATORY (INHALATION)
Status: DISCONTINUED | OUTPATIENT
Start: 2023-12-07 | End: 2023-12-11 | Stop reason: HOSPADM

## 2023-12-07 RX ORDER — INSULIN LISPRO 100 [IU]/ML
0-8 INJECTION, SOLUTION INTRAVENOUS; SUBCUTANEOUS
Status: DISCONTINUED | OUTPATIENT
Start: 2023-12-07 | End: 2023-12-11 | Stop reason: HOSPADM

## 2023-12-07 RX ADMIN — ARFORMOTEROL TARTRATE 15 MCG: 15 SOLUTION RESPIRATORY (INHALATION) at 21:26

## 2023-12-07 RX ADMIN — PREDNISONE 40 MG: 20 TABLET ORAL at 08:20

## 2023-12-07 RX ADMIN — LANSOPRAZOLE 30 MG: 30 TABLET, ORALLY DISINTEGRATING, DELAYED RELEASE ORAL at 08:20

## 2023-12-07 RX ADMIN — BUDESONIDE 250 MCG: 0.25 INHALANT RESPIRATORY (INHALATION) at 21:26

## 2023-12-07 RX ADMIN — ARFORMOTEROL TARTRATE 15 MCG: 15 SOLUTION RESPIRATORY (INHALATION) at 09:35

## 2023-12-07 RX ADMIN — DEXMEDETOMIDINE HYDROCHLORIDE 0.4 MCG/KG/HR: 100 INJECTION, SOLUTION INTRAVENOUS at 03:48

## 2023-12-07 RX ADMIN — INSULIN LISPRO 1 UNITS: 100 INJECTION, SOLUTION INTRAVENOUS; SUBCUTANEOUS at 12:00

## 2023-12-07 RX ADMIN — POLYETHYLENE GLYCOL (3350) 17 G: 17 POWDER, FOR SOLUTION ORAL at 08:28

## 2023-12-07 RX ADMIN — BUDESONIDE 250 MCG: 0.25 INHALANT RESPIRATORY (INHALATION) at 07:59

## 2023-12-07 RX ADMIN — ENOXAPARIN SODIUM 40 MG: 100 INJECTION SUBCUTANEOUS at 08:20

## 2023-12-07 RX ADMIN — INSULIN LISPRO 1 UNITS: 100 INJECTION, SOLUTION INTRAVENOUS; SUBCUTANEOUS at 03:44

## 2023-12-07 RX ADMIN — IPRATROPIUM BROMIDE AND ALBUTEROL SULFATE 1 DOSE: 2.5; .5 SOLUTION RESPIRATORY (INHALATION) at 21:26

## 2023-12-07 RX ADMIN — ACETAMINOPHEN 650 MG: 325 TABLET ORAL at 18:07

## 2023-12-07 RX ADMIN — DOCUSATE SODIUM 50 MG AND SENNOSIDES 8.6 MG 2 TABLET: 8.6; 5 TABLET, FILM COATED ORAL at 08:20

## 2023-12-07 RX ADMIN — IPRATROPIUM BROMIDE AND ALBUTEROL SULFATE 1 DOSE: 2.5; .5 SOLUTION RESPIRATORY (INHALATION) at 12:52

## 2023-12-07 RX ADMIN — ALBUTEROL SULFATE 2.5 MG: 2.5 SOLUTION RESPIRATORY (INHALATION) at 09:40

## 2023-12-07 RX ADMIN — SODIUM CHLORIDE, PRESERVATIVE FREE 10 ML: 5 INJECTION INTRAVENOUS at 20:51

## 2023-12-07 RX ADMIN — IPRATROPIUM BROMIDE AND ALBUTEROL SULFATE 1 DOSE: 2.5; .5 SOLUTION RESPIRATORY (INHALATION) at 07:59

## 2023-12-07 ASSESSMENT — PULMONARY FUNCTION TESTS
PIF_VALUE: 30
PIF_VALUE: 28
PIF_VALUE: 28

## 2023-12-07 ASSESSMENT — PAIN SCALES - GENERAL
PAINLEVEL_OUTOF10: 0
PAINLEVEL_OUTOF10: 0

## 2023-12-07 NOTE — PROGRESS NOTES
No acute event overnight. VSS, NAD. Pt open eyes to voice or spontaneously, follow commands all extremities, nod head to communicate.

## 2023-12-07 NOTE — PLAN OF CARE
Problem: Respiratory - Adult  Goal: Achieves optimal ventilation and oxygenation  Outcome: Progressing     Problem: Safety - Medical Restraint  Goal: Remains free of injury from restraints (Restraint for Interference with Medical Device)  Description: INTERVENTIONS:  1. Determine that other, less restrictive measures have been tried or would not be effective before applying the restraint  2. Evaluate the patient's condition at the time of restraint application  3. Inform patient/family regarding the reason for restraint  4. Q2H: Monitor safety, psychosocial status, comfort, nutrition and hydration  Outcome: Progressing     Problem: Pain  Goal: Verbalizes/displays adequate comfort level or baseline comfort level  12/4/2023 1935 by Dell Acuña RN  Outcome: Progressing     Problem: Safety - Adult  Goal: Free from fall injury  Outcome: Progressing     Problem: Nutrition Deficit:  Goal: Optimize nutritional status  Outcome: Progressing     Problem: Chronic Conditions and Co-morbidities  Goal: Patient's chronic conditions and co-morbidity symptoms are monitored and maintained or improved  Outcome: Progressing     Problem: Skin/Tissue Integrity  Goal: Absence of new skin breakdown  Description: 1. Monitor for areas of redness and/or skin breakdown  2. Assess vascular access sites hourly  3. Every 4-6 hours minimum:  Change oxygen saturation probe site  4. Every 4-6 hours:  If on nasal continuous positive airway pressure, respiratory therapy assess nares and determine need for appliance change or resting period.   Outcome: Progressing     Problem: Discharge Planning  Goal: Discharge to home or other facility with appropriate resources  Outcome: Progressing

## 2023-12-07 NOTE — PROGRESS NOTES
Comprehensive Nutrition Assessment    RECOMMENDATIONS:  PO Diet: Pending SLP eval  Nutrition Education: Education not indicated     NUTRITION ASSESSMENT:   Nutritional summary & status: Follow up: Pt extubated this AM, TF stopped and SLP consult placed. BG >200, received last dose of prednisone this AM, will place carb restriction once diet is advanced. Having bowel movements, no abdominal distention. Plan to continue current bowel regimen as pt has only had 1 BM since admission. Will monitor PO intakes and determine need for any nutrition intervention. Admission // PMH: COPD exacerbation w/ respiratory failure//Hyperkalemia, T2DM    MALNUTRITION ASSESSMENT  Context of Malnutrition: Acute Illness   Malnutrition Status: At risk for malnutrition (Comment)  Findings of the 6 clinical characteristics of malnutrition (Minimum of 2 out of 6 clinical characteristics is required to make the diagnosis of moderate or severe Protein Calorie Malnutrition based on AND/ASPEN Guidelines):  Energy Intake:  Mild decrease in energy intake (Comment)  Weight Loss:  No significant weight loss     Body Fat Loss:  Unable to assess     Muscle Mass Loss:  Unable to assess    Fluid Accumulation:  No significant fluid accumulation      NUTRITION DIAGNOSIS   Inadequate oral intake related to lack or limited access to food as evidenced by NPO or clear liquid status due to medical condition    Nutrition Monitoring and Evaluation:   Food/Nutrient Intake Outcomes:  Diet Advancement/Tolerance  Physical Signs/Symptoms Outcomes:  Biochemical Data, Nutrition Focused Physical Findings, Chewing or Swallowing, Weight, GI Status     OBJECTIVE DATA: Significant to nutrition assessment  Nutrition Related Findings: . Active bowel sounds. +BM 12/6. +1.8L.   Wounds: None  Nutrition Goals: Initiate PO diet     CURRENT NUTRITION THERAPIES  Diet NPO  PO Intake: NPO   PO Supplement Intake:NPO  Additional Sources of Calories/IVF:N/A     COMPARATIVE

## 2023-12-07 NOTE — PROGRESS NOTES
Patient has been successfully weaned from Mechanical Ventilation. RSBI before extubation was 83 with EtCO2 of 32 and SpO2 of 89 on 30% FiO2. Patient extubated and placed on 15 liters/min via non-rebreather face mask. Post extubation SpO2 is 92% with HR  94 bpm and RR 38 breaths/min. Patient had strong cough that was non-productive. Extubation Well tolerated by patient. Carrie Valente

## 2023-12-07 NOTE — PROGRESS NOTES
Oral care provided to patient. Given ice chips to try and a sip of water. Did okay with sip of water but will wait for SLP for advancement.

## 2023-12-07 NOTE — PROGRESS NOTES
Pt. On SBT. She is very anxious but responding to reassurance and appears to be comprehending the process.

## 2023-12-07 NOTE — PROGRESS NOTES
the gastroesophageal junction. Endotracheal tube in appropriate position. Date of onset: 12/2/2023    Current Diet:  Diet NPO    Treatment Diagnosis: dysphagia    Pain:  None indicated by any means    General:  Chart reviewed: Yes  Behavior/Cognition: awake, alert, pleasant, cooperative  Communication Observation: verbal  Follows Directions: yes  Dentition/Oral Mucosa: intact, clear  Oral motor exam: WFL   Vocal quality: hoarse  Respiratory Status/oxygen requirements: O2 via 9L HFNC  Vision/Hearing: adequate  Patient Complaint: hungry and thirsty  Patient Positioning: upright in bed  PLOF: from home with family, reportedly on regular diet/thin liquids    Prior Dysphagia History:   None found in chart review    Bedside Swallowing Evaluation Impression 12/7/2023  Concern for possible pharyngeal dysphagia, recommend further assessment via instrumental. Bedside, pt with dysphonic (hoarse) voice s/p recent extubation (intubated x 5 days). Assessed thins via tsp/cup/straw, puree, soft solid. Pt with positive oral acceptance, slowed oral prep, positive swallow movement, good oral clearance. Completed 3 oz screen w/o s/s aspiration, however coughing noted for solids. Recommended MBS to further assess prior to diet placement. Pt agreeable. Education  Purpose of visit, role of SLP, swallow function, recommendations. Pt voiced comprehension. Prognosis:  Prognosis for improvement: good  Barriers to reach goals: acute illness  Consulted and agree with results and recommendations: patient, RN    Treatment:  Dysphagia Goals: The pt will be seen  1-2 x to address the following goals:  Goal 1: Patient will participate in further assessment of swallow function as appropriate. Goal 2: Patient/caregiver will demonstrate understanding of swallowing concerns/recommendations.     Pt goal: eat / drink  Pt discharge goal: did not state    Total treatment time: 20 dys eval    Plan:  Continue per POC  Recommended diet: TBD pending MBS (planned for today)    Discharge Recommendation: TBD  Discussed with RN, Yarelis  Needs met prior to leaving room, call light within reach    [facility] [place], [address], .[id number]  Pg. # [phone]    This document will serve as a discharge summary if patient discharges prior to next visit.

## 2023-12-07 NOTE — PROCEDURES
INSTRUMENTAL SWALLOW REPORT  MODIFIED BARIUM SWALLOW  Speech Therapy Note    NAME: Roxy Monroy     : 1960  MRN: [id number]       Date of Eval: 2023     Ordering Physician: Dr. Roseann Mccloud  Radiologist: Dr. Niyah Cole  Referring Diagnosis: Dysphagia    Past Medical History:  has a past medical history of Asthma, COPD (chronic obstructive pulmonary disease) ([address]), Diabetes mellitus ([address]), and GERD (gastroesophageal reflux disease). Past Surgical History:  has a past surgical history that includes Colonoscopy; laparoscopy; [place] tooth extraction; other surgical history (2015); other surgical history; and Plantar fascia surgery (Left, 3/30/16). CXR: 2023  Nasogastric tube with tip in the midesophagus, over 15 cm superior to the  expected location of the gastroesophageal junction. Endotracheal tube in appropriate position. Prior MBS Results: NA    Patient Complaints/Reason for Referral:  Roxy Monroy was referred for a MBS to assess the efficiency of his/her swallow function, assess for aspiration, and to make recommendations regarding safe dietary consistencies, effective compensatory strategies, and safe eating environment. Onset of problem:   2023    Pain   None indicated by any means    Subjective:  Awake, alert, participatory with encouragement. Seated upright on stretcher, on 9L O2 via NC. Impressions:  Oral Phase  Mild dysfunction characterized by prolonged mastication of regular solid. Appropriate oral acceptance and clearance, no significant stasis. Pharyngeal Phase  Mild-moderate dysfunction characterized by impaired timing and strength, with delayed swallow onset, delayed and reduced hyolaryngeal elevation and epiglottic retraction. Resulted in deep penetration and tracheal aspiration of thin liquids; reactive cough present, but unable to clear.  Chin tuck initially appeared to prevent, but aspiration occurred on second trial. Improved timing and bolus

## 2023-12-07 NOTE — PROGRESS NOTES
Pt. Able to ambulate to and from stretcher for modified barium swallow. Diet order placed. Thickened coffee given to the patient.

## 2023-12-08 LAB
ANION GAP SERPL CALCULATED.3IONS-SCNC: 8 MMOL/L (ref 3–16)
BASOPHILS # BLD: 0.1 K/UL (ref 0–0.2)
BASOPHILS NFR BLD: 0.5 %
BUN SERPL-MCNC: 24 MG/DL (ref 7–20)
CALCIUM SERPL-MCNC: 10 MG/DL (ref 8.3–10.6)
CHLORIDE SERPL-SCNC: 99 MMOL/L (ref 99–110)
CO2 SERPL-SCNC: 34 MMOL/L (ref 21–32)
CREAT SERPL-MCNC: 0.6 MG/DL (ref 0.6–1.2)
DEPRECATED RDW RBC AUTO: 17.9 % (ref 12.4–15.4)
EOSINOPHIL # BLD: 0.3 K/UL (ref 0–0.6)
EOSINOPHIL NFR BLD: 2.3 %
GFR SERPLBLD CREATININE-BSD FMLA CKD-EPI: >60 ML/MIN/{1.73_M2}
GLUCOSE BLD-MCNC: 131 MG/DL (ref 70–99)
GLUCOSE BLD-MCNC: 187 MG/DL (ref 70–99)
GLUCOSE BLD-MCNC: 225 MG/DL (ref 70–99)
GLUCOSE BLD-MCNC: 266 MG/DL (ref 70–99)
GLUCOSE SERPL-MCNC: 153 MG/DL (ref 70–99)
HCT VFR BLD AUTO: 35.3 % (ref 36–48)
HGB BLD-MCNC: 11.1 G/DL (ref 12–16)
LYMPHOCYTES # BLD: 4.7 K/UL (ref 1–5.1)
LYMPHOCYTES NFR BLD: 38.1 %
MAGNESIUM SERPL-MCNC: 1.9 MG/DL (ref 1.8–2.4)
MCH RBC QN AUTO: 22.9 PG (ref 26–34)
MCHC RBC AUTO-ENTMCNC: 31.3 G/DL (ref 31–36)
MCV RBC AUTO: 72.9 FL (ref 80–100)
MONOCYTES # BLD: 1.5 K/UL (ref 0–1.3)
MONOCYTES NFR BLD: 11.9 %
NEUTROPHILS # BLD: 5.8 K/UL (ref 1.7–7.7)
NEUTROPHILS NFR BLD: 47.2 %
PERFORMED ON: ABNORMAL
PLATELET # BLD AUTO: 262 K/UL (ref 135–450)
PMV BLD AUTO: 8.3 FL (ref 5–10.5)
POTASSIUM SERPL-SCNC: 4 MMOL/L (ref 3.5–5.1)
RBC # BLD AUTO: 4.84 M/UL (ref 4–5.2)
SODIUM SERPL-SCNC: 141 MMOL/L (ref 136–145)
WBC # BLD AUTO: 12.3 K/UL (ref 4–11)

## 2023-12-08 PROCEDURE — 2060000000 HC ICU INTERMEDIATE R&B

## 2023-12-08 PROCEDURE — 94640 AIRWAY INHALATION TREATMENT: CPT

## 2023-12-08 PROCEDURE — 2580000003 HC RX 258

## 2023-12-08 PROCEDURE — 97535 SELF CARE MNGMENT TRAINING: CPT

## 2023-12-08 PROCEDURE — 6370000000 HC RX 637 (ALT 250 FOR IP)

## 2023-12-08 PROCEDURE — 97116 GAIT TRAINING THERAPY: CPT

## 2023-12-08 PROCEDURE — 97162 PT EVAL MOD COMPLEX 30 MIN: CPT

## 2023-12-08 PROCEDURE — 6360000002 HC RX W HCPCS: Performed by: INTERNAL MEDICINE

## 2023-12-08 PROCEDURE — 83735 ASSAY OF MAGNESIUM: CPT

## 2023-12-08 PROCEDURE — 6360000002 HC RX W HCPCS: Performed by: STUDENT IN AN ORGANIZED HEALTH CARE EDUCATION/TRAINING PROGRAM

## 2023-12-08 PROCEDURE — 2700000000 HC OXYGEN THERAPY PER DAY

## 2023-12-08 PROCEDURE — 6360000002 HC RX W HCPCS

## 2023-12-08 PROCEDURE — 80048 BASIC METABOLIC PNL TOTAL CA: CPT

## 2023-12-08 PROCEDURE — 6370000000 HC RX 637 (ALT 250 FOR IP): Performed by: INTERNAL MEDICINE

## 2023-12-08 PROCEDURE — 97530 THERAPEUTIC ACTIVITIES: CPT

## 2023-12-08 PROCEDURE — 85025 COMPLETE CBC W/AUTO DIFF WBC: CPT

## 2023-12-08 PROCEDURE — 92526 ORAL FUNCTION THERAPY: CPT

## 2023-12-08 PROCEDURE — 99232 SBSQ HOSP IP/OBS MODERATE 35: CPT | Performed by: INTERNAL MEDICINE

## 2023-12-08 PROCEDURE — 36415 COLL VENOUS BLD VENIPUNCTURE: CPT

## 2023-12-08 PROCEDURE — 94761 N-INVAS EAR/PLS OXIMETRY MLT: CPT

## 2023-12-08 PROCEDURE — 97166 OT EVAL MOD COMPLEX 45 MIN: CPT

## 2023-12-08 RX ORDER — TRAMADOL HYDROCHLORIDE 50 MG/1
50 TABLET ORAL EVERY 12 HOURS
Status: DISCONTINUED | OUTPATIENT
Start: 2023-12-08 | End: 2023-12-11 | Stop reason: HOSPADM

## 2023-12-08 RX ORDER — POLYETHYLENE GLYCOL 3350 17 G/17G
17 POWDER, FOR SOLUTION ORAL DAILY
Status: DISCONTINUED | OUTPATIENT
Start: 2023-12-09 | End: 2023-12-11 | Stop reason: HOSPADM

## 2023-12-08 RX ADMIN — SODIUM CHLORIDE, PRESERVATIVE FREE 10 ML: 5 INJECTION INTRAVENOUS at 19:49

## 2023-12-08 RX ADMIN — ARFORMOTEROL TARTRATE 15 MCG: 15 SOLUTION RESPIRATORY (INHALATION) at 08:08

## 2023-12-08 RX ADMIN — BUDESONIDE 250 MCG: 0.25 INHALANT RESPIRATORY (INHALATION) at 08:08

## 2023-12-08 RX ADMIN — ALBUTEROL SULFATE 2.5 MG: 2.5 SOLUTION RESPIRATORY (INHALATION) at 11:55

## 2023-12-08 RX ADMIN — IPRATROPIUM BROMIDE AND ALBUTEROL SULFATE 1 DOSE: 2.5; .5 SOLUTION RESPIRATORY (INHALATION) at 20:36

## 2023-12-08 RX ADMIN — ENOXAPARIN SODIUM 40 MG: 100 INJECTION SUBCUTANEOUS at 08:33

## 2023-12-08 RX ADMIN — POLYETHYLENE GLYCOL (3350) 17 G: 17 POWDER, FOR SOLUTION ORAL at 08:33

## 2023-12-08 RX ADMIN — IPRATROPIUM BROMIDE AND ALBUTEROL SULFATE 1 DOSE: 2.5; .5 SOLUTION RESPIRATORY (INHALATION) at 15:48

## 2023-12-08 RX ADMIN — INSULIN LISPRO 2 UNITS: 100 INJECTION, SOLUTION INTRAVENOUS; SUBCUTANEOUS at 11:28

## 2023-12-08 RX ADMIN — ACETAMINOPHEN 650 MG: 325 TABLET ORAL at 08:57

## 2023-12-08 RX ADMIN — BUDESONIDE 250 MCG: 0.25 INHALANT RESPIRATORY (INHALATION) at 20:36

## 2023-12-08 RX ADMIN — LANSOPRAZOLE 30 MG: 30 TABLET, ORALLY DISINTEGRATING, DELAYED RELEASE ORAL at 08:33

## 2023-12-08 RX ADMIN — ARFORMOTEROL TARTRATE 15 MCG: 15 SOLUTION RESPIRATORY (INHALATION) at 20:36

## 2023-12-08 RX ADMIN — INSULIN LISPRO 4 UNITS: 100 INJECTION, SOLUTION INTRAVENOUS; SUBCUTANEOUS at 17:00

## 2023-12-08 RX ADMIN — DOCUSATE SODIUM 50 MG AND SENNOSIDES 8.6 MG 2 TABLET: 8.6; 5 TABLET, FILM COATED ORAL at 08:33

## 2023-12-08 RX ADMIN — TRAMADOL HYDROCHLORIDE 50 MG: 50 TABLET, COATED ORAL at 16:07

## 2023-12-08 RX ADMIN — IPRATROPIUM BROMIDE AND ALBUTEROL SULFATE 1 DOSE: 2.5; .5 SOLUTION RESPIRATORY (INHALATION) at 08:08

## 2023-12-08 RX ADMIN — SODIUM CHLORIDE, PRESERVATIVE FREE 10 ML: 5 INJECTION INTRAVENOUS at 08:33

## 2023-12-08 ASSESSMENT — ENCOUNTER SYMPTOMS
ABDOMINAL PAIN: 0
ANAL BLEEDING: 0
BLOOD IN STOOL: 0
SHORTNESS OF BREATH: 0
COUGH: 0
DIARRHEA: 0
NAUSEA: 0
ABDOMINAL DISTENTION: 0
CONSTIPATION: 0

## 2023-12-08 ASSESSMENT — PAIN DESCRIPTION - ORIENTATION
ORIENTATION: POSTERIOR
ORIENTATION: POSTERIOR
ORIENTATION: POSTERIOR;MID
ORIENTATION: POSTERIOR
ORIENTATION: POSTERIOR

## 2023-12-08 ASSESSMENT — PAIN SCALES - GENERAL
PAINLEVEL_OUTOF10: 4
PAINLEVEL_OUTOF10: 9
PAINLEVEL_OUTOF10: 2
PAINLEVEL_OUTOF10: 10
PAINLEVEL_OUTOF10: 0
PAINLEVEL_OUTOF10: 8
PAINLEVEL_OUTOF10: 2
PAINLEVEL_OUTOF10: 3
PAINLEVEL_OUTOF10: 8

## 2023-12-08 ASSESSMENT — PAIN DESCRIPTION - LOCATION
LOCATION: BACK

## 2023-12-08 ASSESSMENT — PAIN DESCRIPTION - ONSET
ONSET: ON-GOING
ONSET: GRADUAL
ONSET: ON-GOING
ONSET: ON-GOING

## 2023-12-08 ASSESSMENT — PAIN DESCRIPTION - FREQUENCY
FREQUENCY: CONTINUOUS
FREQUENCY: INTERMITTENT

## 2023-12-08 ASSESSMENT — PAIN DESCRIPTION - DESCRIPTORS
DESCRIPTORS: ACHING;DISCOMFORT;DULL
DESCRIPTORS: SHARP
DESCRIPTORS: ACHING
DESCRIPTORS: ACHING;DULL;DISCOMFORT
DESCRIPTORS: ACHING

## 2023-12-08 ASSESSMENT — PAIN - FUNCTIONAL ASSESSMENT
PAIN_FUNCTIONAL_ASSESSMENT: ACTIVITIES ARE NOT PREVENTED

## 2023-12-08 ASSESSMENT — PAIN DESCRIPTION - PAIN TYPE
TYPE: CHRONIC PAIN

## 2023-12-08 NOTE — PLAN OF CARE
Problem: Respiratory - Adult  Goal: Achieves optimal ventilation and oxygenation  Outcome: Progressing     Problem: Cardiovascular - Adult  Goal: Maintains optimal cardiac output and hemodynamic stability  Outcome: Progressing  Flowsheets (Taken 12/8/2023 0800)  Maintains optimal cardiac output and hemodynamic stability:   Monitor blood pressure and heart rate   Monitor urine output and notify Licensed Independent Practitioner for values outside of normal range   Assess for signs of decreased cardiac output   Administer fluid and/or volume expanders as ordered   Administer vasoactive medications as ordered     Problem: Skin/Tissue Integrity - Adult  Goal: Oral mucous membranes remain intact  Outcome: Progressing  Flowsheets (Taken 12/8/2023 0812)  Oral Mucous Membranes Remain Intact:   Assess oral mucosa and hygiene practices   Implement preventative oral hygiene regimen   Implement oral medicated treatments as ordered     Problem: Discharge Planning  Goal: Discharge to home or other facility with appropriate resources  Outcome: Progressing     Problem: Pain  Goal: Verbalizes/displays adequate comfort level or baseline comfort level  Outcome: Progressing     Problem: Safety - Adult  Goal: Free from fall injury  Outcome: Progressing  Flowsheets (Taken 12/8/2023 0812)  Free From Fall Injury:   Instruct family/caregiver on patient safety   Based on caregiver fall risk screen, instruct family/caregiver to ask for assistance with transferring infant if caregiver noted to have fall risk factors     Problem: Safety - Medical Restraint  Goal: Remains free of injury from restraints (Restraint for Interference with Medical Device)  Description: INTERVENTIONS:  1. Determine that other, less restrictive measures have been tried or would not be effective before applying the restraint  2. Evaluate the patient's condition at the time of restraint application  3. Inform patient/family regarding the reason for restraint  4.  Q2H:

## 2023-12-08 NOTE — PROGRESS NOTES
signed by Shun Abad MD      XR CHEST PORTABLE   Final Result      XR CHEST PORTABLE   Final Result      No acute chest process. Assessment/Plan:   Samara Terry is a 59-year-old female with past medical history of COPD who presented with worsening shortness of breath. # COPD exacerbation -improving  # Hypercapnic respiratory failure  S/p extubation 12/07  Patient presented to the ED earlier with worsening SOB over the last several days for which she was discharged with outpatient COPD treatment. Patient returned later to the ED with worsening SOB, initially on non- rebreather, transitioned to BiPAP 14/8. Patient was intubated and brought to ICU due to worsening ABG findings, increased work of breathing and risk of deterioration.  - DuoNebs as needed  - Pulmicort nebulizer twice daily   - Patient got Solu-Medrol for 3 days from 12/02 through 12/04.  - Patient got azithromycin 500 mg daily from 12/05 to 12/06  - Patient got 0.25 Mg terbutaline once 12/02  - Patient intubated 12/02-12/07  - Monitor for worsening respiratory status postextubation  - Follow-up ABG revealed improvement  - Follow-up sputum culture  - Empiric Rocephin  -Trending lactic acid    Troponinemia  Elevated troponins in the ED, elevated BNP  - Echo to evaluate for right heart strain revealed EF of 60 to 60%, normal diastolic function  -Troponins stable    DM2  BG running high, likely due to steroid use recently. Darwin is no longer on steroids. Hba1c 7.1  - consider small dose lantus nighly  - POC glucose checks  - LDSSI  - hypoglycemia protocol    Leukocytosis  Likely 2/2 leukocyte demargination de to steroid use  - monitor cbc  Monitor for signs of infection    Code Status: Full code  FEN: ADULT DIET; Dysphagia - Soft and Bite Sized; Mildly Thick (Nectar)   PPX: Lovenox  DISPO: ICU    Dunia Stark MD, PGY-1  12/08/23  2:15 AM        This patient has been staffed and discussed with Dr. Sherwin Dior.       Patient examined, findings as discussed with Dr. Linda Devine. Agree with assessment and plan. Respiratory failure substantially improved, now able to maintain on supplemental oxygen at low flow rate. COPD exacerbation resolving. Continue current treatment with ICS/LABA, and short acting bronchodilators throughout the day.   Doing well for transfer to medical floor

## 2023-12-08 NOTE — PROGRESS NOTES
Problem: Respiratory - Adult  Goal: Achieves optimal ventilation and oxygenation  Outcome: Progressing. Problem: Pain  Goal: Verbalizes/displays adequate comfort level or baseline comfort level  Outcome: Progressing     Problem: Safety - Adult  Goal: Free from fall injury  Outcome: Progressing     Problem: Nutrition Deficit:  Goal: Optimize nutritional status  Outcome: Progressing     Problem: Chronic Conditions and Co-morbidities  Goal: Patient's chronic conditions and co-morbidity symptoms are monitored and maintained or improved  Outcome: Progressing     Problem: Skin/Tissue Integrity  Goal: Absence of new skin breakdown  Description: 1. Monitor for areas of redness and/or skin breakdown  2. Assess vascular access sites hourly  3. Every 4-6 hours minimum:  Change oxygen saturation probe site  4. Every 4-6 hours:  If on nasal continuous positive airway pressure, respiratory therapy assess nares and determine need for appliance change or resting period.   Outcome: Progressing     Problem: Discharge Planning  Goal: Discharge to home or other facility with appropriate resources  Outcome: Progressing

## 2023-12-08 NOTE — PROGRESS NOTES
AA&Ox4, VSS, NAD. Congested with frequent coughing, expiratory wheezes throughout; on 9L HFNC, will wean down supplemental oxygen this shift.

## 2023-12-08 NOTE — PROGRESS NOTES
liquid, with single instance of shallow flash penetration, and no aspiration. No significant residual stasis. Upper Esophageal Screen  Indirectly assessed; reflux/regurgitation of liquid to hypopharynx observed x 1 when pt coughing. Of note, pt with prior hx GERD. Education  12/7: Purpose of visit, role of SLP, swallow function, recommendations. Pt voiced comprehension. 12/8: Discussed rationale for follow up treatment, diet, strategies/exercises. Demonstrated decent comprehension, ongoing education required. Prognosis:  Prognosis for improvement: good  Barriers to reach goals: acute illness  Consulted and agree with results and recommendations: patient, RN    Treatment:  Dysphagia Goals: The pt will be seen  1-2 x to address the following goals:  Goal 1: Patient will participate in further assessment of swallow function as appropriate. 12/8: MBS completed yesterday. Goal met. DC.    Goal 2: Patient/caregiver will demonstrate understanding of swallowing concerns/recommendations. 12/8: SLP provided education of MBS results, rationale for diet level and ongoing use of strategies/exercises with repeat MBS to be completed when deemed appropriate. Pt able to recall \"thin liquids went down the wrong way\". Required re-education of diet level and instruction of exercises. Pt with ? Full comprehension and ongoing education likely required. Cont. New goals s/p MBS  Goal 3: Patient will tolerate least restrictive diet with adequate oral prep and without overt signs of aspiration or associated decline in respiratory status. 12/8: Pt seated upright in chair and expressed tolerance of evening meal yesterday. Pt prompted to completed >25 effortful swallows with mildly thick liquids (declined trialing solids this date, despite AM meal tray present). Pt appeared to swallow. X2 instances of coughing after completion of swallow, however pt did not endorse any globus sensation.  Pt implemented well given min, fading to MI. Pt with reduced recall of rationale for thickened liquids, however agreeable after discussion. Pt additionally completed x2 sets x10 CTAR. Encouraged pt to complete additional sets during day. Strategies and diet level written on whiteboard for increased recall. Cont. Pt goal: eat / drink  Pt discharge goal: did not state    Total treatment time: 18 minutes dysphagia treatment     Plan:  Continue per POC  Recommended diet: Soft & Bite-Sized Solids / Mildly Thick Liquids (nectar) / meds in puree  - upright position  - small bites / sips  - slow rate  *encourage oral hygiene 2 x daily*    Discharge Recommendation: TBD closer to DC  Discussed with RNSafia  Needs met prior to leaving room, call light within reach    Electronically signed by:  Mita Rodrigues M.A., [facility]  Speech-Language Pathologist  Pg #: [phone]    This document will serve as a discharge summary if patient discharges prior to next visit.

## 2023-12-08 NOTE — PROGRESS NOTES
Speech Language Pathology  Attempt Note    SLP attempted to see pt for follow up treatment. Pt currently working with PT/OT. Will re-attempt later this date.      Electronically signed by:  Damari Serrano M.A., [place]  Speech-Language Pathologist  Pg #: [phone]

## 2023-12-08 NOTE — PROGRESS NOTES
ICU to Wards Transfer  Internal Medicine Wards Acceptance Note   The [facility] of [facility] transfer documentation has been acknowledged and reviewed by the wards team.  Additionally, the wards team has received official sign-out from the ICU team with acceptance of care of the patient. At the Time of transfer, patient was seen and examined by the wards team with the findings below:    Review of Systems  Resp: coughing up thick non-bloody sputum total approx 1 cup per day (previously 1/4 cup sputum production throughout the day). Cough increased from chronic cugh at home. Denies fevers/chills/sinus congestion/changes in taste or smell or hearing. Cardio: denies CP, denies exertional dyspnea   GI: Shree nausea/diarrhea, denies vomiting/hemtemesis   : No dysuria, no hematuria    Physical Exam  General: alert & oriented to self/location/year but not month/year. Head: Normocephalic, atraumatic, no visible or palpable masses  Eyes: conjunctiva clear, sclera non-icteric, EOM intact, PERR  Regular rate and rhythm, no murmurs appreciated, no ankle edema   Lungs: Clear to auscultation, protecting airway well, non-labored respirations   Abdomen: NL BS, no rigidity, no tenderness. Neuro: No focal deficits     Hospitalist assigned: Dr. Maricruz Hernadez   No changes at tjos time     Signature: Dr. Mercedes Rogers.  See with Nilda Wyatt

## 2023-12-08 NOTE — PROGRESS NOTES
shopping- family drive her to store)  Ambulation Assistance: Independent  Transfer Assistance: Independent  Active : No  Patient's  Info: daughters drive  Leisure & Hobbies: adult coloring books  Additional Comments: daughter can provide initial 24hr assist if needed upon d/c       Objective   Device: 3L 02       Toilet Transfers  Equipment Used: Standard toilet (R grab bar)  Toilet Transfer: Minimal assistance (x2 reps)  AROM: Within functional limits  Strength: Within functional limits  Coordination: Within functional limits  ADL  Grooming: Contact guard assistance (wash hands at sink)  LE Dressing: Moderate assistance (dependent to don socks; mod A to don underwear)  Toileting: Minimal assistance (hygiene seated, assist clothing mgmt)  Additional Comments: CGA to min A for standing balance during ADLs; tolerate standing 1 +1 +1 minutes          Bed mobility  Supine to Sit: Stand by assistance    Transfers  Stand Step Transfers: Minimal assistance (bed>chair using no AD, HHA)  Sit to stand: Minimal assistance (bed, chair)  Stand to sit: Minimal assistance (chair x3)    Functional Mobility: Pt ambulated to/from bathroom using no AD, noted to reach for support surfaces and unsteady, required hand held assist and min A for balance. Trialed RW 15' in room with CGA and improved steadiness noted; Pt new to use of walker- educated on safe hand placement during transfers    Activity Tolerance: 3L wall O2, per RN increase to 5/6L on portable tank for activity. O2 85% on 6L after toileting; 97% after short mobility in room.  Pt SOB with activity, requires frequent, prolonged rest breaks to recover with pursed lip breathing    Vision  Vision: Within Functional Limits (\"I need glasses but I don't have any\")  Hearing  Hearing: Within functional limits    Cognition  Overall Cognitive Status: [facility]  Cognition Comment: decreased insight into her current deficits  Orientation  Orientation Level: Oriented X4      Education Given To: Patient  Education Provided: Role of Therapy;Plan of Care;Precautions; ADL Adaptive Strategies;Transfer Training; Fall Prevention Strategies  Education Method: Verbal;Demonstration  Barriers to Learning:  (decreased insight/understanding of her deficits)  Education Outcome: Continued education needed;Verbalized understanding;Demonstrated understanding    [facility]- ADL  AM-[facility] Daily Activity - Inpatient   How much help is needed for putting on and taking off regular lower body clothing?: A Lot  How much help is needed for bathing (which includes washing, rinsing, drying)?: A Lot  How much help is needed for toileting (which includes using toilet, bedpan, or urinal)?: A Little  How much help is needed for putting on and taking off regular upper body clothing?: A Little  How much help is needed for taking care of personal grooming?: A Little  How much help for eating meals?: None  AM-[facility] Inpatient Daily Activity Raw Score: 17  AM-PAC Inpatient ADL T-Scale Score : 37.26  ADL Inpatient CMS 0-100% Score: 50.11  ADL Inpatient CMS G-Code Modifier : CK    Safety Devices  Type of Devices: Nurse notified; Chair alarm in place;Call light within reach; Left in chair       Goals  Short Term Goals  Time Frame for Short Term Goals: discharge  Short Term Goal 1: supervision toilet transfer- not met  Short Term Goal 2: tolerate standing 5 minutes for ADLs/mobility for ADLs- not met  Short Term Goal 3: SBA LB dressing- not met  Short Term Goal 4: SBA standing balance during ADLS- not met       Therapy Time   Individual Concurrent Group Co-treatment   Time In 0842         Time Out 0921         Minutes 39         Timed Code Treatment Minutes: [address] [facility]  [address], OT

## 2023-12-08 NOTE — PROGRESS NOTES
Physical Therapy  Facility/Department: [facility] ICU  Physical Therapy Initial Assessment and Treatment    Name: Kristan Krabbe  : 1960  MRN: [id number]  Date of Service: 2023    Discharge Recommendations:  Kristan Krabbe scored a 15/24 on the AM-PAC short mobility form. Current research shows that an AM-PAC score of 17 or less is typically not associated with a discharge to the patient's home setting. Based on the patient's AM-PAC score and their current functional mobility deficits, it is recommended that the patient have 3-5 sessions per week of Physical Therapy at d/c to increase the patient's independence. Please see assessment section for further patient specific details. If patient discharges prior to next session this note will serve as a discharge summary. Please see below for the latest assessment towards goals. Subacute/Skilled Nursing Facility (vs 24 hr assist/home PT)   PT Equipment Recommendations  Equipment Needed:  (rolling walker if goes home)      Patient Diagnosis(es): The primary encounter diagnosis was COPD exacerbation ([address]). Diagnoses of Hypoxia and Elevated troponin were also pertinent to this visit. Past Medical History:  has a past medical history of Asthma, COPD (chronic obstructive pulmonary disease) ([address]), Diabetes mellitus ([address]), and GERD (gastroesophageal reflux disease). Past Surgical History:  has a past surgical history that includes Colonoscopy; laparoscopy; [place] tooth extraction; other surgical history (2015); other surgical history; and Plantar fascia surgery (Left, 3/30/16). Assessment   Body Structures, Functions, Activity Limitations Requiring Skilled Therapeutic Intervention: Decreased functional mobility ; Decreased strength;Decreased safe awareness;Decreased endurance;Decreased balance  Assessment: Pt with decreased independent mobility from basline. Pt reports normally independent with mobiltiy.   Currently needing SBA for bed mobiltiy, CGA for transfers, min assist for ambulation without AD and CGA for ambulation with walker. Improved stability with use of walker. Pt limited by CLEANING. Poor safety awareness - needs cues to stop and rest throughout session. At high risk for falls and not safe to ambulate alone. Rec cont skilled PT to maximimze mobility and independence. If pt returns home, rec 24 hr direct assist and home PT. Treatment Diagnosis: impaired functional mobiltiy 2/2 decreased balance and endurance  Decision Making: Medium Complexity  Requires PT Follow-Up: Yes     Plan   Physical Therapy Plan  General Plan:  (2-5)  Current Treatment Recommendations: Strengthening, Balance training, Functional mobility training, Endurance training, Stair training, Gait training, Equipment evaluation, education, & procurement, Patient/Caregiver education & training, Safety education & training  Safety Devices  Type of Devices: Nurse notified, Chair alarm in place, Call light within reach, Left in chair     Restrictions  Position Activity Restriction  Other position/activity restrictions: no restrictions noted     Subjective   General  Chart Reviewed: Yes  Additional Pertinent Hx: Pt is a 61 y.o. female adm 12/2 with COPD. Pt presented with shortness of breath and wheezing. Patient hypoxic and tachycardic on presentation with auditory wheezing. Intubated on admission. Extub 12/7. CXR: neg. MBS:Penetration and aspiration with thin liquid consistency trials as above. PMH:Asthma and COPD, DM, GERD  Referring Practitioner: La Bajwa MD  Referral Date : 12/07/23  Diagnosis: COPD  Subjective  Subjective: Pt found supine. Agreeable to PT. Reports back pain - not rated. RN informed and gave pain med.          Social/Functional History  Social/Functional History  Lives With:  (18 y/o grandson)  Type of Home: House  Home Layout: Able to Live on Main level with bedroom/bathroom, Laundry in basement  Home Access: Stairs to enter with rails  Entrance

## 2023-12-08 NOTE — PROGRESS NOTES
Gradually wean down supplemental oxygen from 9L HFNC to 3L NC. Up to chair, contact guard assist, CLEANING, tolerates ambulation well. Voiding, adequate UOP.

## 2023-12-08 NOTE — CARE COORDINATION
Cm following, met with pt and  bedside, she refused SNF placement but is agreeable to [address], she is calling [employer] to see what company she wanted for [address].  agrees to take pt home, would benefit from PT OT re-eval over the weekend to see if she improves.   Electronically signed by Pablo Ayon RN on 12/8/2023 at 5:11 PM   [phone]

## 2023-12-09 LAB
ANION GAP SERPL CALCULATED.3IONS-SCNC: 7 MMOL/L (ref 3–16)
BASOPHILS # BLD: 0 K/UL (ref 0–0.2)
BASOPHILS NFR BLD: 0.4 %
BILIRUB UR QL STRIP.AUTO: NEGATIVE
BUN SERPL-MCNC: 16 MG/DL (ref 7–20)
CALCIUM SERPL-MCNC: 9.2 MG/DL (ref 8.3–10.6)
CHLORIDE SERPL-SCNC: 99 MMOL/L (ref 99–110)
CLARITY UR: CLEAR
CO2 SERPL-SCNC: 32 MMOL/L (ref 21–32)
COLOR UR: YELLOW
CREAT SERPL-MCNC: 0.5 MG/DL (ref 0.6–1.2)
DEPRECATED RDW RBC AUTO: 17.4 % (ref 12.4–15.4)
EOSINOPHIL # BLD: 0.3 K/UL (ref 0–0.6)
EOSINOPHIL NFR BLD: 3.7 %
GFR SERPLBLD CREATININE-BSD FMLA CKD-EPI: >60 ML/MIN/{1.73_M2}
GLUCOSE BLD-MCNC: 145 MG/DL (ref 70–99)
GLUCOSE BLD-MCNC: 169 MG/DL (ref 70–99)
GLUCOSE BLD-MCNC: 231 MG/DL (ref 70–99)
GLUCOSE SERPL-MCNC: 189 MG/DL (ref 70–99)
GLUCOSE UR STRIP.AUTO-MCNC: NEGATIVE MG/DL
HCT VFR BLD AUTO: 32.8 % (ref 36–48)
HGB BLD-MCNC: 10.7 G/DL (ref 12–16)
HGB UR QL STRIP.AUTO: NEGATIVE
KETONES UR STRIP.AUTO-MCNC: NEGATIVE MG/DL
LEUKOCYTE ESTERASE UR QL STRIP.AUTO: NEGATIVE
LYMPHOCYTES # BLD: 3.7 K/UL (ref 1–5.1)
LYMPHOCYTES NFR BLD: 41.7 %
MAGNESIUM SERPL-MCNC: 1.9 MG/DL (ref 1.8–2.4)
MCH RBC QN AUTO: 24 PG (ref 26–34)
MCHC RBC AUTO-ENTMCNC: 32.7 G/DL (ref 31–36)
MCV RBC AUTO: 73.4 FL (ref 80–100)
MONOCYTES # BLD: 1.1 K/UL (ref 0–1.3)
MONOCYTES NFR BLD: 11.9 %
NEUTROPHILS # BLD: 3.8 K/UL (ref 1.7–7.7)
NEUTROPHILS NFR BLD: 42.3 %
NITRITE UR QL STRIP.AUTO: NEGATIVE
PERFORMED ON: ABNORMAL
PH UR STRIP.AUTO: 6.5 [PH] (ref 5–8)
PLATELET # BLD AUTO: 236 K/UL (ref 135–450)
PMV BLD AUTO: 7.7 FL (ref 5–10.5)
POTASSIUM SERPL-SCNC: 4.2 MMOL/L (ref 3.5–5.1)
PROT UR STRIP.AUTO-MCNC: NEGATIVE MG/DL
RBC # BLD AUTO: 4.47 M/UL (ref 4–5.2)
SODIUM SERPL-SCNC: 138 MMOL/L (ref 136–145)
SP GR UR STRIP.AUTO: 1.02 (ref 1–1.03)
UA COMPLETE W REFLEX CULTURE PNL UR: NORMAL
UA DIPSTICK W REFLEX MICRO PNL UR: NORMAL
URN SPEC COLLECT METH UR: NORMAL
UROBILINOGEN UR STRIP-ACNC: 0.2 E.U./DL
WBC # BLD AUTO: 8.9 K/UL (ref 4–11)

## 2023-12-09 PROCEDURE — 6370000000 HC RX 637 (ALT 250 FOR IP)

## 2023-12-09 PROCEDURE — 85025 COMPLETE CBC W/AUTO DIFF WBC: CPT

## 2023-12-09 PROCEDURE — 81003 URINALYSIS AUTO W/O SCOPE: CPT

## 2023-12-09 PROCEDURE — 94640 AIRWAY INHALATION TREATMENT: CPT

## 2023-12-09 PROCEDURE — 36415 COLL VENOUS BLD VENIPUNCTURE: CPT

## 2023-12-09 PROCEDURE — 2060000000 HC ICU INTERMEDIATE R&B

## 2023-12-09 PROCEDURE — 2580000003 HC RX 258

## 2023-12-09 PROCEDURE — 6360000002 HC RX W HCPCS

## 2023-12-09 PROCEDURE — 80048 BASIC METABOLIC PNL TOTAL CA: CPT

## 2023-12-09 PROCEDURE — 83735 ASSAY OF MAGNESIUM: CPT

## 2023-12-09 PROCEDURE — 94761 N-INVAS EAR/PLS OXIMETRY MLT: CPT

## 2023-12-09 PROCEDURE — 6370000000 HC RX 637 (ALT 250 FOR IP): Performed by: INTERNAL MEDICINE

## 2023-12-09 PROCEDURE — 6360000002 HC RX W HCPCS: Performed by: STUDENT IN AN ORGANIZED HEALTH CARE EDUCATION/TRAINING PROGRAM

## 2023-12-09 PROCEDURE — 2700000000 HC OXYGEN THERAPY PER DAY

## 2023-12-09 RX ORDER — LIDOCAINE 4 G/G
1 PATCH TOPICAL ONCE
Qty: 1 EACH | Refills: 0 | Status: CANCELLED | OUTPATIENT
Start: 2023-12-09 | End: 2023-12-09

## 2023-12-09 RX ORDER — MENTHOL 1.4 %
ADHESIVE PATCH, MEDICATED TOPICAL 2 TIMES DAILY
Qty: 57 G | Refills: 0 | Status: CANCELLED | OUTPATIENT
Start: 2023-12-09

## 2023-12-09 RX ORDER — MENTHOL 1.4 %
ADHESIVE PATCH, MEDICATED TOPICAL 2 TIMES DAILY
Status: DISCONTINUED | OUTPATIENT
Start: 2023-12-09 | End: 2023-12-10

## 2023-12-09 RX ORDER — LIDOCAINE 4 G/G
1 PATCH TOPICAL ONCE
Status: COMPLETED | OUTPATIENT
Start: 2023-12-09 | End: 2023-12-09

## 2023-12-09 RX ADMIN — TRAMADOL HYDROCHLORIDE 50 MG: 50 TABLET, COATED ORAL at 03:52

## 2023-12-09 RX ADMIN — ENOXAPARIN SODIUM 40 MG: 100 INJECTION SUBCUTANEOUS at 11:17

## 2023-12-09 RX ADMIN — MUSCLE RUB CREAM: 100; 150 CREAM TOPICAL at 14:49

## 2023-12-09 RX ADMIN — BUDESONIDE 250 MCG: 0.25 INHALANT RESPIRATORY (INHALATION) at 21:04

## 2023-12-09 RX ADMIN — ARFORMOTEROL TARTRATE 15 MCG: 15 SOLUTION RESPIRATORY (INHALATION) at 21:04

## 2023-12-09 RX ADMIN — INSULIN LISPRO 2 UNITS: 100 INJECTION, SOLUTION INTRAVENOUS; SUBCUTANEOUS at 13:32

## 2023-12-09 RX ADMIN — TRAMADOL HYDROCHLORIDE 50 MG: 50 TABLET, COATED ORAL at 14:49

## 2023-12-09 RX ADMIN — BUDESONIDE 250 MCG: 0.25 INHALANT RESPIRATORY (INHALATION) at 09:09

## 2023-12-09 RX ADMIN — SODIUM CHLORIDE 25 ML: 9 INJECTION, SOLUTION INTRAVENOUS at 13:42

## 2023-12-09 RX ADMIN — CEFTRIAXONE 1000 MG: 1 INJECTION, POWDER, FOR SOLUTION INTRAMUSCULAR; INTRAVENOUS at 13:43

## 2023-12-09 RX ADMIN — SODIUM CHLORIDE, PRESERVATIVE FREE 10 ML: 5 INJECTION INTRAVENOUS at 21:30

## 2023-12-09 RX ADMIN — ARFORMOTEROL TARTRATE 15 MCG: 15 SOLUTION RESPIRATORY (INHALATION) at 09:09

## 2023-12-09 RX ADMIN — IPRATROPIUM BROMIDE AND ALBUTEROL SULFATE 1 DOSE: 2.5; .5 SOLUTION RESPIRATORY (INHALATION) at 09:09

## 2023-12-09 RX ADMIN — IPRATROPIUM BROMIDE AND ALBUTEROL SULFATE 1 DOSE: 2.5; .5 SOLUTION RESPIRATORY (INHALATION) at 21:05

## 2023-12-09 RX ADMIN — DOCUSATE SODIUM 50 MG AND SENNOSIDES 8.6 MG 2 TABLET: 8.6; 5 TABLET, FILM COATED ORAL at 11:15

## 2023-12-09 RX ADMIN — LANSOPRAZOLE 30 MG: 30 TABLET, ORALLY DISINTEGRATING, DELAYED RELEASE ORAL at 11:15

## 2023-12-09 RX ADMIN — SODIUM CHLORIDE, PRESERVATIVE FREE 10 ML: 5 INJECTION INTRAVENOUS at 11:17

## 2023-12-09 ASSESSMENT — PAIN SCALES - GENERAL
PAINLEVEL_OUTOF10: 10
PAINLEVEL_OUTOF10: 0
PAINLEVEL_OUTOF10: 10
PAINLEVEL_OUTOF10: 10
PAINLEVEL_OUTOF10: 0

## 2023-12-09 ASSESSMENT — PAIN DESCRIPTION - FREQUENCY
FREQUENCY: CONTINUOUS

## 2023-12-09 ASSESSMENT — PAIN - FUNCTIONAL ASSESSMENT
PAIN_FUNCTIONAL_ASSESSMENT: PREVENTS OR INTERFERES SOME ACTIVE ACTIVITIES AND ADLS
PAIN_FUNCTIONAL_ASSESSMENT: PREVENTS OR INTERFERES SOME ACTIVE ACTIVITIES AND ADLS
PAIN_FUNCTIONAL_ASSESSMENT: ACTIVITIES ARE NOT PREVENTED

## 2023-12-09 ASSESSMENT — PAIN DESCRIPTION - DESCRIPTORS
DESCRIPTORS: ACHING

## 2023-12-09 ASSESSMENT — PAIN DESCRIPTION - ONSET
ONSET: ON-GOING

## 2023-12-09 ASSESSMENT — PAIN DESCRIPTION - LOCATION
LOCATION: BACK

## 2023-12-09 ASSESSMENT — PAIN DESCRIPTION - PAIN TYPE
TYPE: CHRONIC PAIN

## 2023-12-09 ASSESSMENT — PAIN DESCRIPTION - ORIENTATION
ORIENTATION: LOWER

## 2023-12-09 NOTE — DISCHARGE SUMMARY
INTERNAL MEDICINE DEPARTMENT AT [address] [place] [address]  DISCHARGE SUMMARY    Patient ID: Brennan Rosenthal                                             Discharge Date: 12/11/23   Patient's PCP: No primary care provider on file. Discharge Physician: General Thierno MD  Admit Date: 12/2/2023   Admitting Physician: Elvira Black MD    PROBLEMS DURING HOSPITALIZATION:  Present on Admission:   COPD (chronic obstructive pulmonary disease) ([facility])   COPD with acute exacerbation ([facility])   Acute hypercapnic respiratory failure (HCC)      HPI:  60 yo female pmhx of COPD presenting with SOB and wheezing. Patient hypoxic and tachycardic on presentation with auditory wheezing. Was recently in ED with similar symptoms, d/c'ed on z-pack and prednisone. Patient deteriorated over ED course, going from non-rebreather to [address] [place] [address] [address] 14/8 to 18/8, continuing to have significant SOB following interventions. Notable labs: K 5.4, BNP 1040, Troponin 165. EKG w/o ST elevations, ALT and AST elevated at 156 and 143 respectively, WBC elevated at 12. Admitted to ICU due to high intubation risk, on arrival due to patient WOB and ABGs decision made to intubate. Will continue to monitor. The following issues were addressed during hospitalization: For respiratory failure due to COPD exacerbation and Serratia Marcescens lower respiratory tract infection, she was intubated from 12/2 to 12/7. She received Solu-Medrol for 3d from 12/2-12/4, azithromycin 500 mg daily 12/5-12/6,  Ceftriaxone 12/4-12/6 and then 12/9 -12/11. Sputum culture grew Serratia. Prednisone started 12/10 (total 16d course with 40 mg x 4 days, 30 mg x 4 days, 20 mg x 4 days, and 10 mg x 4 days). She also received DuoNebs PRN, and Pulmicort nebulizer BID. For her anemia, she presented with Hb 13. The hemoglobin downtrended to 10 by the time of discharge w/MCV 72 and high RDW, but Fe studies and NL B9 and b12.  This was most likely

## 2023-12-09 NOTE — DISCHARGE INSTRUCTIONS
Please follow-up with your primary care physician 2 weeks   Please follow up with your pulmonologist in 2 weeks. Please get a sleep study done within 1 month of discharge. Please see cardiopulmonary rehab within 2 weeks. Please continue to take rest of your medications as prescribed. Please contact your primary care physician immediately if you have any symptoms of abdominal pain, fever, or any other new symptoms that needs attention.

## 2023-12-09 NOTE — PROGRESS NOTES
Patient assessed. Remains alert and oriented. Remains on 3 liters NC. Patient has some expiratory wheezes bilaterally. Non-productive, occasional cough. Visitor at bedside. Vitals stable. IVs patent. Pure wick in place. RT in room giving respiratory meds. Patient to transfer to [facility]. Report called.

## 2023-12-09 NOTE — PLAN OF CARE
Problem: Respiratory - Adult  Goal: Achieves optimal ventilation and oxygenation  12/9/2023 0011 by Melissa Manrique RN  Outcome: Progressing  Flowsheets (Taken 12/9/2023 0011)  Achieves optimal ventilation and oxygenation:   Assess for changes in respiratory status   Assess for changes in mentation and behavior   Position to facilitate oxygenation and minimize respiratory effort   Oxygen supplementation based on oxygen saturation or arterial blood gases     Problem: Discharge Planning  Goal: Discharge to home or other facility with appropriate resources  12/9/2023 0011 by Melissa Manrqiue RN  Outcome: Progressing  Flowsheets  Taken 12/9/2023 0011 by Melissa Manrique RN  Discharge to home or other facility with appropriate resources:   Identify barriers to discharge with patient and caregiver   Arrange for needed discharge resources and transportation as appropriate   Identify discharge learning needs (meds, wound care, etc)  Taken 12/8/2023 1945 by Isabel Sun RN  Discharge to home or other facility with appropriate resources:   Identify barriers to discharge with patient and caregiver   Arrange for needed discharge resources and transportation as appropriate   Identify discharge learning needs (meds, wound care, etc)   Refer to discharge planning if patient needs post-hospital services based on physician order or complex needs related to functional status, cognitive ability or social support system     Problem: Safety - Adult  Goal: Free from fall injury  12/9/2023 0011 by Melissa Manrique RN  Outcome: Progressing  Flowsheets  Taken 12/9/2023 0011 by Melissa Manrique RN  Free From Fall Injury: Instruct family/caregiver on patient safety  Taken 12/8/2023 1929 by Isabel Sun RN  Free From Fall Injury: Instruct family/caregiver on patient safety

## 2023-12-09 NOTE — PROGRESS NOTES
Patient transferred to room [phone] with all belongings. Receiving RN in room. Patient updating family by phone.

## 2023-12-09 NOTE — PROGRESS NOTES
Internal Medicine Progress Note    Date: 12/9/2023   Patient: [place] [facility]-SALINE Day: 7      CC: Shortness of Breath       Interval Hx   CM: pt refused SNF placement, but agreeable to home health care     2.5 L L O2 (down rom 4.5-5 L yesterday). Home O2 requirement is 0 L. VTLs WNL   Doing well, breathing improved, sputum production reduced, cough improved do, able to talk extensively without resp distress   FX - wheezing most prominent in upper air fields     BMP WNL, CBC WBC normalized from 12s yesterday to 9s today. NL Hb 10.7 (stable 10-11 for past few days) w/MCV 73.4 & high RDW 17.4  (Fe studies 12/3 not indicative of Fe deficiency anemia/B9 NL/B12 elevated)    Resp cx serratia     Supposedly patient told RN that she uses BiPAP at home, but told overnight residents that she doesn't. Cannot find sleep studies done in the chart (just ordered studies that were never completed)    Low back pain - Tramadol, Lidocaine patch, & Tylenol PRN already ordered. Avoiding opioids for resp depression in context of COPD exacerbation and recent intubation. Avoiding NSAIDs as patient has had trouble with acid reflux in the past. Ordering Bengay and heat to the area. HPI: 60 yo female pmhx of COPD presenting with SOB and wheezing. Patient hypoxic and tachycardic on presentation with auditory wheezing. Was recently in ED with similar symptoms, d/c'ed on z-pack and prednisone. Patient deteriorated over ED course, going from non-rebreather to [address] [place] [address] Road 14/8 to 18/8, continuing to have significant SOB following interventions. Notable labs: K 5.4, BNP 1040, Troponin 165. EKG w/o ST elevations, ALT and AST elevated at 156 and 143 respectively, WBC elevated at 12. Admitted to ICU due to high intubation risk, on arrival due to patient WOB and ABGs decision made to intubate. Will continue to monitor.       Objective     Vital Signs:  Patient Vitals for the past 8 hrs:   BP Temp Temp src Pulse Resp SpO2 Weight

## 2023-12-09 NOTE — PROGRESS NOTES
Severino CHIU regarding if pt needs nightly bipap. Bipap ordered however pt says she does not use any bipap or O2 at home.

## 2023-12-09 NOTE — PROGRESS NOTES
4 Eyes Skin Assessment     NAME:  Lawanda Norris  YOB: 1960  MEDICAL RECORD NUMBER:  [id number]    The patient is being assessed for  Transfer to New Unit    I agree that at least one RN has performed a thorough Head to Toe Skin Assessment on the patient. ALL assessment sites listed below have been assessed. Areas assessed by both nurses:    Head, Face, Ears, Shoulders, Back, Chest, Arms, Elbows, Hands, Sacrum. Buttock, Coccyx, Ischium, Legs. Feet and Heels, and Under Medical Devices         Does the Patient have a Wound?  No noted wound(s)       Severino Prevention initiated by RN: No  Wound Care Orders initiated by RN: No    Pressure Injury (Stage 3,4, Unstageable, DTI, NWPT, and Complex wounds) if present, place Wound referral order by RN under : No    New Ostomies, if present place, Ostomy referral order under : No     Nurse 1 eSignature: Electronically signed by Crista Warner RN on 12/8/23 at 9:28 PM EST    **SHARE this note so that the co-signing nurse can place an eSignature**    Nurse 2 eSignature: Electronically signed by Gloria Srivastava RN on 12/8/23 at 9:46 PM EST

## 2023-12-10 LAB
ANION GAP SERPL CALCULATED.3IONS-SCNC: 5 MMOL/L (ref 3–16)
BASOPHILS # BLD: 0 K/UL (ref 0–0.2)
BASOPHILS NFR BLD: 0.5 %
BUN SERPL-MCNC: 13 MG/DL (ref 7–20)
CALCIUM SERPL-MCNC: 9 MG/DL (ref 8.3–10.6)
CHLORIDE SERPL-SCNC: 99 MMOL/L (ref 99–110)
CO2 SERPL-SCNC: 34 MMOL/L (ref 21–32)
CREAT SERPL-MCNC: 0.7 MG/DL (ref 0.6–1.2)
DEPRECATED RDW RBC AUTO: 17.9 % (ref 12.4–15.4)
EOSINOPHIL # BLD: 0.3 K/UL (ref 0–0.6)
EOSINOPHIL NFR BLD: 3 %
GFR SERPLBLD CREATININE-BSD FMLA CKD-EPI: >60 ML/MIN/{1.73_M2}
GLUCOSE BLD-MCNC: 160 MG/DL (ref 70–99)
GLUCOSE BLD-MCNC: 187 MG/DL (ref 70–99)
GLUCOSE BLD-MCNC: 194 MG/DL (ref 70–99)
GLUCOSE BLD-MCNC: 285 MG/DL (ref 70–99)
GLUCOSE BLD-MCNC: 302 MG/DL (ref 70–99)
GLUCOSE BLD-MCNC: 572 MG/DL (ref 70–99)
GLUCOSE SERPL-MCNC: 152 MG/DL (ref 70–99)
HCT VFR BLD AUTO: 33.1 % (ref 36–48)
HGB BLD-MCNC: 10.7 G/DL (ref 12–16)
LYMPHOCYTES # BLD: 2.9 K/UL (ref 1–5.1)
LYMPHOCYTES NFR BLD: 32.2 %
MAGNESIUM SERPL-MCNC: 1.9 MG/DL (ref 1.8–2.4)
MCH RBC QN AUTO: 23.8 PG (ref 26–34)
MCHC RBC AUTO-ENTMCNC: 32.3 G/DL (ref 31–36)
MCV RBC AUTO: 73.6 FL (ref 80–100)
MONOCYTES # BLD: 1.1 K/UL (ref 0–1.3)
MONOCYTES NFR BLD: 12.1 %
NEUTROPHILS # BLD: 4.8 K/UL (ref 1.7–7.7)
NEUTROPHILS NFR BLD: 52.2 %
PERFORMED ON: ABNORMAL
PLATELET # BLD AUTO: 234 K/UL (ref 135–450)
PMV BLD AUTO: 7.9 FL (ref 5–10.5)
POTASSIUM SERPL-SCNC: 4.2 MMOL/L (ref 3.5–5.1)
RBC # BLD AUTO: 4.49 M/UL (ref 4–5.2)
SODIUM SERPL-SCNC: 138 MMOL/L (ref 136–145)
WBC # BLD AUTO: 9.1 K/UL (ref 4–11)

## 2023-12-10 PROCEDURE — 80048 BASIC METABOLIC PNL TOTAL CA: CPT

## 2023-12-10 PROCEDURE — 6370000000 HC RX 637 (ALT 250 FOR IP): Performed by: INTERNAL MEDICINE

## 2023-12-10 PROCEDURE — 6360000002 HC RX W HCPCS

## 2023-12-10 PROCEDURE — 2700000000 HC OXYGEN THERAPY PER DAY

## 2023-12-10 PROCEDURE — 97530 THERAPEUTIC ACTIVITIES: CPT

## 2023-12-10 PROCEDURE — 6370000000 HC RX 637 (ALT 250 FOR IP)

## 2023-12-10 PROCEDURE — 85025 COMPLETE CBC W/AUTO DIFF WBC: CPT

## 2023-12-10 PROCEDURE — 6360000002 HC RX W HCPCS: Performed by: STUDENT IN AN ORGANIZED HEALTH CARE EDUCATION/TRAINING PROGRAM

## 2023-12-10 PROCEDURE — 2580000003 HC RX 258

## 2023-12-10 PROCEDURE — 97116 GAIT TRAINING THERAPY: CPT

## 2023-12-10 PROCEDURE — 2060000000 HC ICU INTERMEDIATE R&B

## 2023-12-10 PROCEDURE — 94761 N-INVAS EAR/PLS OXIMETRY MLT: CPT

## 2023-12-10 PROCEDURE — 36415 COLL VENOUS BLD VENIPUNCTURE: CPT

## 2023-12-10 PROCEDURE — 83735 ASSAY OF MAGNESIUM: CPT

## 2023-12-10 PROCEDURE — 94640 AIRWAY INHALATION TREATMENT: CPT

## 2023-12-10 RX ORDER — PREDNISONE 10 MG/1
TABLET ORAL
Qty: 40 TABLET | Refills: 0 | Status: CANCELLED | OUTPATIENT
Start: 2023-12-10 | End: 2023-12-26

## 2023-12-10 RX ORDER — CODEINE PHOSPHATE AND GUAIFENESIN 10; 100 MG/5ML; MG/5ML
5 SOLUTION ORAL EVERY 8 HOURS PRN
Status: DISCONTINUED | OUTPATIENT
Start: 2023-12-10 | End: 2023-12-11 | Stop reason: HOSPADM

## 2023-12-10 RX ORDER — MENTHOL 1.4 %
ADHESIVE PATCH, MEDICATED TOPICAL 3 TIMES DAILY
Status: DISCONTINUED | OUTPATIENT
Start: 2023-12-10 | End: 2023-12-11 | Stop reason: HOSPADM

## 2023-12-10 RX ORDER — CLOTRIMAZOLE 1 %
CREAM (GRAM) TOPICAL 3 TIMES DAILY
Status: DISCONTINUED | OUTPATIENT
Start: 2023-12-10 | End: 2023-12-10 | Stop reason: CLARIF

## 2023-12-10 RX ORDER — LIDOCAINE 4 G/G
1 PATCH TOPICAL ONCE
Status: COMPLETED | OUTPATIENT
Start: 2023-12-10 | End: 2023-12-11

## 2023-12-10 RX ORDER — CALCIUM CARBONATE 500 MG/1
500 TABLET, CHEWABLE ORAL 3 TIMES DAILY
Status: DISCONTINUED | OUTPATIENT
Start: 2023-12-10 | End: 2023-12-11 | Stop reason: HOSPADM

## 2023-12-10 RX ORDER — PREDNISONE 20 MG/1
40 TABLET ORAL DAILY
Status: DISCONTINUED | OUTPATIENT
Start: 2023-12-10 | End: 2023-12-11 | Stop reason: HOSPADM

## 2023-12-10 RX ADMIN — MUSCLE RUB CREAM: 100; 150 CREAM TOPICAL at 02:09

## 2023-12-10 RX ADMIN — ARFORMOTEROL TARTRATE 15 MCG: 15 SOLUTION RESPIRATORY (INHALATION) at 21:39

## 2023-12-10 RX ADMIN — BENZOCAINE 6 MG-MENTHOL 10 MG LOZENGES 1 LOZENGE: at 21:34

## 2023-12-10 RX ADMIN — BUDESONIDE 250 MCG: 0.25 INHALANT RESPIRATORY (INHALATION) at 21:39

## 2023-12-10 RX ADMIN — TRAMADOL HYDROCHLORIDE 50 MG: 50 TABLET, COATED ORAL at 03:23

## 2023-12-10 RX ADMIN — CEFTRIAXONE 1000 MG: 1 INJECTION, POWDER, FOR SOLUTION INTRAMUSCULAR; INTRAVENOUS at 14:51

## 2023-12-10 RX ADMIN — ANTACID TABLETS 500 MG: 500 TABLET, CHEWABLE ORAL at 21:34

## 2023-12-10 RX ADMIN — SODIUM CHLORIDE, PRESERVATIVE FREE 10 ML: 5 INJECTION INTRAVENOUS at 21:35

## 2023-12-10 RX ADMIN — DOCUSATE SODIUM 50 MG AND SENNOSIDES 8.6 MG 2 TABLET: 8.6; 5 TABLET, FILM COATED ORAL at 14:34

## 2023-12-10 RX ADMIN — ANTACID TABLETS 500 MG: 500 TABLET, CHEWABLE ORAL at 14:33

## 2023-12-10 RX ADMIN — MUSCLE RUB CREAM: 100; 150 CREAM TOPICAL at 21:39

## 2023-12-10 RX ADMIN — INSULIN LISPRO 4 UNITS: 100 INJECTION, SOLUTION INTRAVENOUS; SUBCUTANEOUS at 21:34

## 2023-12-10 RX ADMIN — PREDNISONE 40 MG: 20 TABLET ORAL at 14:34

## 2023-12-10 RX ADMIN — ENOXAPARIN SODIUM 40 MG: 100 INJECTION SUBCUTANEOUS at 12:13

## 2023-12-10 RX ADMIN — MICONAZOLE NITRATE: 20 CREAM TOPICAL at 21:39

## 2023-12-10 RX ADMIN — SODIUM CHLORIDE 25 ML: 9 INJECTION, SOLUTION INTRAVENOUS at 14:48

## 2023-12-10 RX ADMIN — SODIUM CHLORIDE, PRESERVATIVE FREE 10 ML: 5 INJECTION INTRAVENOUS at 12:00

## 2023-12-10 RX ADMIN — INSULIN LISPRO 4 UNITS: 100 INJECTION, SOLUTION INTRAVENOUS; SUBCUTANEOUS at 18:35

## 2023-12-10 RX ADMIN — MICONAZOLE NITRATE: 20 CREAM TOPICAL at 11:56

## 2023-12-10 RX ADMIN — MUSCLE RUB CREAM: 100; 150 CREAM TOPICAL at 12:01

## 2023-12-10 RX ADMIN — LANSOPRAZOLE 30 MG: 30 TABLET, ORALLY DISINTEGRATING, DELAYED RELEASE ORAL at 14:34

## 2023-12-10 RX ADMIN — BENZOCAINE 6 MG-MENTHOL 10 MG LOZENGES 1 LOZENGE: at 14:33

## 2023-12-10 RX ADMIN — IPRATROPIUM BROMIDE AND ALBUTEROL SULFATE 1 DOSE: 2.5; .5 SOLUTION RESPIRATORY (INHALATION) at 17:28

## 2023-12-10 RX ADMIN — MUSCLE RUB CREAM: 100; 150 CREAM TOPICAL at 14:39

## 2023-12-10 RX ADMIN — IPRATROPIUM BROMIDE AND ALBUTEROL SULFATE 1 DOSE: 2.5; .5 SOLUTION RESPIRATORY (INHALATION) at 13:07

## 2023-12-10 RX ADMIN — IPRATROPIUM BROMIDE AND ALBUTEROL SULFATE 1 DOSE: 2.5; .5 SOLUTION RESPIRATORY (INHALATION) at 21:40

## 2023-12-10 ASSESSMENT — PAIN DESCRIPTION - LOCATION: LOCATION: HEAD

## 2023-12-10 ASSESSMENT — PAIN SCALES - GENERAL
PAINLEVEL_OUTOF10: 0
PAINLEVEL_OUTOF10: 10

## 2023-12-10 ASSESSMENT — PAIN DESCRIPTION - DESCRIPTORS: DESCRIPTORS: ACHING;HEAVINESS

## 2023-12-10 ASSESSMENT — PAIN - FUNCTIONAL ASSESSMENT: PAIN_FUNCTIONAL_ASSESSMENT: ACTIVITIES ARE NOT PREVENTED

## 2023-12-10 ASSESSMENT — PAIN DESCRIPTION - ORIENTATION: ORIENTATION: INNER

## 2023-12-10 NOTE — PROGRESS NOTES
Patient complains of irritation around ines area, states it is a yeast infection. RN notifies MD, MD orders UA and wet mount.

## 2023-12-10 NOTE — PROGRESS NOTES
Occupational Therapy  Facility/Department: [place] [address] [facility]  Daily Treatment Note  NAME: Manuela Howard  : 1960  MRN: [id number]    Date of Service: 12/10/2023    Manuela Howard scored a 20/24 on the AM-PAC ADL Inpatient form. Current research shows that an AM-PAC score of 18 or greater is typically associated with a discharge to the patient's home setting. Based on the patient's AM-PAC score, and their current ADL deficits, it is recommended that the patient have 2-3 sessions per week of Occupational Therapy at d/c to increase the patient's independence. At this time, this patient demonstrates the endurance and safety to discharge home with initial 24hr (home vs OP services) and a follow up treatment frequency of 2-3x/wk. Please see assessment section for further patient specific details. If patient discharges prior to next session this note will serve as a discharge summary. Please see below for the latest assessment towards goals. Discharge Recommendations:  24 hour supervision or assist  OT Equipment Recommendations  Equipment Needed: Yes  Other: shower chair, RW      Patient Diagnosis(es): The primary encounter diagnosis was COPD exacerbation ([address]). Diagnoses of Hypoxia and Elevated troponin were also pertinent to this visit. Assessment    Assessment: Pt is 61y F from home w/ grandson, IND at baseline w/ no AD. Pt presently on 2L O2 via nasal-canula and using RW at [facility]. Pt reporting sister is going to be staying w/ her at d/c and declining [address] Pt may benefit from cont'd skilled OT while inpt to maximize safety, IND, and fxl act miguel for ADL and fxl mobilty. Will cont to follow per POC. Activity Tolerance: Patient limited by endurance (Pt on 2L O2 on arrival to room. Pt removed nasal canula to blow nose, and on replacement of canula at 87%, then recovered to 91%. While walking pt 90% at 1/2 way point, and upon retn to room pt at 85%, recovered to 90. RT in room on OT/PT exit.  RN

## 2023-12-10 NOTE — PROGRESS NOTES
Patient complains of pain, rating 10/10 in lower back. No PRN available, notified MD. MD orders Tylenol, bengay cream, and applied heat to area.

## 2023-12-10 NOTE — PLAN OF CARE
Problem: Respiratory - Adult  Goal: Achieves optimal ventilation and oxygenation  Outcome: Progressing     Problem: Cardiovascular - Adult  Goal: Maintains optimal cardiac output and hemodynamic stability  Outcome: Progressing     Problem: Skin/Tissue Integrity - Adult  Goal: Oral mucous membranes remain intact  Outcome: Progressing     Problem: Discharge Planning  Goal: Discharge to home or other facility with appropriate resources  Outcome: Progressing     Problem: Pain  Goal: Verbalizes/displays adequate comfort level or baseline comfort level  Outcome: Progressing     Problem: Safety - Adult  Goal: Free from fall injury  Outcome: Progressing     Problem: Skin/Tissue Integrity  Goal: Absence of new skin breakdown  Description: 1. Monitor for areas of redness and/or skin breakdown  2. Assess vascular access sites hourly  3. Every 4-6 hours minimum:  Change oxygen saturation probe site  4. Every 4-6 hours:  If on nasal continuous positive airway pressure, respiratory therapy assess nares and determine need for appliance change or resting period.   Outcome: Progressing     Problem: ABCDS Injury Assessment  Goal: Absence of physical injury  Outcome: Progressing     Problem: Chronic Conditions and Co-morbidities  Goal: Patient's chronic conditions and co-morbidity symptoms are monitored and maintained or improved  Outcome: Progressing     Problem: Nutrition Deficit:  Goal: Optimize nutritional status  Outcome: Progressing

## 2023-12-10 NOTE — PROGRESS NOTES
Physical Therapy  Facility/Department: [place] [address] [facility]  Daily Treatment Note  NAME: Jennifer Abrams  : 1960  MRN: [id number]    Date of Service: 12/10/2023    Discharge Recommendations:  Jennifer Abrams scored a 18/24 on the AM-PAC short mobility form. Current research shows that an AM-PAC score of 18 or greater is typically associated with a discharge to the patient's home setting. Based on the patient's AM-PAC score and their current functional mobility deficits, it is recommended that the patient have 2-3 sessions per week of Physical Therapy at d/c to increase the patient's independence. At this time, this patient demonstrates the endurance and safety to discharge home with home PT and a follow up treatment frequency of 2-3x/wk. Please see assessment section for further patient specific details. PT Equipment Recommendations  Equipment Needed: Yes  Mobility Devices: Morris Filter: Rolling    Patient Diagnosis(es): The primary encounter diagnosis was COPD exacerbation ([address] Diagnoses of Hypoxia and Elevated troponin were also pertinent to this visit. Assessment   Assessment: Patient tolerated session well, transferring and ambulating in room and hallways as above with fairly steady gait using FWW. Patient on 2L O2 throughout session but shows desaturation to 87% when removed to blow her nose. With ambulation, O2 dropped from 97% at rest to 90% after 80' of ambulation and then 85% after remaining 80' of ambulation. patient requires seated rest break and cues for breathing techniques to return to 93%. Patient reports plan to d/c home with assistance from daughter, denies concerns regarding mobility upon discharge and declining need for home PT. She does not have a FWW and will need one prior to discharge. Will continue to follow while in acute care setting to maximize independence with mobility. Activity Tolerance: Patient limited by endurance; Patient tolerated treatment well  Equipment

## 2023-12-10 NOTE — PROGRESS NOTES
RT Evaluation for Home Oxygen    Resting (Room Air):  SpO2: 80    Resting (On O2):  SpO2:  94  O2 Device: Nasal cannula  O2 Flow Rate (L/min): 2 l/min       During Walk (On O2):  SpO2: 92  O2 Device: Nasal cannula  O2 Flow Rate (L/min): 3 l/min    O2 Flow Rate: 85 l/min  O2 Saturation: 2    Rate of Dyspnea: Regular pattern RR 12-20    Does the Patient Qualify for Home O2: Yes  Liter Flow at Rest: 2  Liter Flow on Exertion: 3  Does the Patient Need Portable Oxygen Tanks:  Yes      Additional Comments:     Electronically signed by Zoila King RCP on 12/10/2023 at 2:29 PM

## 2023-12-11 VITALS
OXYGEN SATURATION: 93 % | HEART RATE: 70 BPM | TEMPERATURE: 97.6 F | DIASTOLIC BLOOD PRESSURE: 89 MMHG | SYSTOLIC BLOOD PRESSURE: 131 MMHG | RESPIRATION RATE: 18 BRPM | WEIGHT: 145.06 LBS | HEIGHT: 61 IN | BODY MASS INDEX: 27.39 KG/M2

## 2023-12-11 LAB
ANION GAP SERPL CALCULATED.3IONS-SCNC: 6 MMOL/L (ref 3–16)
BASOPHILS # BLD: 0 K/UL (ref 0–0.2)
BASOPHILS NFR BLD: 0.2 %
BUN SERPL-MCNC: 12 MG/DL (ref 7–20)
CALCIUM SERPL-MCNC: 8.9 MG/DL (ref 8.3–10.6)
CHLORIDE SERPL-SCNC: 97 MMOL/L (ref 99–110)
CO2 SERPL-SCNC: 31 MMOL/L (ref 21–32)
CREAT SERPL-MCNC: <0.5 MG/DL (ref 0.6–1.2)
DEPRECATED RDW RBC AUTO: 17.5 % (ref 12.4–15.4)
EOSINOPHIL # BLD: 0 K/UL (ref 0–0.6)
EOSINOPHIL NFR BLD: 0.4 %
GFR SERPLBLD CREATININE-BSD FMLA CKD-EPI: >60 ML/MIN/{1.73_M2}
GLUCOSE BLD-MCNC: 128 MG/DL (ref 70–99)
GLUCOSE BLD-MCNC: 203 MG/DL (ref 70–99)
GLUCOSE SERPL-MCNC: 143 MG/DL (ref 70–99)
HCT VFR BLD AUTO: 30.9 % (ref 36–48)
HGB BLD-MCNC: 10.1 G/DL (ref 12–16)
LYMPHOCYTES # BLD: 1.7 K/UL (ref 1–5.1)
LYMPHOCYTES NFR BLD: 15.1 %
MAGNESIUM SERPL-MCNC: 2 MG/DL (ref 1.8–2.4)
MCH RBC QN AUTO: 23.8 PG (ref 26–34)
MCHC RBC AUTO-ENTMCNC: 32.7 G/DL (ref 31–36)
MCV RBC AUTO: 72.8 FL (ref 80–100)
MONOCYTES # BLD: 1.4 K/UL (ref 0–1.3)
MONOCYTES NFR BLD: 12.4 %
NEUTROPHILS # BLD: 8.2 K/UL (ref 1.7–7.7)
NEUTROPHILS NFR BLD: 71.9 %
PERFORMED ON: ABNORMAL
PERFORMED ON: ABNORMAL
PLATELET # BLD AUTO: 263 K/UL (ref 135–450)
PMV BLD AUTO: 8 FL (ref 5–10.5)
POTASSIUM SERPL-SCNC: 4.4 MMOL/L (ref 3.5–5.1)
RBC # BLD AUTO: 4.24 M/UL (ref 4–5.2)
SODIUM SERPL-SCNC: 134 MMOL/L (ref 136–145)
WBC # BLD AUTO: 11.4 K/UL (ref 4–11)

## 2023-12-11 PROCEDURE — 92526 ORAL FUNCTION THERAPY: CPT

## 2023-12-11 PROCEDURE — 2580000003 HC RX 258

## 2023-12-11 PROCEDURE — 6370000000 HC RX 637 (ALT 250 FOR IP): Performed by: INTERNAL MEDICINE

## 2023-12-11 PROCEDURE — 36415 COLL VENOUS BLD VENIPUNCTURE: CPT

## 2023-12-11 PROCEDURE — 6360000002 HC RX W HCPCS

## 2023-12-11 PROCEDURE — 6370000000 HC RX 637 (ALT 250 FOR IP)

## 2023-12-11 PROCEDURE — 83735 ASSAY OF MAGNESIUM: CPT

## 2023-12-11 PROCEDURE — 85025 COMPLETE CBC W/AUTO DIFF WBC: CPT

## 2023-12-11 PROCEDURE — 6360000002 HC RX W HCPCS: Performed by: STUDENT IN AN ORGANIZED HEALTH CARE EDUCATION/TRAINING PROGRAM

## 2023-12-11 PROCEDURE — 80048 BASIC METABOLIC PNL TOTAL CA: CPT

## 2023-12-11 RX ORDER — MENTHOL 1.4 %
ADHESIVE PATCH, MEDICATED TOPICAL 3 TIMES DAILY
Qty: 57 G | Refills: 0 | Status: SHIPPED | OUTPATIENT
Start: 2023-12-11

## 2023-12-11 RX ORDER — LIDOCAINE 50 MG/G
1 PATCH TOPICAL DAILY
Qty: 10 PATCH | Refills: 0 | Status: SHIPPED | OUTPATIENT
Start: 2023-12-11 | End: 2023-12-21

## 2023-12-11 RX ORDER — TRAMADOL HYDROCHLORIDE 50 MG/1
50 TABLET ORAL DAILY PRN
Qty: 5 TABLET | Refills: 0 | Status: SHIPPED | OUTPATIENT
Start: 2023-12-11 | End: 2023-12-16

## 2023-12-11 RX ORDER — PREDNISONE 20 MG/1
TABLET ORAL
Qty: 16 TABLET | Refills: 0 | Status: SHIPPED | OUTPATIENT
Start: 2023-12-12 | End: 2023-12-26

## 2023-12-11 RX ORDER — CODEINE PHOSPHATE AND GUAIFENESIN 10; 100 MG/5ML; MG/5ML
5 SOLUTION ORAL 2 TIMES DAILY PRN
Qty: 15 ML | Refills: 0 | Status: SHIPPED | OUTPATIENT
Start: 2023-12-11 | End: 2023-12-14

## 2023-12-11 RX ORDER — CALCIUM CARBONATE 500 MG/1
500 TABLET, CHEWABLE ORAL 3 TIMES DAILY
Qty: 90 TABLET | Refills: 0 | Status: SHIPPED | OUTPATIENT
Start: 2023-12-11 | End: 2024-01-10

## 2023-12-11 RX ADMIN — ENOXAPARIN SODIUM 40 MG: 100 INJECTION SUBCUTANEOUS at 08:31

## 2023-12-11 RX ADMIN — BUDESONIDE 250 MCG: 0.25 INHALANT RESPIRATORY (INHALATION) at 10:06

## 2023-12-11 RX ADMIN — TRAMADOL HYDROCHLORIDE 50 MG: 50 TABLET, COATED ORAL at 04:32

## 2023-12-11 RX ADMIN — BENZOCAINE 6 MG-MENTHOL 10 MG LOZENGES 1 LOZENGE: at 04:32

## 2023-12-11 RX ADMIN — PREDNISONE 40 MG: 20 TABLET ORAL at 08:31

## 2023-12-11 RX ADMIN — SODIUM CHLORIDE, PRESERVATIVE FREE 10 ML: 5 INJECTION INTRAVENOUS at 08:31

## 2023-12-11 RX ADMIN — IPRATROPIUM BROMIDE AND ALBUTEROL SULFATE 1 DOSE: 2.5; .5 SOLUTION RESPIRATORY (INHALATION) at 10:06

## 2023-12-11 RX ADMIN — LANSOPRAZOLE 30 MG: 30 TABLET, ORALLY DISINTEGRATING, DELAYED RELEASE ORAL at 08:31

## 2023-12-11 RX ADMIN — MUSCLE RUB CREAM: 100; 150 CREAM TOPICAL at 08:32

## 2023-12-11 RX ADMIN — CEFTRIAXONE 1000 MG: 1 INJECTION, POWDER, FOR SOLUTION INTRAMUSCULAR; INTRAVENOUS at 11:35

## 2023-12-11 RX ADMIN — ANTACID TABLETS 500 MG: 500 TABLET, CHEWABLE ORAL at 08:31

## 2023-12-11 RX ADMIN — ARFORMOTEROL TARTRATE 15 MCG: 15 SOLUTION RESPIRATORY (INHALATION) at 10:06

## 2023-12-11 RX ADMIN — MICONAZOLE NITRATE: 20 CREAM TOPICAL at 08:32

## 2023-12-11 RX ADMIN — BENZOCAINE 6 MG-MENTHOL 10 MG LOZENGES 1 LOZENGE: at 08:31

## 2023-12-11 NOTE — PROGRESS NOTES
RN notified MD regarding fungal tests and wet prep test. RN tells MD that it is out of scope of practice to collect samples for these tests, MD aware.

## 2023-12-11 NOTE — PROGRESS NOTES
RN notified MD, patient believes that she does not need to be on thickened liquids. RN explains to patient that the last time speech did an evaluation, they thought thickened liquids would be the safest option for her. RN asks MD for a speech evaluation for the patient.  New orders placed by MD.

## 2023-12-11 NOTE — PROGRESS NOTES
location of the gastroesophageal junction. Endotracheal tube in appropriate position. Date of onset: 12/2/2023    Current Diet:  ADULT DIET; Dysphagia - Soft and Bite Sized; Mildly Thick (Nectar)    Treatment Diagnosis: dysphagia    Pain:  Denied pain     General:  Chart reviewed: Yes  Behavior/Cognition: awake, alert, pleasant, cooperative  Communication Observation: verbal  Follows Directions: yes  Dentition/Oral Mucosa: intact, clear  Oral motor exam: WFL   Vocal quality: hoarse  Respiratory Status/oxygen requirements: O2 via 9L HFNC  Vision/Hearing: adequate  Patient Complaint: hungry and thirsty  Patient Positioning: upright in bed  PLOF: from home with family, reportedly on regular diet/thin liquids    Prior Dysphagia History:   None found in chart review    Bedside Swallowing Evaluation Impression 12/7/2023  Concern for possible pharyngeal dysphagia, recommend further assessment via instrumental. Bedside, pt with dysphonic (hoarse) voice s/p recent extubation (intubated x 5 days). Assessed thins via tsp/cup/straw, puree, soft solid. Pt with positive oral acceptance, slowed oral prep, positive swallow movement, good oral clearance. Completed 3 oz screen w/o s/s aspiration, however coughing noted for solids. Recommended MBS to further assess prior to diet placement. Pt agreeable. Instrumental Assessment MBS: 12/7/23  Oral Phase  Mild dysfunction characterized by prolonged mastication of regular solid. Appropriate oral acceptance and clearance, no significant stasis. Pharyngeal Phase  Mild-moderate dysfunction characterized by impaired timing and strength, with delayed swallow onset, delayed and reduced hyolaryngeal elevation and epiglottic retraction. Resulted in deep penetration and tracheal aspiration of thin liquids; reactive cough present, but unable to clear.  Chin tuck initially appeared to prevent, but aspiration occurred on second trial. Improved timing and bolus control noted for mildly thick aspiration or associated decline in respiratory status. 12/8: Pt seated upright in chair and expressed tolerance of evening meal yesterday. Pt prompted to completed >25 effortful swallows with mildly thick liquids (declined trialing solids this date, despite AM meal tray present). Pt appeared to swallow. X2 instances of coughing after completion of swallow, however pt did not endorse any globus sensation. Pt implemented well given min, fading to MI. Pt with reduced recall of rationale for thickened liquids, however agreeable after discussion. Pt additionally completed x2 sets x10 CTAR. Encouraged pt to complete additional sets during day. Strategies and diet level written on whiteboard for increased recall. Cont. 12/11-goal met- RN reported no concerns re: swallowing and that pt is c/o thickened liquids/being non-compliant with recommendations for nectar thick liquids. Currently vocal quality is [place]. Pt reports minor discomfort/tenderness with swallowing at times. Pt analyzed with trials with ice chips, water by cup and straw and cracker. Mastication with ice chips and cracker were functional with no anterior spillage or oral residue. Pt with no s/s aspiration with any consistency, even when challenged by consuming 3oz of water continuously. Recommend upgrading diet to regular with thin liquids. Total treatment time: 15 minutes dysphagia treatment     Plan:Pt discharged from [address] [place] [address]. Pt met all goals for therapy. Recommended diet: upgrade diet to regular with thin liquids/ meds with water   - upright position  - small bites / sips  - slow rate  *encourage oral hygiene 2 x daily*    Discharge Recommendation: No further follow up needed unless s/s aspiration emerge, make pt NPO and re-refer.    Discussed with RNAndie   Needs met prior to leaving room, call light within reach      [place], [place], [address]  Speech-Language Pathologist  Pager  [phone]      This document will

## 2023-12-11 NOTE — PLAN OF CARE
Problem: Respiratory - Adult  Goal: Achieves optimal ventilation and oxygenation  12/11/2023 0321 by Otf Peace RN  Outcome: Progressing     Problem: Cardiovascular - Adult  Goal: Maintains optimal cardiac output and hemodynamic stability  12/11/2023 0321 by Otf Peace RN  Outcome: Progressing     Problem: Skin/Tissue Integrity - Adult  Goal: Oral mucous membranes remain intact  12/11/2023 0321 by Otf Peace RN  Outcome: Progressing     Problem: Discharge Planning  Goal: Discharge to home or other facility with appropriate resources  12/11/2023 0321 by Otf Peace RN  Outcome: Progressing     Problem: Pain  Goal: Verbalizes/displays adequate comfort level or baseline comfort level  12/11/2023 0321 by Otf Peace RN  Outcome: Progressing     Problem: Safety - Adult  Goal: Free from fall injury  12/11/2023 0321 by Otf Peace RN  Outcome: Progressing     Problem: Skin/Tissue Integrity  Goal: Absence of new skin breakdown  Description: 1. Monitor for areas of redness and/or skin breakdown  2. Assess vascular access sites hourly  3. Every 4-6 hours minimum:  Change oxygen saturation probe site  4. Every 4-6 hours:  If on nasal continuous positive airway pressure, respiratory therapy assess nares and determine need for appliance change or resting period.   12/11/2023 0321 by Otf Peace RN  Outcome: Progressing     Problem: ABCDS Injury Assessment  Goal: Absence of physical injury  12/11/2023 0321 by Otf Peace RN  Outcome: Progressing     Problem: Chronic Conditions and Co-morbidities  Goal: Patient's chronic conditions and co-morbidity symptoms are monitored and maintained or improved  12/11/2023 0321 by Otf Peace RN  Outcome: Progressing     Problem: Nutrition Deficit:  Goal: Optimize nutritional status  12/11/2023 0321 by Otf Peace RN  Outcome: Progressing

## 2023-12-11 NOTE — CARE COORDINATION
CTN contacted Kendra at [facility]. They have accepted this referral for [facility] by 12/13. Referral emailed to [email or web address]  Electronically signed by Bimal Nair LPN on 73/12/3875 at 11:48 AM

## 2023-12-11 NOTE — PROGRESS NOTES
Pt is refusing telemetry monitoring. Nurse has provided education about why she is on the monitor and the importance of us being able to monitor the cardiac rhythm. Pt states \"what is the difference, I am going home tomorrow anyway. I don't want this thing on me, it itches. \". Pt is A&Ox4 and verbalizes understanding of  the situation but will continue to decline Tele monitoring.

## 2023-12-11 NOTE — PROGRESS NOTES
Tele removed. IV removed. AVS reviewed with pt and all questions answered. Pt sent with o2, walker and medications.

## 2023-12-11 NOTE — CARE COORDINATION
Case Management Assessment            Discharge Note                    Date / Time of Note: 12/11/2023 11:13 AM                  Discharge Note Completed by: Lavaughn Gitelman    Patient Name: Manuela Howard   YOB: 1960  Diagnosis: COPD (chronic obstructive pulmonary disease) ([facility]) [J44.9]  Hypoxia [R09.02]  Elevated troponin [R79.89]  COPD exacerbation ([facility]) [J44.1]   Date / Time: 12/2/2023 10:20 AM    Current PCP: No primary care provider on file.   Clinic patient: No    Hospitalization in the last 30 days: Yes  Readmission Assessment  Number of Days since last admission?: 8-30 days  Previous Disposition: Home with Family  Who is being Interviewed: Caregiver (CHART REVIEW)  What was the patient's/caregiver's perception as to why they think they needed to return back to the hospital?: Other (Comment) (RESP DISTRESS)  Did you visit your Primary Care Physician after you left the hospital, before you returned this time?: No  Why weren't you able to visit your PCP?: Did not have an appointment  Did you see a specialist, such as Cardiac, Pulmonary, Orthopedic Physician, etc. after you left the hospital?: No  Who advised the patient to return to the hospital?: Self-referral  Does the patient report anything that got in the way of taking their medications?: No  In our efforts to provide the best possible care to you and others like you, can you think of anything that we could have done to help you after you left the hospital the first time, so that you might not have needed to return so soon?: Teaching during hospitalization regarding your illness, Teach back instructions regarding management of illness, Improved written discharge instructions, Identify patient's health literacy needs    Advance Directives:  Code Status: Full Code  [place] DNR form completed and on chart: Not Indicated    Financial:  Payor: Susan Houston / Plan: Rachel Philippe / Product Type: *No Product type* /

## 2023-12-11 NOTE — PROGRESS NOTES
Education about risk for aspiration and importance of following ordered diet provide by this nurse. Family has brought in food and drinks from outside the hospital. Pt states \"I know, I have already been told, but I will continue to eat my food\". Pt expresses annoyance by thickened liquids and current diet orders.

## 2023-12-11 NOTE — PLAN OF CARE
Problem: Respiratory - Adult  Goal: Achieves optimal ventilation and oxygenation  Outcome: Progressing     Problem: Discharge Planning  Goal: Discharge to home or other facility with appropriate resources  Outcome: Progressing     Problem: Pain  Goal: Verbalizes/displays adequate comfort level or baseline comfort level  Outcome: Progressing     Problem: Safety - Adult  Goal: Free from fall injury  Outcome: Progressing   Bed/chair alarm on, gripper socks on, call light within reach  Problem: Skin/Tissue Integrity  Goal: Absence of new skin breakdown  Description: 1. Monitor for areas of redness and/or skin breakdown  2. Assess vascular access sites hourly  3. Every 4-6 hours minimum:  Change oxygen saturation probe site  4. Every 4-6 hours:  If on nasal continuous positive airway pressure, respiratory therapy assess nares and determine need for appliance change or resting period.   Outcome: Progressing     Problem: ABCDS Injury Assessment  Goal: Absence of physical injury  Outcome: Progressing     Problem: Chronic Conditions and Co-morbidities  Goal: Patient's chronic conditions and co-morbidity symptoms are monitored and maintained or improved  Outcome: Progressing     Problem: Nutrition Deficit:  Goal: Optimize nutritional status  Outcome: Progressing     Problem: Cardiovascular - Adult  Goal: Maintains optimal cardiac output and hemodynamic stability  Outcome: Progressing     Problem: Skin/Tissue Integrity - Adult  Goal: Oral mucous membranes remain intact  Outcome: Progressing

## 2023-12-11 NOTE — PROGRESS NOTES
RN notifies MD that patient is not following the diet order. Family member brought in food and drinks from outside of the hospital. RN explains to patient about the risks of aspiration to the patient. RN explains benefits and risks of thickened liquids. Patient states that she understands.

## 2024-04-28 ENCOUNTER — APPOINTMENT (OUTPATIENT)
Dept: GENERAL RADIOLOGY | Age: 64
End: 2024-04-28
Payer: COMMERCIAL

## 2024-04-28 ENCOUNTER — HOSPITAL ENCOUNTER (EMERGENCY)
Age: 64
Discharge: HOME | End: 2024-04-28
Attending: EMERGENCY MEDICINE
Payer: COMMERCIAL

## 2024-04-28 VITALS
DIASTOLIC BLOOD PRESSURE: 86 MMHG | OXYGEN SATURATION: 91 % | WEIGHT: 152.2 LBS | TEMPERATURE: 98.7 F | BODY MASS INDEX: 28.74 KG/M2 | HEART RATE: 96 BPM | RESPIRATION RATE: 20 BRPM | HEIGHT: 61 IN | SYSTOLIC BLOOD PRESSURE: 157 MMHG

## 2024-04-28 DIAGNOSIS — S90.111A CONTUSION OF RIGHT GREAT TOE WITHOUT DAMAGE TO NAIL, INITIAL ENCOUNTER: Primary | ICD-10-CM

## 2024-04-28 PROCEDURE — 73630 X-RAY EXAM OF FOOT: CPT

## 2024-04-28 PROCEDURE — 99283 EMERGENCY DEPT VISIT LOW MDM: CPT

## 2024-04-28 RX ORDER — OXYCODONE HYDROCHLORIDE AND ACETAMINOPHEN 5; 325 MG/1; MG/1
1 TABLET ORAL EVERY 6 HOURS PRN
Qty: 9 TABLET | Refills: 0 | Status: SHIPPED | OUTPATIENT
Start: 2024-04-28 | End: 2024-05-01

## 2024-04-28 RX ORDER — HYDROCODONE BITARTRATE AND ACETAMINOPHEN 5; 325 MG/1; MG/1
1 TABLET ORAL ONCE
Status: DISCONTINUED | OUTPATIENT
Start: 2024-04-28 | End: 2024-04-28

## 2024-04-28 RX ORDER — OXYCODONE HYDROCHLORIDE AND ACETAMINOPHEN 5; 325 MG/1; MG/1
1 TABLET ORAL ONCE
Status: DISCONTINUED | OUTPATIENT
Start: 2024-04-28 | End: 2024-04-28 | Stop reason: HOSPADM

## 2024-04-28 ASSESSMENT — PAIN DESCRIPTION - LOCATION: LOCATION: TOE (COMMENT WHICH ONE)

## 2024-04-28 ASSESSMENT — PAIN DESCRIPTION - ORIENTATION: ORIENTATION: RIGHT

## 2024-04-28 ASSESSMENT — PAIN - FUNCTIONAL ASSESSMENT
PAIN_FUNCTIONAL_ASSESSMENT: NONE - DENIES PAIN
PAIN_FUNCTIONAL_ASSESSMENT: 0-10

## 2024-04-28 ASSESSMENT — PAIN DESCRIPTION - FREQUENCY: FREQUENCY: CONTINUOUS

## 2024-04-28 ASSESSMENT — PAIN SCALES - GENERAL: PAINLEVEL_OUTOF10: 10

## 2024-04-28 ASSESSMENT — PAIN DESCRIPTION - DESCRIPTORS: DESCRIPTORS: ACHING

## 2024-04-28 NOTE — DISCHARGE INSTR - COC
Continuity of Care Form    Patient Name: Susanne Jason   :  1960  MRN:  [id number]    Admit date:  2024  Discharge date:  ***    Code Status Order: Prior   Advance Directives:     Admitting Physician:  No admitting provider for patient encounter.  PCP: No primary care provider on file.    Discharging Nurse: ***  Discharging Hospital Unit/Room#: 15/15  Discharging Unit Phone Number: ***    Emergency Contact:   Extended Emergency Contact Information  Primary Emergency Contact: Abebe Mills  Home Phone: [phone]  Mobile Phone: [phone]  Relation: Other  Secondary Emergency Contact: Lisbeth Jason  Mobile Phone: [phone]  Relation: Child  Preferred language: English    Past Surgical History:  Past Surgical History:   Procedure Laterality Date    COLONOSCOPY      LAPAROSCOPY      OTHER SURGICAL HISTORY  2015    PLANTAR FASCIOTOMY RIGHT FOOT; STEROID INJECTION LEFT HEEL    OTHER SURGICAL HISTORY      patient states surgery for  stomach ulcer    PLANTAR FASCIA SURGERY Left 3/30/16    ENDOSCOPIC PLANTAR FASCIOTOMY LEFT FOOT    WISDOM TOOTH EXTRACTION         Immunization History:     There is no immunization history on file for this patient.    Active Problems:  Patient Active Problem List   Diagnosis Code    COPD with acute exacerbation ([facility]) J44.1    COPD (chronic obstructive pulmonary disease) ([facility]) J44.9    Acute hypercapnic respiratory failure ([facility]) J96.02       Isolation/Infection:   Isolation            No Isolation          Patient Infection Status       None to display                     Nurse Assessment:  Last Vital Signs: BP (!) 157/86   Pulse 96   Temp 98.7 °F (37.1 °C) (Oral)   Resp 20   Ht 1.549 m (5' 1\")   Wt 69 kg (152 lb 3.2 oz)   LMP 2013   SpO2 91%   BMI 28.76 kg/m²     Last documented pain score (0-10 scale): Pain Level: 10  Last Weight:   Wt Readings from Last 1 Encounters:   24 69 kg (152 lb 3.2 oz)     Mental Status:  {IP PT MENTAL  STATUS:}    IV Access:  { KATIE IV ACCESS:378326985}    Nursing Mobility/ADLs:  Walking   {CHP DME ADLs:070223683}  Transfer  {CHP DME ADLs:788298851}  Bathing  {CHP DME ADLs:758766341}  Dressing  {CHP DME ADLs:555730819}  Toileting  {CHP DME ADLs:927978556}  Feeding  {CHP DME ADLs:339177368}  Med Admin  {CHP DME ADLs:384885297}  Med Delivery   { KATIE MED Delivery:196813591}    Wound Care Documentation and Therapy:        Elimination:  Continence:   Bowel: {YES / NO:}  Bladder: {YES / NO:}  Urinary Catheter: {Urinary Catheter:967422451}   Colostomy/Ileostomy/Ileal Conduit: {YES / NO:}       Date of Last BM: ***  No intake or output data in the 24 hours ending 24 1428  No intake/output data recorded.    Safety Concerns:     { KATIE Safety Concerns:864175421}    Impairments/Disabilities:      {[facility] Impairments/Disabilities:576732882}    Nutrition Therapy:  Current Nutrition Therapy:   {[facility] Diet List:381522803}    Routes of Feeding: {[facility] DME Other Feedings:205415442}  Liquids: {Slp liquid thickness:08644}  Daily Fluid Restriction: {CHP DME Yes amt example:196431894}  Last Modified Barium Swallow with Video (Video Swallowing Test): {Done Not Done Date:}    Treatments at the Time of Hospital Discharge:   Respiratory Treatments: ***  Oxygen Therapy:  {Therapy; copd oxygen:85073}  Ventilator:    {[facility] Vent List:574559960}    Rehab Therapies: {THERAPEUTIC INTERVENTION:1333448642}  Weight Bearing Status/Restrictions: {[facility] Weight Bearin}  Other Medical Equipment (for information only, NOT a DME order):  {EQUIPMENT:754282167}  Other Treatments: ***    Patient's personal belongings (please select all that are sent with patient):  {[facility] DME Belongings:489920223}    RN SIGNATURE:  {Esignature:530027880}    CASE MANAGEMENT/SOCIAL WORK SECTION    Inpatient Status Date: ***    Readmission Risk Assessment Score:  Readmission Risk              Risk of Unplanned Readmission:  0

## 2024-04-28 NOTE — ED PROVIDER NOTES
EMERGENCY MEDICINE PROVIDER NOTE    Patient Identification  Pt Name: Susanne Jason  MRN: [id number]  Birthdate 1960  Date of evaluation: 4/28/2024  Provider: Keyon Rouse MD  PCP: No primary care provider on file.    Chief Complaint  Toe Injury (Right great toe)      HPI  (History provided by patient)  This is a 64 y.o. female who was brought in by self for evaluation of an injury to her right first toe.  Patient dropped a 5 pound candle on her toe yesterday.  He has since developed progressively worsening swelling and redness.  Has also become more painful.  It is difficult to walk on secondary to the pain.  She has no other injuries..     I have reviewed the following nursing documentation:  Allergies: Latex    Past medical history:   Past Medical History:   Diagnosis Date    Asthma     COPD (chronic obstructive pulmonary disease) (HCC)     Diabetes mellitus (HCC)     GERD (gastroesophageal reflux disease)      Past surgical history:   Past Surgical History:   Procedure Laterality Date    COLONOSCOPY      LAPAROSCOPY      OTHER SURGICAL HISTORY  1/9/2015    PLANTAR FASCIOTOMY RIGHT FOOT; STEROID INJECTION LEFT HEEL    OTHER SURGICAL HISTORY      patient states surgery for  stomach ulcer    PLANTAR FASCIA SURGERY Left 3/30/16    ENDOSCOPIC PLANTAR FASCIOTOMY LEFT FOOT    WISDOM TOOTH EXTRACTION         Home medications:   Previous Medications    ALBUTEROL SULFATE HFA (PROVENTIL;VENTOLIN;PROAIR) 108 (90 BASE) MCG/ACT INHALER    Inhale 2 puffs into the lungs every 6 hours as needed for Wheezing    BENZOCAINE-MENTHOL (CEPACOL) 6-10 MG LOZG LOZENGE    Take 1 lozenge by mouth every 6 hours    BUDESONIDE-FORMOTEROL (SYMBICORT) 160-4.5 MCG/ACT AERO    Inhale 2 puffs into the lungs 2 times daily    LOSARTAN (COZAAR) 25 MG TABLET    Take 1 tablet by mouth daily    METFORMIN (GLUCOPHAGE-XR) 500 MG EXTENDED RELEASE TABLET    Take 1 tablet by mouth daily (with breakfast)    METHYL SALICYLATE-MENTHOL (NAKIA FLOOD  Determinants of Health (EM 2023) (Optional):20871}    Chronic Conditions affecting care   has a past medical history of Asthma, COPD (chronic obstructive pulmonary disease) (HCC), Diabetes mellitus (HCC), and GERD (gastroesophageal reflux disease).    MDM and ED Course  ***(EMP MDM)          The total Critical Care time is *** minutes which excludes separately billable procedures.      Final Impression  1. Contusion of right great toe without damage to nail, initial encounter        Blood pressure (!) 157/86, pulse 96, temperature 98.7 °F (37.1 °C), temperature source Oral, resp. rate 20, height 1.549 m (5' 1\"), weight 69 kg (152 lb 3.2 oz), last menstrual period 01/09/2013, SpO2 91 %.     Disposition:  DISPOSITION Decision To Discharge 04/28/2024 02:13:46 PM      Patient Referrals:  Parveen Marinelli DPM  [address] [id number] [phone]    Schedule an appointment as soon as possible for a visit   Podiatry    [facility]  [address] [place] [address]  [phone]    As needed, If symptoms worsen or new symptoms develop      Discharge Medications:  New Prescriptions    OXYCODONE-ACETAMINOPHEN (PERCOCET) 5-325 MG PER TABLET    Take 1 tablet by mouth every 6 hours as needed for Pain for up to 3 days. Intended supply: 3 days. Take lowest dose possible to manage pain Max Daily Amount: 4 tablets       This chart was generated using the Dragon dictation system. I created this record but it may contain dictation errors given the limitations of this technology.

## 2024-04-28 NOTE — ED NOTES
Patient to ed with complaints of a right great toe injury after dropping a 5 lb candle on her toe yesterday, to is red and swollen.

## 2024-06-22 ENCOUNTER — APPOINTMENT (OUTPATIENT)
Dept: GENERAL RADIOLOGY | Age: 64
DRG: 140 | End: 2024-06-22
Payer: COMMERCIAL

## 2024-06-22 ENCOUNTER — HOSPITAL ENCOUNTER (INPATIENT)
Age: 64
LOS: 5 days | Discharge: HOME | DRG: 140 | End: 2024-06-27
Attending: STUDENT IN AN ORGANIZED HEALTH CARE EDUCATION/TRAINING PROGRAM | Admitting: STUDENT IN AN ORGANIZED HEALTH CARE EDUCATION/TRAINING PROGRAM
Payer: COMMERCIAL

## 2024-06-22 DIAGNOSIS — J44.1 CHRONIC OBSTRUCTIVE PULMONARY DISEASE WITH ACUTE EXACERBATION (HCC): ICD-10-CM

## 2024-06-22 DIAGNOSIS — J44.1 COPD EXACERBATION (HCC): Primary | ICD-10-CM

## 2024-06-22 DIAGNOSIS — U07.1 COVID-19: ICD-10-CM

## 2024-06-22 LAB
ANION GAP SERPL CALCULATED.3IONS-SCNC: 12 MMOL/L (ref 3–16)
BASE EXCESS BLDV CALC-SCNC: -3.2 MMOL/L (ref -2–3)
BASE EXCESS BLDV CALC-SCNC: 0.6 MMOL/L (ref -2–3)
BASE EXCESS BLDV CALC-SCNC: 5.3 MMOL/L (ref -2–3)
BASOPHILS # BLD: 0 K/UL (ref 0–0.2)
BASOPHILS NFR BLD: 0.4 %
BUN SERPL-MCNC: 13 MG/DL (ref 7–20)
CALCIUM SERPL-MCNC: 9.3 MG/DL (ref 8.3–10.6)
CHLORIDE SERPL-SCNC: 101 MMOL/L (ref 99–110)
CO2 BLDV-SCNC: 25 MMOL/L
CO2 BLDV-SCNC: 32 MMOL/L
CO2 BLDV-SCNC: 34 MMOL/L
CO2 SERPL-SCNC: 27 MMOL/L (ref 21–32)
COHGB MFR BLDV: 1.9 % (ref 0–1.5)
COHGB MFR BLDV: 2.3 % (ref 0–1.5)
COHGB MFR BLDV: 3.1 % (ref 0–1.5)
CREAT SERPL-MCNC: 0.8 MG/DL (ref 0.6–1.2)
DEPRECATED RDW RBC AUTO: 19.8 % (ref 12.4–15.4)
DO-HGB MFR BLDV: 17.6 %
DO-HGB MFR BLDV: 35.5 %
DO-HGB MFR BLDV: 56.2 %
EOSINOPHIL # BLD: 0 K/UL (ref 0–0.6)
EOSINOPHIL NFR BLD: 0.5 %
FLUAV RNA RESP QL NAA+PROBE: NOT DETECTED
FLUBV RNA RESP QL NAA+PROBE: NOT DETECTED
GFR SERPLBLD CREATININE-BSD FMLA CKD-EPI: 82 ML/MIN/{1.73_M2}
GLUCOSE SERPL-MCNC: 131 MG/DL (ref 70–99)
HCO3 BLDV-SCNC: 23.8 MMOL/L (ref 24–28)
HCO3 BLDV-SCNC: 29.6 MMOL/L (ref 24–28)
HCO3 BLDV-SCNC: 32.2 MMOL/L (ref 24–28)
HCT VFR BLD AUTO: 39.4 % (ref 36–48)
HGB BLD-MCNC: 12.3 G/DL (ref 12–16)
LYMPHOCYTES # BLD: 2.4 K/UL (ref 1–5.1)
LYMPHOCYTES NFR BLD: 28.1 %
MCH RBC QN AUTO: 22.7 PG (ref 26–34)
MCHC RBC AUTO-ENTMCNC: 31.3 G/DL (ref 31–36)
MCV RBC AUTO: 72.5 FL (ref 80–100)
METHGB MFR BLDV: 0.3 % (ref 0–1.5)
METHGB MFR BLDV: 0.4 % (ref 0–1.5)
METHGB MFR BLDV: 0.4 % (ref 0–1.5)
MONOCYTES # BLD: 1 K/UL (ref 0–1.3)
MONOCYTES NFR BLD: 11.5 %
NEUTROPHILS # BLD: 5.1 K/UL (ref 1.7–7.7)
NEUTROPHILS NFR BLD: 59.5 %
NT-PROBNP SERPL-MCNC: 2686 PG/ML (ref 0–124)
PCO2 BLDV: 51 MMHG (ref 41–51)
PCO2 BLDV: 56.9 MMHG (ref 41–51)
PCO2 BLDV: 69 MMHG (ref 41–51)
PH BLDV: 7.24 [PH] (ref 7.35–7.45)
PH BLDV: 7.28 [PH] (ref 7.35–7.45)
PH BLDV: 7.36 [PH] (ref 7.35–7.45)
PLATELET # BLD AUTO: 311 K/UL (ref 135–450)
PMV BLD AUTO: 8.1 FL (ref 5–10.5)
PO2 BLDV: 34.7 MMHG (ref 25–40)
PO2 BLDV: 52.1 MMHG (ref 25–40)
PO2 BLDV: <30 MMHG (ref 25–40)
POTASSIUM SERPL-SCNC: 4.2 MMOL/L (ref 3.5–5.1)
RBC # BLD AUTO: 5.43 M/UL (ref 4–5.2)
SAO2 % BLDV: 42 %
SAO2 % BLDV: 63 %
SAO2 % BLDV: 82 %
SARS-COV-2 RNA RESP QL NAA+PROBE: DETECTED
SODIUM SERPL-SCNC: 140 MMOL/L (ref 136–145)
TROPONIN, HIGH SENSITIVITY: 64 NG/L (ref 0–14)
TROPONIN, HIGH SENSITIVITY: 69 NG/L (ref 0–14)
WBC # BLD AUTO: 8.5 K/UL (ref 4–11)

## 2024-06-22 PROCEDURE — 99285 EMERGENCY DEPT VISIT HI MDM: CPT

## 2024-06-22 PROCEDURE — 2060000000 HC ICU INTERMEDIATE R&B

## 2024-06-22 PROCEDURE — 94644 CONT INHLJ TX 1ST HOUR: CPT

## 2024-06-22 PROCEDURE — 94660 CPAP INITIATION&MGMT: CPT

## 2024-06-22 PROCEDURE — 82803 BLOOD GASES ANY COMBINATION: CPT

## 2024-06-22 PROCEDURE — 94761 N-INVAS EAR/PLS OXIMETRY MLT: CPT

## 2024-06-22 PROCEDURE — 87636 SARSCOV2 & INF A&B AMP PRB: CPT

## 2024-06-22 PROCEDURE — 6370000000 HC RX 637 (ALT 250 FOR IP): Performed by: PHYSICIAN ASSISTANT

## 2024-06-22 PROCEDURE — 93005 ELECTROCARDIOGRAM TRACING: CPT | Performed by: PHYSICIAN ASSISTANT

## 2024-06-22 PROCEDURE — 71046 X-RAY EXAM CHEST 2 VIEWS: CPT

## 2024-06-22 PROCEDURE — 94640 AIRWAY INHALATION TREATMENT: CPT

## 2024-06-22 PROCEDURE — 84484 ASSAY OF TROPONIN QUANT: CPT

## 2024-06-22 PROCEDURE — 2580000003 HC RX 258

## 2024-06-22 PROCEDURE — 5A09357 ASSISTANCE WITH RESPIRATORY VENTILATION, LESS THAN 24 CONSECUTIVE HOURS, CONTINUOUS POSITIVE AIRWAY PRESSURE: ICD-10-PCS | Performed by: STUDENT IN AN ORGANIZED HEALTH CARE EDUCATION/TRAINING PROGRAM

## 2024-06-22 PROCEDURE — 80048 BASIC METABOLIC PNL TOTAL CA: CPT

## 2024-06-22 PROCEDURE — 85025 COMPLETE CBC W/AUTO DIFF WBC: CPT

## 2024-06-22 PROCEDURE — 6370000000 HC RX 637 (ALT 250 FOR IP)

## 2024-06-22 PROCEDURE — 83880 ASSAY OF NATRIURETIC PEPTIDE: CPT

## 2024-06-22 PROCEDURE — 2700000000 HC OXYGEN THERAPY PER DAY

## 2024-06-22 PROCEDURE — 36415 COLL VENOUS BLD VENIPUNCTURE: CPT

## 2024-06-22 PROCEDURE — 6360000002 HC RX W HCPCS: Performed by: PHYSICIAN ASSISTANT

## 2024-06-22 PROCEDURE — 6360000002 HC RX W HCPCS

## 2024-06-22 RX ORDER — SODIUM CHLORIDE 9 MG/ML
INJECTION, SOLUTION INTRAVENOUS PRN
Status: DISCONTINUED | OUTPATIENT
Start: 2024-06-22 | End: 2024-06-27 | Stop reason: HOSPADM

## 2024-06-22 RX ORDER — LEVOFLOXACIN 500 MG/1
500 TABLET, FILM COATED ORAL DAILY
Status: DISCONTINUED | OUTPATIENT
Start: 2024-06-23 | End: 2024-06-24

## 2024-06-22 RX ORDER — IPRATROPIUM BROMIDE AND ALBUTEROL SULFATE 2.5; .5 MG/3ML; MG/3ML
SOLUTION RESPIRATORY (INHALATION)
Status: DISCONTINUED
Start: 2024-06-22 | End: 2024-06-22

## 2024-06-22 RX ORDER — POLYETHYLENE GLYCOL 3350 17 G/17G
17 POWDER, FOR SOLUTION ORAL DAILY PRN
Status: DISCONTINUED | OUTPATIENT
Start: 2024-06-22 | End: 2024-06-27 | Stop reason: HOSPADM

## 2024-06-22 RX ORDER — ALBUTEROL SULFATE 2.5 MG/3ML
2.5 SOLUTION RESPIRATORY (INHALATION)
Status: COMPLETED | OUTPATIENT
Start: 2024-06-22 | End: 2024-06-22

## 2024-06-22 RX ORDER — ZOLPIDEM TARTRATE 5 MG/1
10 TABLET ORAL NIGHTLY PRN
Status: CANCELLED | OUTPATIENT
Start: 2024-06-22

## 2024-06-22 RX ORDER — ONDANSETRON 2 MG/ML
4 INJECTION INTRAMUSCULAR; INTRAVENOUS EVERY 6 HOURS PRN
Status: DISCONTINUED | OUTPATIENT
Start: 2024-06-22 | End: 2024-06-27 | Stop reason: HOSPADM

## 2024-06-22 RX ORDER — DEXAMETHASONE 4 MG/1
6 TABLET ORAL EVERY 12 HOURS SCHEDULED
Status: DISCONTINUED | OUTPATIENT
Start: 2024-06-23 | End: 2024-06-23

## 2024-06-22 RX ORDER — AZITHROMYCIN 250 MG/1
500 TABLET, FILM COATED ORAL ONCE
Status: COMPLETED | OUTPATIENT
Start: 2024-06-22 | End: 2024-06-22

## 2024-06-22 RX ORDER — ROSUVASTATIN CALCIUM 20 MG/1
20 TABLET, COATED ORAL NIGHTLY
Status: DISCONTINUED | OUTPATIENT
Start: 2024-06-22 | End: 2024-06-27 | Stop reason: HOSPADM

## 2024-06-22 RX ORDER — MAGNESIUM SULFATE IN WATER 40 MG/ML
2000 INJECTION, SOLUTION INTRAVENOUS PRN
Status: DISCONTINUED | OUTPATIENT
Start: 2024-06-22 | End: 2024-06-27 | Stop reason: HOSPADM

## 2024-06-22 RX ORDER — ONDANSETRON 4 MG/1
4 TABLET, ORALLY DISINTEGRATING ORAL EVERY 8 HOURS PRN
Status: DISCONTINUED | OUTPATIENT
Start: 2024-06-22 | End: 2024-06-27 | Stop reason: HOSPADM

## 2024-06-22 RX ORDER — ALBUTEROL SULFATE 2.5 MG/3ML
10 SOLUTION RESPIRATORY (INHALATION) CONTINUOUS
Status: DISCONTINUED | OUTPATIENT
Start: 2024-06-22 | End: 2024-06-22

## 2024-06-22 RX ORDER — ALBUTEROL SULFATE 2.5 MG/3ML
2.5 SOLUTION RESPIRATORY (INHALATION) EVERY 6 HOURS PRN
COMMUNITY

## 2024-06-22 RX ORDER — ENOXAPARIN SODIUM 100 MG/ML
40 INJECTION SUBCUTANEOUS DAILY
Status: DISCONTINUED | OUTPATIENT
Start: 2024-06-23 | End: 2024-06-27 | Stop reason: HOSPADM

## 2024-06-22 RX ORDER — PREDNISONE 20 MG/1
40 TABLET ORAL DAILY
Status: CANCELLED | OUTPATIENT
Start: 2024-06-23

## 2024-06-22 RX ORDER — ACETAMINOPHEN 325 MG/1
650 TABLET ORAL EVERY 6 HOURS PRN
Status: DISCONTINUED | OUTPATIENT
Start: 2024-06-22 | End: 2024-06-27 | Stop reason: HOSPADM

## 2024-06-22 RX ORDER — ZOLPIDEM TARTRATE 5 MG/1
5 TABLET ORAL NIGHTLY PRN
COMMUNITY

## 2024-06-22 RX ORDER — ZOLPIDEM TARTRATE 5 MG/1
5 TABLET ORAL NIGHTLY PRN
Status: DISCONTINUED | OUTPATIENT
Start: 2024-06-22 | End: 2024-06-27 | Stop reason: HOSPADM

## 2024-06-22 RX ORDER — IPRATROPIUM BROMIDE AND ALBUTEROL SULFATE 2.5; .5 MG/3ML; MG/3ML
1 SOLUTION RESPIRATORY (INHALATION)
Status: DISCONTINUED | OUTPATIENT
Start: 2024-06-22 | End: 2024-06-22 | Stop reason: ALTCHOICE

## 2024-06-22 RX ORDER — POTASSIUM CHLORIDE 7.45 MG/ML
10 INJECTION INTRAVENOUS PRN
Status: DISCONTINUED | OUTPATIENT
Start: 2024-06-22 | End: 2024-06-27 | Stop reason: HOSPADM

## 2024-06-22 RX ORDER — SODIUM CHLORIDE 0.9 % (FLUSH) 0.9 %
5-40 SYRINGE (ML) INJECTION PRN
Status: DISCONTINUED | OUTPATIENT
Start: 2024-06-22 | End: 2024-06-27 | Stop reason: HOSPADM

## 2024-06-22 RX ORDER — ACETAMINOPHEN 650 MG/1
650 SUPPOSITORY RECTAL EVERY 6 HOURS PRN
Status: DISCONTINUED | OUTPATIENT
Start: 2024-06-22 | End: 2024-06-27 | Stop reason: HOSPADM

## 2024-06-22 RX ORDER — ALBUTEROL SULFATE 2.5 MG/3ML
2.5 SOLUTION RESPIRATORY (INHALATION)
Status: DISCONTINUED | OUTPATIENT
Start: 2024-06-22 | End: 2024-06-22

## 2024-06-22 RX ORDER — TRAZODONE HYDROCHLORIDE 50 MG/1
50 TABLET ORAL NIGHTLY
Status: DISCONTINUED | OUTPATIENT
Start: 2024-06-22 | End: 2024-06-22

## 2024-06-22 RX ORDER — ALBUTEROL SULFATE 2.5 MG/3ML
SOLUTION RESPIRATORY (INHALATION)
Status: COMPLETED
Start: 2024-06-22 | End: 2024-06-22

## 2024-06-22 RX ORDER — LOSARTAN POTASSIUM 25 MG/1
25 TABLET ORAL DAILY
Status: DISCONTINUED | OUTPATIENT
Start: 2024-06-23 | End: 2024-06-27 | Stop reason: HOSPADM

## 2024-06-22 RX ORDER — PREDNISONE 10 MG/1
TABLET ORAL
Status: ON HOLD | COMMUNITY
Start: 2024-06-14 | End: 2024-06-27

## 2024-06-22 RX ORDER — POTASSIUM CHLORIDE 20 MEQ/1
40 TABLET, EXTENDED RELEASE ORAL PRN
Status: DISCONTINUED | OUTPATIENT
Start: 2024-06-22 | End: 2024-06-27 | Stop reason: HOSPADM

## 2024-06-22 RX ORDER — SODIUM CHLORIDE 0.9 % (FLUSH) 0.9 %
5-40 SYRINGE (ML) INJECTION EVERY 12 HOURS SCHEDULED
Status: DISCONTINUED | OUTPATIENT
Start: 2024-06-22 | End: 2024-06-27 | Stop reason: HOSPADM

## 2024-06-22 RX ORDER — PREDNISONE 20 MG/1
60 TABLET ORAL ONCE
Status: COMPLETED | OUTPATIENT
Start: 2024-06-22 | End: 2024-06-22

## 2024-06-22 RX ADMIN — ACETAMINOPHEN 650 MG: 325 TABLET ORAL at 22:31

## 2024-06-22 RX ADMIN — ZOLPIDEM TARTRATE 5 MG: 5 TABLET ORAL at 22:56

## 2024-06-22 RX ADMIN — SODIUM CHLORIDE, PRESERVATIVE FREE 10 ML: 5 INJECTION INTRAVENOUS at 22:56

## 2024-06-22 RX ADMIN — ALBUTEROL SULFATE 2.5 MG: 2.5 SOLUTION RESPIRATORY (INHALATION) at 16:41

## 2024-06-22 RX ADMIN — ALBUTEROL SULFATE 10 MG: 2.5 SOLUTION RESPIRATORY (INHALATION) at 19:40

## 2024-06-22 RX ADMIN — IPRATROPIUM BROMIDE AND ALBUTEROL SULFATE 1 DOSE: 2.5; .5 SOLUTION RESPIRATORY (INHALATION) at 19:40

## 2024-06-22 RX ADMIN — ALBUTEROL SULFATE 2.5 MG: 2.5 SOLUTION RESPIRATORY (INHALATION) at 17:59

## 2024-06-22 RX ADMIN — ROSUVASTATIN CALCIUM 20 MG: 20 TABLET, COATED ORAL at 22:56

## 2024-06-22 RX ADMIN — PREDNISONE 60 MG: 20 TABLET ORAL at 16:42

## 2024-06-22 RX ADMIN — IPRATROPIUM BROMIDE AND ALBUTEROL SULFATE 1 DOSE: 2.5; .5 SOLUTION RESPIRATORY (INHALATION) at 16:17

## 2024-06-22 RX ADMIN — ALBUTEROL SULFATE 2.5 MG: 2.5 SOLUTION RESPIRATORY (INHALATION) at 16:26

## 2024-06-22 RX ADMIN — AZITHROMYCIN DIHYDRATE 500 MG: 250 TABLET, FILM COATED ORAL at 16:42

## 2024-06-22 ASSESSMENT — PAIN - FUNCTIONAL ASSESSMENT: PAIN_FUNCTIONAL_ASSESSMENT: ACTIVITIES ARE NOT PREVENTED

## 2024-06-22 ASSESSMENT — PAIN DESCRIPTION - ORIENTATION: ORIENTATION: MID

## 2024-06-22 ASSESSMENT — PAIN DESCRIPTION - DESCRIPTORS: DESCRIPTORS: ACHING

## 2024-06-22 ASSESSMENT — PAIN SCALES - GENERAL
PAINLEVEL_OUTOF10: 0
PAINLEVEL_OUTOF10: 3
PAINLEVEL_OUTOF10: 0

## 2024-06-22 ASSESSMENT — PAIN DESCRIPTION - LOCATION: LOCATION: HEAD

## 2024-06-22 ASSESSMENT — PAIN DESCRIPTION - PAIN TYPE: TYPE: ACUTE PAIN

## 2024-06-22 ASSESSMENT — PAIN DESCRIPTION - ONSET: ONSET: GRADUAL

## 2024-06-22 ASSESSMENT — PAIN DESCRIPTION - FREQUENCY: FREQUENCY: INTERMITTENT

## 2024-06-22 NOTE — ED PROVIDER NOTES
interpretation performed. Decision-making details documented in ED Course.    Risk  Prescription drug management.  Decision regarding hospitalization.             Tung Harris MD  06/23/24 0427    
nebulizer solution 10 mg     Order Specific Question:   Initiate RT Bronchodilator Protocol     Answer:   Yes - Inpatient Protocol       CONSULTS:  None    Review of Systems     Review of Systems   Constitutional:  Positive for chills. Negative for fever.   HENT:  Negative for congestion and sore throat.    Eyes:  Negative for discharge and redness.   Respiratory:  Positive for cough, shortness of breath and wheezing.    Cardiovascular:  Negative for chest pain.   Gastrointestinal:  Negative for abdominal pain, nausea and vomiting.   Genitourinary:  Negative for dysuria and hematuria.   Musculoskeletal: Negative.    Skin:  Negative for rash.   Neurological:  Negative for weakness.   Psychiatric/Behavioral: Negative.         Past Medical, Surgical, Family, and Social History     She has a past medical history of Asthma, COPD (chronic obstructive pulmonary disease) ([facility]), Diabetes mellitus ([facility]), and GERD (gastroesophageal reflux disease).  She has a past surgical history that includes Colonoscopy; laparoscopy; [place] tooth extraction; other surgical history (1/9/2015); other surgical history; and Plantar fascia surgery (Left, 3/30/16).  Her family history is not on file.  She reports that she has been smoking. She has a 15.0 pack-year smoking history. She does not have any smokeless tobacco history on file. She reports that she does not drink alcohol and does not use drugs.    Medications     Previous Medications    ALBUTEROL SULFATE HFA (PROVENTIL;VENTOLIN;PROAIR) 108 (90 BASE) MCG/ACT INHALER    Inhale 2 puffs into the lungs every 6 hours as needed for Wheezing    BENZOCAINE-MENTHOL (CEPACOL) 6-10 MG LOZG LOZENGE    Take 1 lozenge by mouth every 6 hours    BUDESONIDE-FORMOTEROL (SYMBICORT) 160-4.5 MCG/ACT AERO    Inhale 2 puffs into the lungs 2 times daily    LOSARTAN (COZAAR) 25 MG TABLET    Take 1 tablet by mouth daily    METFORMIN (GLUCOPHAGE-XR) 500 MG EXTENDED RELEASE TABLET    Take 1 tablet by mouth daily

## 2024-06-23 LAB
BASE EXCESS BLDV CALC-SCNC: 4.2 MMOL/L (ref -2–3)
BASOPHILS # BLD: 0 K/UL (ref 0–0.2)
BASOPHILS NFR BLD: 0.1 %
CO2 BLDV-SCNC: 32 MMOL/L
COHGB MFR BLDV: 1.3 % (ref 0–1.5)
CRP SERPL-MCNC: 18.3 MG/L (ref 0–5.1)
DEPRECATED RDW RBC AUTO: 19.2 % (ref 12.4–15.4)
DO-HGB MFR BLDV: 3.3 %
EKG ATRIAL RATE: 96 BPM
EKG DIAGNOSIS: NORMAL
EKG P AXIS: 77 DEGREES
EKG P-R INTERVAL: 130 MS
EKG Q-T INTERVAL: 366 MS
EKG QRS DURATION: 66 MS
EKG QTC CALCULATION (BAZETT): 462 MS
EKG R AXIS: 66 DEGREES
EKG T AXIS: 169 DEGREES
EKG VENTRICULAR RATE: 96 BPM
EOSINOPHIL # BLD: 0 K/UL (ref 0–0.6)
EOSINOPHIL NFR BLD: 0 %
ERYTHROCYTE [SEDIMENTATION RATE] IN BLOOD BY WESTERGREN METHOD: 8 MM/HR (ref 0–30)
GLUCOSE BLD-MCNC: 185 MG/DL (ref 70–99)
GLUCOSE BLD-MCNC: 222 MG/DL (ref 70–99)
GLUCOSE BLD-MCNC: 269 MG/DL (ref 70–99)
GLUCOSE BLD-MCNC: 312 MG/DL (ref 70–99)
GLUCOSE BLD-MCNC: 320 MG/DL (ref 70–99)
HCO3 BLDV-SCNC: 30.2 MMOL/L (ref 24–28)
HCT VFR BLD AUTO: 36.4 % (ref 36–48)
HGB BLD-MCNC: 11.3 G/DL (ref 12–16)
LACTATE BLDV-SCNC: 2.8 MMOL/L (ref 0.4–2)
LYMPHOCYTES # BLD: 1.3 K/UL (ref 1–5.1)
LYMPHOCYTES NFR BLD: 20.5 %
MAGNESIUM SERPL-MCNC: 1.8 MG/DL (ref 1.8–2.4)
MCH RBC QN AUTO: 22.4 PG (ref 26–34)
MCHC RBC AUTO-ENTMCNC: 31 G/DL (ref 31–36)
MCV RBC AUTO: 72.1 FL (ref 80–100)
METHGB MFR BLDV: 0.3 % (ref 0–1.5)
MONOCYTES # BLD: 0.6 K/UL (ref 0–1.3)
MONOCYTES NFR BLD: 10.3 %
NEUTROPHILS # BLD: 4.4 K/UL (ref 1.7–7.7)
NEUTROPHILS NFR BLD: 69.1 %
PCO2 BLDV: 50.8 MMHG (ref 41–51)
PERFORMED ON: ABNORMAL
PH BLDV: 7.38 [PH] (ref 7.35–7.45)
PLATELET # BLD AUTO: 307 K/UL (ref 135–450)
PMV BLD AUTO: 8.5 FL (ref 5–10.5)
PO2 BLDV: 79.7 MMHG (ref 25–40)
RBC # BLD AUTO: 5.05 M/UL (ref 4–5.2)
SAO2 % BLDV: 97 %
TROPONIN, HIGH SENSITIVITY: 40 NG/L (ref 0–14)
WBC # BLD AUTO: 6.3 K/UL (ref 4–11)

## 2024-06-23 PROCEDURE — 2700000000 HC OXYGEN THERAPY PER DAY

## 2024-06-23 PROCEDURE — 94640 AIRWAY INHALATION TREATMENT: CPT

## 2024-06-23 PROCEDURE — 2580000003 HC RX 258

## 2024-06-23 PROCEDURE — 6360000002 HC RX W HCPCS: Performed by: STUDENT IN AN ORGANIZED HEALTH CARE EDUCATION/TRAINING PROGRAM

## 2024-06-23 PROCEDURE — 94761 N-INVAS EAR/PLS OXIMETRY MLT: CPT

## 2024-06-23 PROCEDURE — 2060000000 HC ICU INTERMEDIATE R&B

## 2024-06-23 PROCEDURE — 83735 ASSAY OF MAGNESIUM: CPT

## 2024-06-23 PROCEDURE — 85652 RBC SED RATE AUTOMATED: CPT

## 2024-06-23 PROCEDURE — 6360000002 HC RX W HCPCS

## 2024-06-23 PROCEDURE — 86140 C-REACTIVE PROTEIN: CPT

## 2024-06-23 PROCEDURE — 36415 COLL VENOUS BLD VENIPUNCTURE: CPT

## 2024-06-23 PROCEDURE — 6370000000 HC RX 637 (ALT 250 FOR IP): Performed by: STUDENT IN AN ORGANIZED HEALTH CARE EDUCATION/TRAINING PROGRAM

## 2024-06-23 PROCEDURE — 6370000000 HC RX 637 (ALT 250 FOR IP): Performed by: INTERNAL MEDICINE

## 2024-06-23 PROCEDURE — 94660 CPAP INITIATION&MGMT: CPT

## 2024-06-23 PROCEDURE — 84484 ASSAY OF TROPONIN QUANT: CPT

## 2024-06-23 PROCEDURE — 85025 COMPLETE CBC W/AUTO DIFF WBC: CPT

## 2024-06-23 PROCEDURE — 3E0DX3Z INTRODUCTION OF ANTI-INFLAMMATORY INTO MOUTH AND PHARYNX, EXTERNAL APPROACH: ICD-10-PCS | Performed by: STUDENT IN AN ORGANIZED HEALTH CARE EDUCATION/TRAINING PROGRAM

## 2024-06-23 PROCEDURE — 6370000000 HC RX 637 (ALT 250 FOR IP): Performed by: PHYSICIAN ASSISTANT

## 2024-06-23 PROCEDURE — 83605 ASSAY OF LACTIC ACID: CPT

## 2024-06-23 PROCEDURE — 6370000000 HC RX 637 (ALT 250 FOR IP)

## 2024-06-23 PROCEDURE — 82803 BLOOD GASES ANY COMBINATION: CPT

## 2024-06-23 RX ORDER — PSEUDOEPHEDRINE HCL 30 MG
30 TABLET ORAL
COMMUNITY

## 2024-06-23 RX ORDER — ARFORMOTEROL TARTRATE 15 UG/2ML
15 SOLUTION RESPIRATORY (INHALATION)
Status: DISCONTINUED | OUTPATIENT
Start: 2024-06-23 | End: 2024-06-23 | Stop reason: ALTCHOICE

## 2024-06-23 RX ORDER — DEXTROSE MONOHYDRATE 100 MG/ML
INJECTION, SOLUTION INTRAVENOUS CONTINUOUS PRN
Status: DISCONTINUED | OUTPATIENT
Start: 2024-06-23 | End: 2024-06-27 | Stop reason: HOSPADM

## 2024-06-23 RX ORDER — ACETAMINOPHEN 325 MG/1
650 TABLET ORAL EVERY 6 HOURS PRN
COMMUNITY

## 2024-06-23 RX ORDER — ROSUVASTATIN CALCIUM 20 MG/1
20 TABLET, COATED ORAL DAILY
COMMUNITY

## 2024-06-23 RX ORDER — GUAIFENESIN 600 MG/1
600 TABLET, EXTENDED RELEASE ORAL 2 TIMES DAILY
Status: DISCONTINUED | OUTPATIENT
Start: 2024-06-23 | End: 2024-06-27 | Stop reason: HOSPADM

## 2024-06-23 RX ORDER — DEXAMETHASONE 4 MG/1
6 TABLET ORAL DAILY
Status: DISCONTINUED | OUTPATIENT
Start: 2024-06-23 | End: 2024-06-24

## 2024-06-23 RX ORDER — INSULIN LISPRO 100 [IU]/ML
0-4 INJECTION, SOLUTION INTRAVENOUS; SUBCUTANEOUS NIGHTLY
Status: DISCONTINUED | OUTPATIENT
Start: 2024-06-23 | End: 2024-06-24

## 2024-06-23 RX ORDER — IBUPROFEN 200 MG
600 TABLET ORAL 2 TIMES DAILY PRN
Status: ON HOLD | COMMUNITY
End: 2024-06-27 | Stop reason: HOSPADM

## 2024-06-23 RX ORDER — BUDESONIDE, GLYCOPYRROLATE, AND FORMOTEROL FUMARATE 160; 9; 4.8 UG/1; UG/1; UG/1
2 AEROSOL, METERED RESPIRATORY (INHALATION) 2 TIMES DAILY
COMMUNITY

## 2024-06-23 RX ORDER — LIDOCAINE 50 MG/G
1 PATCH TOPICAL DAILY
COMMUNITY

## 2024-06-23 RX ORDER — BUDESONIDE 0.5 MG/2ML
0.5 INHALANT ORAL
Status: DISCONTINUED | OUTPATIENT
Start: 2024-06-23 | End: 2024-06-23 | Stop reason: ALTCHOICE

## 2024-06-23 RX ORDER — CODEINE PHOSPHATE AND GUAIFENESIN 10; 100 MG/5ML; MG/5ML
5 SOLUTION ORAL EVERY 4 HOURS PRN
Status: DISPENSED | OUTPATIENT
Start: 2024-06-23 | End: 2024-06-26

## 2024-06-23 RX ORDER — SODIUM CHLORIDE, SODIUM LACTATE, POTASSIUM CHLORIDE, AND CALCIUM CHLORIDE .6; .31; .03; .02 G/100ML; G/100ML; G/100ML; G/100ML
500 INJECTION, SOLUTION INTRAVENOUS ONCE
Status: COMPLETED | OUTPATIENT
Start: 2024-06-23 | End: 2024-06-23

## 2024-06-23 RX ORDER — INSULIN LISPRO 100 [IU]/ML
0-4 INJECTION, SOLUTION INTRAVENOUS; SUBCUTANEOUS
Status: DISCONTINUED | OUTPATIENT
Start: 2024-06-23 | End: 2024-06-24

## 2024-06-23 RX ORDER — GLUCAGON 1 MG/ML
1 KIT INJECTION PRN
Status: DISCONTINUED | OUTPATIENT
Start: 2024-06-23 | End: 2024-06-27 | Stop reason: HOSPADM

## 2024-06-23 RX ORDER — TRAMADOL HYDROCHLORIDE 50 MG/1
50 TABLET ORAL EVERY 6 HOURS PRN
COMMUNITY

## 2024-06-23 RX ORDER — GUAIFENESIN 600 MG/1
600 TABLET, EXTENDED RELEASE ORAL 2 TIMES DAILY PRN
COMMUNITY

## 2024-06-23 RX ADMIN — SODIUM CHLORIDE, POTASSIUM CHLORIDE, SODIUM LACTATE AND CALCIUM CHLORIDE 500 ML: 600; 310; 30; 20 INJECTION, SOLUTION INTRAVENOUS at 11:45

## 2024-06-23 RX ADMIN — IPRATROPIUM BROMIDE AND ALBUTEROL 1 PUFF: 20; 100 SPRAY, METERED RESPIRATORY (INHALATION) at 20:20

## 2024-06-23 RX ADMIN — INSULIN LISPRO 4 UNITS: 100 INJECTION, SOLUTION INTRAVENOUS; SUBCUTANEOUS at 21:38

## 2024-06-23 RX ADMIN — GUAIFENESIN 600 MG: 600 TABLET ORAL at 08:00

## 2024-06-23 RX ADMIN — SODIUM CHLORIDE, PRESERVATIVE FREE 10 ML: 5 INJECTION INTRAVENOUS at 08:01

## 2024-06-23 RX ADMIN — IPRATROPIUM BROMIDE AND ALBUTEROL 1 PUFF: 20; 100 SPRAY, METERED RESPIRATORY (INHALATION) at 14:52

## 2024-06-23 RX ADMIN — MOMETASONE FUROATE AND FORMOTEROL FUMARATE DIHYDRATE 2 PUFF: 200; 5 AEROSOL RESPIRATORY (INHALATION) at 09:33

## 2024-06-23 RX ADMIN — GUAIFENESIN AND CODEINE PHOSPHATE 5 ML: 100; 10 SOLUTION ORAL at 06:39

## 2024-06-23 RX ADMIN — IPRATROPIUM BROMIDE AND ALBUTEROL 1 PUFF: 20; 100 SPRAY, METERED RESPIRATORY (INHALATION) at 12:46

## 2024-06-23 RX ADMIN — MOMETASONE FUROATE AND FORMOTEROL FUMARATE DIHYDRATE 2 PUFF: 200; 5 AEROSOL RESPIRATORY (INHALATION) at 20:20

## 2024-06-23 RX ADMIN — ACETAMINOPHEN 650 MG: 325 TABLET ORAL at 07:59

## 2024-06-23 RX ADMIN — GUAIFENESIN 600 MG: 600 TABLET ORAL at 21:38

## 2024-06-23 RX ADMIN — ZOLPIDEM TARTRATE 5 MG: 5 TABLET ORAL at 21:38

## 2024-06-23 RX ADMIN — GUAIFENESIN 600 MG: 600 TABLET ORAL at 01:47

## 2024-06-23 RX ADMIN — GUAIFENESIN AND CODEINE PHOSPHATE 5 ML: 100; 10 SOLUTION ORAL at 15:51

## 2024-06-23 RX ADMIN — IPRATROPIUM BROMIDE AND ALBUTEROL 1 PUFF: 20; 100 SPRAY, METERED RESPIRATORY (INHALATION) at 09:34

## 2024-06-23 RX ADMIN — INSULIN LISPRO 3 UNITS: 100 INJECTION, SOLUTION INTRAVENOUS; SUBCUTANEOUS at 17:14

## 2024-06-23 RX ADMIN — SODIUM CHLORIDE, PRESERVATIVE FREE 10 ML: 5 INJECTION INTRAVENOUS at 21:45

## 2024-06-23 RX ADMIN — IPRATROPIUM BROMIDE AND ALBUTEROL 1 PUFF: 20; 100 SPRAY, METERED RESPIRATORY (INHALATION) at 02:00

## 2024-06-23 RX ADMIN — LOSARTAN POTASSIUM 25 MG: 25 TABLET, FILM COATED ORAL at 08:00

## 2024-06-23 RX ADMIN — ACETAMINOPHEN 650 MG: 325 TABLET ORAL at 18:38

## 2024-06-23 RX ADMIN — LEVOFLOXACIN 500 MG: 500 TABLET, FILM COATED ORAL at 08:00

## 2024-06-23 RX ADMIN — ROSUVASTATIN CALCIUM 20 MG: 20 TABLET, COATED ORAL at 21:38

## 2024-06-23 RX ADMIN — DEXAMETHASONE 6 MG: 4 TABLET ORAL at 08:00

## 2024-06-23 RX ADMIN — ENOXAPARIN SODIUM 40 MG: 100 INJECTION SUBCUTANEOUS at 08:00

## 2024-06-23 ASSESSMENT — PAIN DESCRIPTION - ORIENTATION
ORIENTATION: MID
ORIENTATION: ANTERIOR

## 2024-06-23 ASSESSMENT — PAIN SCALES - GENERAL
PAINLEVEL_OUTOF10: 3
PAINLEVEL_OUTOF10: 0
PAINLEVEL_OUTOF10: 3
PAINLEVEL_OUTOF10: 0

## 2024-06-23 ASSESSMENT — PAIN DESCRIPTION - DESCRIPTORS
DESCRIPTORS: ACHING
DESCRIPTORS: ACHING

## 2024-06-23 ASSESSMENT — PAIN DESCRIPTION - PAIN TYPE
TYPE: ACUTE PAIN
TYPE: ACUTE PAIN

## 2024-06-23 ASSESSMENT — PAIN DESCRIPTION - LOCATION
LOCATION: ABDOMEN
LOCATION: HEAD

## 2024-06-23 ASSESSMENT — PAIN - FUNCTIONAL ASSESSMENT
PAIN_FUNCTIONAL_ASSESSMENT: ACTIVITIES ARE NOT PREVENTED
PAIN_FUNCTIONAL_ASSESSMENT: ACTIVITIES ARE NOT PREVENTED

## 2024-06-23 ASSESSMENT — PAIN DESCRIPTION - FREQUENCY
FREQUENCY: CONTINUOUS
FREQUENCY: CONTINUOUS

## 2024-06-23 ASSESSMENT — PAIN DESCRIPTION - ONSET
ONSET: ON-GOING
ONSET: ON-GOING

## 2024-06-23 NOTE — CONSULTS
Clinical Pharmacy Consult Note  Medication History     Admit Date: 6/22/2024    Pharmacy consulted to verify home medication list by Dr. Ana West.    List of of current medications patient is taking is complete. Home Medication list in EPIC updated to reflect changes noted below.    Source of information: Patient & Outpatient Dispense Report    Patient's home pharmacy: [id number] ([phone]) for Discharge Rx's from this hospital encounter (Normally, patient fills at [facility] #[id number], [phone])    Changes made to medication list:   Medications removed: (pt reports not taking)  Benzocaine-Menthol Lozenge  Budesonide-Formoterol (Symbicort) Inhaler  Methyl Salicylate-Menthol (Bengay) Cream  Miconazole 2% Cream  Tiotropium Inhaler  Medications added:   Acetaminophen 325 mg Tablets: 2 tabs PO Q6h PRN Pain  Albuterol (2.5 mg/3 mL) 0.083% Nebulizer Solution: Nebulize & Inhale 3 mL Q6h PRN Wheezing/SOB  Cholecalciferol (Vitamin D3) 1.25 mcg (5000 UT) Tablets: 1 tab PO daily  Guaifenesin 600 mg ER Tablets: 1 tab PO BID PRN Congestion  Ibuprofen 200 mg Tablets: 3 tabs PO BID PRN Pain  Lidocaine 5% Patch: 1 patch onto skin daily (12h on, 12h off)  Pseudoephedrine 30 mg Tablets: 1 tab PO Q4-6h PRN Congestion  Rosuvastatin 20 mg Tablets: 1 tab PO daily  Tramadol 50 mg Tablets: 1 tab PO Q6h PRN Pain  Medication doses adjusted:   Zolpidem changed to 5 mg Tablets (1 tab PO QHS) from 10 mg Tablets (0.5 tab PO QHS)  Other notes:   Pt reports Tramadol is not managing their pain well (claims \"doesn't work\")  Pt verified Drug Allergies  Latex: Itching/Rash, Medium, Allergy    Current Outpatient Medications   Medication Instructions    acetaminophen (TYLENOL) 650 mg, Oral, EVERY 6 HOURS PRN    albuterol (PROVENTIL) 2.5 mg, Nebulization, EVERY 6 HOURS PRN    albuterol sulfate HFA (PROVENTIL;VENTOLIN;PROAIR) 108 (90 Base) MCG/ACT inhaler 2 puffs, Inhalation, EVERY 6 HOURS PRN

## 2024-06-23 NOTE — H&P
Internal Medicine H&P    Date:   2024   Patient:  Susanne Jason   :   1960     CC:  Shortness of Breath (Pt coming from home for increased SOB and productive cough since yesterday and states that she was coughing up yellow phlegm. Hx COPD and on 3 L O2 at baseline. )       Source of HPI: patient and chart review     Subjective     HPI:   Ms. Susanne Jason is a 64 y.o. female with a MHx significant for COPD and chronic hypoxic respiratory failure on baseline 3 L oxygen at home, asthma,  hypertension, type II DM, who presented to the ED on 2024 with progressive shortness of breath x 1 week.    SOB started one week ago, reports associated cough productive of yellowish sputum for the past 3 days, denies fevers, reports chills, reports epigastric pain which she associates with coughing, denies sick contacts.    Patient lives at home with granddaughter and 2 grandkids and reports nobody has been sick.  She reports it has been difficult for her to take a shower because she is so short of breath and is also been difficult for her to go from one room to another.  She reports she sleeps with many pillows and it has been hard for her to sleep at night because of her shortness of breath. Patient reports shortness of breath is both at rest and with exertion.  She denies any leg swelling. She was recently placed on azithromycin MWF and prednisone taper on  by her pulmunologist, Dr Landaverde which she is still taking. She reports she has been using her nebulizer a lot in the past week. She reports compliance to her inhaler.     She denies any dysuria, frequency, reports of urgency. Reports some non bloody loose stool which she noted today, denies any abdominal pain.       ED Course:  On arrival to the ED,   Vitals significant for tachycardia to 102, blood pressure 146/89, respiratory rate 16, afebrile, she was initially on room air satting at 97% was reported to be more short of breath walking to

## 2024-06-23 NOTE — ED NOTES
Patient can be non-compliant with oxygen and breathing treatments.  Patient had to be reminded multiple times to keep tx mask and oxygen on.  Patient refused PureWick and bedside commode.  Patient insists on walking to the restroom.  Patient educated about risk of physical exertion at this time     Elizabeth Eli, THEO  06/22/24 2027    
details:   Pending Blood Product Administration: no       You may also review the ED PT Care Timeline found under the Summary Nursing Index tab.    Recommendation    Pending orders ED orders complete  Plan for Discharge (if known):   Additional Comments: 20ga IV right AC, draws and flushes.   If any further questions, please call Sending RN at [phone]    Electronically signed by: Electronically signed by Wally Marshall RN on 6/22/2024 at 6:17 PM      Wally Marshall RN  06/22/24 1470

## 2024-06-24 LAB
ANION GAP SERPL CALCULATED.3IONS-SCNC: 6 MMOL/L (ref 3–16)
BASOPHILS # BLD: 0 K/UL (ref 0–0.2)
BASOPHILS NFR BLD: 0.1 %
BUN SERPL-MCNC: 12 MG/DL (ref 7–20)
CALCIUM SERPL-MCNC: 8.9 MG/DL (ref 8.3–10.6)
CHLORIDE SERPL-SCNC: 102 MMOL/L (ref 99–110)
CO2 SERPL-SCNC: 33 MMOL/L (ref 21–32)
CREAT SERPL-MCNC: 0.7 MG/DL (ref 0.6–1.2)
DEPRECATED RDW RBC AUTO: 19.4 % (ref 12.4–15.4)
EKG ATRIAL RATE: 94 BPM
EKG DIAGNOSIS: NORMAL
EKG P AXIS: 85 DEGREES
EKG P-R INTERVAL: 136 MS
EKG Q-T INTERVAL: 362 MS
EKG QRS DURATION: 70 MS
EKG QTC CALCULATION (BAZETT): 452 MS
EKG R AXIS: 124 DEGREES
EKG T AXIS: 218 DEGREES
EKG VENTRICULAR RATE: 94 BPM
EOSINOPHIL # BLD: 0 K/UL (ref 0–0.6)
EOSINOPHIL NFR BLD: 0.1 %
GFR SERPLBLD CREATININE-BSD FMLA CKD-EPI: >90 ML/MIN/{1.73_M2}
GLUCOSE BLD-MCNC: 134 MG/DL (ref 70–99)
GLUCOSE BLD-MCNC: 272 MG/DL (ref 70–99)
GLUCOSE BLD-MCNC: 295 MG/DL (ref 70–99)
GLUCOSE BLD-MCNC: 350 MG/DL (ref 70–99)
GLUCOSE BLD-MCNC: 353 MG/DL (ref 70–99)
GLUCOSE SERPL-MCNC: 110 MG/DL (ref 70–99)
HCT VFR BLD AUTO: 35 % (ref 36–48)
HGB BLD-MCNC: 11 G/DL (ref 12–16)
LYMPHOCYTES # BLD: 3.3 K/UL (ref 1–5.1)
LYMPHOCYTES NFR BLD: 35.9 %
MAGNESIUM SERPL-MCNC: 1.9 MG/DL (ref 1.8–2.4)
MCH RBC QN AUTO: 22.8 PG (ref 26–34)
MCHC RBC AUTO-ENTMCNC: 31.3 G/DL (ref 31–36)
MCV RBC AUTO: 72.8 FL (ref 80–100)
MONOCYTES # BLD: 1 K/UL (ref 0–1.3)
MONOCYTES NFR BLD: 11.2 %
NEUTROPHILS # BLD: 4.8 K/UL (ref 1.7–7.7)
NEUTROPHILS NFR BLD: 52.7 %
PERFORMED ON: ABNORMAL
PLATELET # BLD AUTO: 275 K/UL (ref 135–450)
PMV BLD AUTO: 8.4 FL (ref 5–10.5)
POTASSIUM SERPL-SCNC: 4.3 MMOL/L (ref 3.5–5.1)
PROCALCITONIN SERPL IA-MCNC: 0.08 NG/ML (ref 0–0.15)
RBC # BLD AUTO: 4.81 M/UL (ref 4–5.2)
SODIUM SERPL-SCNC: 141 MMOL/L (ref 136–145)
WBC # BLD AUTO: 9.1 K/UL (ref 4–11)

## 2024-06-24 PROCEDURE — 2700000000 HC OXYGEN THERAPY PER DAY

## 2024-06-24 PROCEDURE — 2580000003 HC RX 258

## 2024-06-24 PROCEDURE — 80048 BASIC METABOLIC PNL TOTAL CA: CPT

## 2024-06-24 PROCEDURE — 6370000000 HC RX 637 (ALT 250 FOR IP)

## 2024-06-24 PROCEDURE — 6370000000 HC RX 637 (ALT 250 FOR IP): Performed by: INTERNAL MEDICINE

## 2024-06-24 PROCEDURE — 94640 AIRWAY INHALATION TREATMENT: CPT

## 2024-06-24 PROCEDURE — 6360000002 HC RX W HCPCS: Performed by: STUDENT IN AN ORGANIZED HEALTH CARE EDUCATION/TRAINING PROGRAM

## 2024-06-24 PROCEDURE — 2060000000 HC ICU INTERMEDIATE R&B

## 2024-06-24 PROCEDURE — 6360000002 HC RX W HCPCS: Performed by: INTERNAL MEDICINE

## 2024-06-24 PROCEDURE — 83735 ASSAY OF MAGNESIUM: CPT

## 2024-06-24 PROCEDURE — 2580000003 HC RX 258: Performed by: INTERNAL MEDICINE

## 2024-06-24 PROCEDURE — 85025 COMPLETE CBC W/AUTO DIFF WBC: CPT

## 2024-06-24 PROCEDURE — 36415 COLL VENOUS BLD VENIPUNCTURE: CPT

## 2024-06-24 PROCEDURE — 94761 N-INVAS EAR/PLS OXIMETRY MLT: CPT

## 2024-06-24 PROCEDURE — 6360000002 HC RX W HCPCS

## 2024-06-24 PROCEDURE — 83036 HEMOGLOBIN GLYCOSYLATED A1C: CPT

## 2024-06-24 PROCEDURE — 99222 1ST HOSP IP/OBS MODERATE 55: CPT | Performed by: INTERNAL MEDICINE

## 2024-06-24 PROCEDURE — 84145 PROCALCITONIN (PCT): CPT

## 2024-06-24 RX ORDER — INSULIN LISPRO 100 [IU]/ML
0-4 INJECTION, SOLUTION INTRAVENOUS; SUBCUTANEOUS NIGHTLY
Status: DISCONTINUED | OUTPATIENT
Start: 2024-06-24 | End: 2024-06-24

## 2024-06-24 RX ORDER — INSULIN LISPRO 100 [IU]/ML
0-8 INJECTION, SOLUTION INTRAVENOUS; SUBCUTANEOUS
Status: DISCONTINUED | OUTPATIENT
Start: 2024-06-24 | End: 2024-06-24

## 2024-06-24 RX ORDER — INSULIN LISPRO 100 [IU]/ML
0-4 INJECTION, SOLUTION INTRAVENOUS; SUBCUTANEOUS NIGHTLY
Status: DISCONTINUED | OUTPATIENT
Start: 2024-06-24 | End: 2024-06-27 | Stop reason: HOSPADM

## 2024-06-24 RX ORDER — IBUPROFEN 600 MG/1
600 TABLET ORAL EVERY 6 HOURS PRN
Status: DISCONTINUED | OUTPATIENT
Start: 2024-06-24 | End: 2024-06-24

## 2024-06-24 RX ORDER — IPRATROPIUM BROMIDE AND ALBUTEROL SULFATE 2.5; .5 MG/3ML; MG/3ML
1 SOLUTION RESPIRATORY (INHALATION) EVERY 4 HOURS PRN
Status: DISCONTINUED | OUTPATIENT
Start: 2024-06-24 | End: 2024-06-25

## 2024-06-24 RX ORDER — PANTOPRAZOLE SODIUM 40 MG/1
40 TABLET, DELAYED RELEASE ORAL
Status: DISCONTINUED | OUTPATIENT
Start: 2024-06-25 | End: 2024-06-27 | Stop reason: HOSPADM

## 2024-06-24 RX ORDER — AZITHROMYCIN 250 MG/1
250 TABLET, FILM COATED ORAL DAILY
Status: DISCONTINUED | OUTPATIENT
Start: 2024-06-24 | End: 2024-06-27 | Stop reason: HOSPADM

## 2024-06-24 RX ORDER — INSULIN LISPRO 100 [IU]/ML
0-16 INJECTION, SOLUTION INTRAVENOUS; SUBCUTANEOUS
Status: DISCONTINUED | OUTPATIENT
Start: 2024-06-24 | End: 2024-06-27 | Stop reason: HOSPADM

## 2024-06-24 RX ORDER — INSULIN GLARGINE 100 [IU]/ML
5 INJECTION, SOLUTION SUBCUTANEOUS NIGHTLY
Status: DISCONTINUED | OUTPATIENT
Start: 2024-06-24 | End: 2024-06-25

## 2024-06-24 RX ORDER — OXYCODONE HYDROCHLORIDE AND ACETAMINOPHEN 5; 325 MG/1; MG/1
1 TABLET ORAL EVERY 8 HOURS PRN
Status: DISCONTINUED | OUTPATIENT
Start: 2024-06-24 | End: 2024-06-25

## 2024-06-24 RX ADMIN — IPRATROPIUM BROMIDE AND ALBUTEROL 1 PUFF: 20; 100 SPRAY, METERED RESPIRATORY (INHALATION) at 12:58

## 2024-06-24 RX ADMIN — INSULIN LISPRO 4 UNITS: 100 INJECTION, SOLUTION INTRAVENOUS; SUBCUTANEOUS at 21:53

## 2024-06-24 RX ADMIN — MOMETASONE FUROATE AND FORMOTEROL FUMARATE DIHYDRATE 2 PUFF: 200; 5 AEROSOL RESPIRATORY (INHALATION) at 21:45

## 2024-06-24 RX ADMIN — IPRATROPIUM BROMIDE AND ALBUTEROL 1 PUFF: 20; 100 SPRAY, METERED RESPIRATORY (INHALATION) at 21:44

## 2024-06-24 RX ADMIN — INSULIN LISPRO 8 UNITS: 100 INJECTION, SOLUTION INTRAVENOUS; SUBCUTANEOUS at 17:25

## 2024-06-24 RX ADMIN — ROSUVASTATIN CALCIUM 20 MG: 20 TABLET, COATED ORAL at 23:12

## 2024-06-24 RX ADMIN — ZOLPIDEM TARTRATE 5 MG: 5 TABLET ORAL at 23:12

## 2024-06-24 RX ADMIN — DEXAMETHASONE 6 MG: 4 TABLET ORAL at 07:54

## 2024-06-24 RX ADMIN — GUAIFENESIN 600 MG: 600 TABLET ORAL at 07:54

## 2024-06-24 RX ADMIN — INSULIN LISPRO 2 UNITS: 100 INJECTION, SOLUTION INTRAVENOUS; SUBCUTANEOUS at 11:57

## 2024-06-24 RX ADMIN — AZITHROMYCIN DIHYDRATE 250 MG: 250 TABLET, FILM COATED ORAL at 13:23

## 2024-06-24 RX ADMIN — LOSARTAN POTASSIUM 25 MG: 25 TABLET, FILM COATED ORAL at 07:54

## 2024-06-24 RX ADMIN — ENOXAPARIN SODIUM 40 MG: 100 INJECTION SUBCUTANEOUS at 08:00

## 2024-06-24 RX ADMIN — MOMETASONE FUROATE AND FORMOTEROL FUMARATE DIHYDRATE 2 PUFF: 200; 5 AEROSOL RESPIRATORY (INHALATION) at 05:38

## 2024-06-24 RX ADMIN — GUAIFENESIN AND CODEINE PHOSPHATE 5 ML: 100; 10 SOLUTION ORAL at 07:19

## 2024-06-24 RX ADMIN — WATER 40 MG: 1 INJECTION INTRAMUSCULAR; INTRAVENOUS; SUBCUTANEOUS at 13:23

## 2024-06-24 RX ADMIN — MOMETASONE FUROATE AND FORMOTEROL FUMARATE DIHYDRATE 2 PUFF: 200; 5 AEROSOL RESPIRATORY (INHALATION) at 09:32

## 2024-06-24 RX ADMIN — IPRATROPIUM BROMIDE AND ALBUTEROL 1 PUFF: 20; 100 SPRAY, METERED RESPIRATORY (INHALATION) at 05:37

## 2024-06-24 RX ADMIN — IPRATROPIUM BROMIDE AND ALBUTEROL 1 PUFF: 20; 100 SPRAY, METERED RESPIRATORY (INHALATION) at 09:32

## 2024-06-24 RX ADMIN — OXYCODONE HYDROCHLORIDE AND ACETAMINOPHEN 1 TABLET: 5; 325 TABLET ORAL at 23:11

## 2024-06-24 RX ADMIN — IPRATROPIUM BROMIDE AND ALBUTEROL 1 PUFF: 20; 100 SPRAY, METERED RESPIRATORY (INHALATION) at 16:20

## 2024-06-24 RX ADMIN — IPRATROPIUM BROMIDE AND ALBUTEROL 1 PUFF: 20; 100 SPRAY, METERED RESPIRATORY (INHALATION) at 23:43

## 2024-06-24 RX ADMIN — LEVOFLOXACIN 500 MG: 500 TABLET, FILM COATED ORAL at 07:54

## 2024-06-24 RX ADMIN — OXYCODONE HYDROCHLORIDE AND ACETAMINOPHEN 1 TABLET: 5; 325 TABLET ORAL at 15:12

## 2024-06-24 RX ADMIN — INSULIN GLARGINE 5 UNITS: 100 INJECTION, SOLUTION SUBCUTANEOUS at 21:52

## 2024-06-24 RX ADMIN — SODIUM CHLORIDE, PRESERVATIVE FREE 10 ML: 5 INJECTION INTRAVENOUS at 07:59

## 2024-06-24 RX ADMIN — GUAIFENESIN AND CODEINE PHOSPHATE 5 ML: 100; 10 SOLUTION ORAL at 21:59

## 2024-06-24 RX ADMIN — GUAIFENESIN AND CODEINE PHOSPHATE 5 ML: 100; 10 SOLUTION ORAL at 03:58

## 2024-06-24 RX ADMIN — SODIUM CHLORIDE, PRESERVATIVE FREE 10 ML: 5 INJECTION INTRAVENOUS at 21:59

## 2024-06-24 ASSESSMENT — PAIN DESCRIPTION - ONSET: ONSET: ON-GOING

## 2024-06-24 ASSESSMENT — PAIN DESCRIPTION - DESCRIPTORS
DESCRIPTORS: ACHING;PRESSURE
DESCRIPTORS: DISCOMFORT;PRESSURE
DESCRIPTORS: DISCOMFORT;PRESSURE
DESCRIPTORS: ACHING
DESCRIPTORS: DISCOMFORT;PRESSURE
DESCRIPTORS: DISCOMFORT;PRESSURE
DESCRIPTORS: ACHING;PRESSURE
DESCRIPTORS: ACHING;PRESSURE

## 2024-06-24 ASSESSMENT — PAIN SCALES - GENERAL
PAINLEVEL_OUTOF10: 5
PAINLEVEL_OUTOF10: 9
PAINLEVEL_OUTOF10: 8
PAINLEVEL_OUTOF10: 0
PAINLEVEL_OUTOF10: 9
PAINLEVEL_OUTOF10: 0
PAINLEVEL_OUTOF10: 5
PAINLEVEL_OUTOF10: 9
PAINLEVEL_OUTOF10: 0
PAINLEVEL_OUTOF10: 8
PAINLEVEL_OUTOF10: 4

## 2024-06-24 ASSESSMENT — PAIN DESCRIPTION - LOCATION
LOCATION: BACK

## 2024-06-24 ASSESSMENT — PAIN DESCRIPTION - FREQUENCY: FREQUENCY: CONTINUOUS

## 2024-06-24 ASSESSMENT — PAIN DESCRIPTION - ORIENTATION
ORIENTATION: MID;UPPER

## 2024-06-24 ASSESSMENT — PAIN - FUNCTIONAL ASSESSMENT

## 2024-06-24 ASSESSMENT — PAIN DESCRIPTION - PAIN TYPE
TYPE: ACUTE PAIN
TYPE: CHRONIC PAIN
TYPE: ACUTE PAIN
TYPE: ACUTE PAIN

## 2024-06-24 NOTE — CONSULTS
Pulmonary Medicine Consultation    CHIEF COMPLAINT / HPI:                The patient is a 64 y.o. female with significant past medical history of Asthma and COPD, DM, GERD presented with complaints of shortness of breath.  Patient is been feeling sick for about a week and said she got to the point where she was only living to walk couple steps to get her nebulizer and inhalers.  She is on Breztri as an outpatient and has been using it more frequently than prescribed this past week because she has had so much dyspnea.  She denies any fevers and did not realize she was COVID-positive until she came here.  Sounds like she may have had some family exposure that they discovered after the fact.  She is feeling a little bit better with the steroids and bronchodilators here but still far from baseline.    Past Medical History:      Diagnosis Date    Asthma     COPD (chronic obstructive pulmonary disease) (HCC)     Diabetes mellitus (HCC)     GERD (gastroesophageal reflux disease)       Past Surgical History:        Procedure Laterality Date    COLONOSCOPY      LAPAROSCOPY      OTHER SURGICAL HISTORY  1/9/2015    PLANTAR FASCIOTOMY RIGHT FOOT; STEROID INJECTION LEFT HEEL    OTHER SURGICAL HISTORY      patient states surgery for  stomach ulcer    PLANTAR FASCIA SURGERY Left 3/30/16    ENDOSCOPIC PLANTAR FASCIOTOMY LEFT FOOT    WISDOM TOOTH EXTRACTION         Social History:    TOBACCO:   reports that she has been smoking. She has a 15.0 pack-year smoking history. She does not have any smokeless tobacco history on file.  ETOH:   reports no history of alcohol use.      Family History:   No family history on file.    REVIEW OF SYSTEMS:    CONSTITUTIONAL: Negative for fevers and chills  HEENT: Negative for oropharyngeal exudate, post nasal drip, sinus pain / pressure, nasal congestion, ear pain  RESPIRATORY:  See HPI  CARDIOVASCULAR: Negative for chest pain, palpitations, edema  GASTROINTESTINAL: Negative for nausea,

## 2024-06-25 LAB
ANION GAP SERPL CALCULATED.3IONS-SCNC: 7 MMOL/L (ref 3–16)
BASOPHILS # BLD: 0 K/UL (ref 0–0.2)
BASOPHILS NFR BLD: 0 %
BUN SERPL-MCNC: 14 MG/DL (ref 7–20)
CALCIUM SERPL-MCNC: 9.2 MG/DL (ref 8.3–10.6)
CHLORIDE SERPL-SCNC: 100 MMOL/L (ref 99–110)
CO2 SERPL-SCNC: 33 MMOL/L (ref 21–32)
CREAT SERPL-MCNC: 0.7 MG/DL (ref 0.6–1.2)
D-DIMER QUANTITATIVE: 0.28 UG/ML FEU (ref 0–0.6)
DEPRECATED RDW RBC AUTO: 19 % (ref 12.4–15.4)
EOSINOPHIL # BLD: 0 K/UL (ref 0–0.6)
EOSINOPHIL NFR BLD: 0 %
EST. AVERAGE GLUCOSE BLD GHB EST-MCNC: 177.2 MG/DL
GFR SERPLBLD CREATININE-BSD FMLA CKD-EPI: >90 ML/MIN/{1.73_M2}
GLUCOSE BLD-MCNC: 212 MG/DL (ref 70–99)
GLUCOSE BLD-MCNC: 291 MG/DL (ref 70–99)
GLUCOSE BLD-MCNC: 303 MG/DL (ref 70–99)
GLUCOSE BLD-MCNC: 366 MG/DL (ref 70–99)
GLUCOSE SERPL-MCNC: 287 MG/DL (ref 70–99)
HBA1C MFR BLD: 7.8 %
HCT VFR BLD AUTO: 37.2 % (ref 36–48)
HGB BLD-MCNC: 11.7 G/DL (ref 12–16)
LYMPHOCYTES # BLD: 1.1 K/UL (ref 1–5.1)
LYMPHOCYTES NFR BLD: 14.5 %
MAGNESIUM SERPL-MCNC: 2 MG/DL (ref 1.8–2.4)
MCH RBC QN AUTO: 22.8 PG (ref 26–34)
MCHC RBC AUTO-ENTMCNC: 31.5 G/DL (ref 31–36)
MCV RBC AUTO: 72.6 FL (ref 80–100)
MONOCYTES # BLD: 0.3 K/UL (ref 0–1.3)
MONOCYTES NFR BLD: 4.2 %
NEUTROPHILS # BLD: 6.3 K/UL (ref 1.7–7.7)
NEUTROPHILS NFR BLD: 81.3 %
PERFORMED ON: ABNORMAL
PLATELET # BLD AUTO: 324 K/UL (ref 135–450)
PMV BLD AUTO: 8.1 FL (ref 5–10.5)
POTASSIUM SERPL-SCNC: 4.7 MMOL/L (ref 3.5–5.1)
RBC # BLD AUTO: 5.13 M/UL (ref 4–5.2)
SODIUM SERPL-SCNC: 140 MMOL/L (ref 136–145)
WBC # BLD AUTO: 7.7 K/UL (ref 4–11)

## 2024-06-25 PROCEDURE — 6370000000 HC RX 637 (ALT 250 FOR IP)

## 2024-06-25 PROCEDURE — 2580000003 HC RX 258

## 2024-06-25 PROCEDURE — 94640 AIRWAY INHALATION TREATMENT: CPT

## 2024-06-25 PROCEDURE — 6360000002 HC RX W HCPCS: Performed by: INTERNAL MEDICINE

## 2024-06-25 PROCEDURE — 6370000000 HC RX 637 (ALT 250 FOR IP): Performed by: INTERNAL MEDICINE

## 2024-06-25 PROCEDURE — 94761 N-INVAS EAR/PLS OXIMETRY MLT: CPT

## 2024-06-25 PROCEDURE — 2060000000 HC ICU INTERMEDIATE R&B

## 2024-06-25 PROCEDURE — 80048 BASIC METABOLIC PNL TOTAL CA: CPT

## 2024-06-25 PROCEDURE — 6360000002 HC RX W HCPCS

## 2024-06-25 PROCEDURE — 85025 COMPLETE CBC W/AUTO DIFF WBC: CPT

## 2024-06-25 PROCEDURE — 94669 MECHANICAL CHEST WALL OSCILL: CPT

## 2024-06-25 PROCEDURE — 99233 SBSQ HOSP IP/OBS HIGH 50: CPT | Performed by: INTERNAL MEDICINE

## 2024-06-25 PROCEDURE — 2580000003 HC RX 258: Performed by: INTERNAL MEDICINE

## 2024-06-25 PROCEDURE — 85379 FIBRIN DEGRADATION QUANT: CPT

## 2024-06-25 PROCEDURE — 36415 COLL VENOUS BLD VENIPUNCTURE: CPT

## 2024-06-25 PROCEDURE — 2700000000 HC OXYGEN THERAPY PER DAY

## 2024-06-25 PROCEDURE — 83735 ASSAY OF MAGNESIUM: CPT

## 2024-06-25 RX ORDER — SODIUM CHLORIDE FOR INHALATION 3 %
4 VIAL, NEBULIZER (ML) INHALATION 3 TIMES DAILY
Status: DISCONTINUED | OUTPATIENT
Start: 2024-06-25 | End: 2024-06-27 | Stop reason: HOSPADM

## 2024-06-25 RX ORDER — ALBUTEROL SULFATE 2.5 MG/3ML
2.5 SOLUTION RESPIRATORY (INHALATION) EVERY 4 HOURS PRN
Status: DISCONTINUED | OUTPATIENT
Start: 2024-06-25 | End: 2024-06-27 | Stop reason: HOSPADM

## 2024-06-25 RX ORDER — OXYCODONE HYDROCHLORIDE AND ACETAMINOPHEN 5; 325 MG/1; MG/1
1 TABLET ORAL EVERY 6 HOURS PRN
Status: DISCONTINUED | OUTPATIENT
Start: 2024-06-25 | End: 2024-06-27 | Stop reason: HOSPADM

## 2024-06-25 RX ORDER — ALBUTEROL SULFATE 2.5 MG/3ML
2.5 SOLUTION RESPIRATORY (INHALATION) 3 TIMES DAILY
Status: DISCONTINUED | OUTPATIENT
Start: 2024-06-25 | End: 2024-06-25

## 2024-06-25 RX ORDER — IPRATROPIUM BROMIDE AND ALBUTEROL SULFATE 2.5; .5 MG/3ML; MG/3ML
1 SOLUTION RESPIRATORY (INHALATION) 3 TIMES DAILY
Status: DISCONTINUED | OUTPATIENT
Start: 2024-06-25 | End: 2024-06-27 | Stop reason: HOSPADM

## 2024-06-25 RX ORDER — INSULIN GLARGINE 100 [IU]/ML
10 INJECTION, SOLUTION SUBCUTANEOUS NIGHTLY
Status: DISCONTINUED | OUTPATIENT
Start: 2024-06-25 | End: 2024-06-27 | Stop reason: HOSPADM

## 2024-06-25 RX ORDER — FLUCONAZOLE 150 MG/1
150 TABLET ORAL ONCE
Status: COMPLETED | OUTPATIENT
Start: 2024-06-25 | End: 2024-06-25

## 2024-06-25 RX ORDER — FAMOTIDINE 20 MG/1
20 TABLET, FILM COATED ORAL 2 TIMES DAILY
Status: DISCONTINUED | OUTPATIENT
Start: 2024-06-25 | End: 2024-06-27 | Stop reason: HOSPADM

## 2024-06-25 RX ADMIN — ROSUVASTATIN CALCIUM 20 MG: 20 TABLET, COATED ORAL at 20:04

## 2024-06-25 RX ADMIN — MOMETASONE FUROATE AND FORMOTEROL FUMARATE DIHYDRATE 2 PUFF: 200; 5 AEROSOL RESPIRATORY (INHALATION) at 20:59

## 2024-06-25 RX ADMIN — LOSARTAN POTASSIUM 25 MG: 25 TABLET, FILM COATED ORAL at 08:42

## 2024-06-25 RX ADMIN — PANTOPRAZOLE SODIUM 40 MG: 40 TABLET, DELAYED RELEASE ORAL at 07:12

## 2024-06-25 RX ADMIN — SODIUM CHLORIDE, PRESERVATIVE FREE 10 ML: 5 INJECTION INTRAVENOUS at 08:42

## 2024-06-25 RX ADMIN — GUAIFENESIN 600 MG: 600 TABLET ORAL at 20:04

## 2024-06-25 RX ADMIN — SODIUM CHLORIDE 30 MG/ML INHALATION SOLUTION 4 ML: 30 SOLUTION INHALANT at 20:59

## 2024-06-25 RX ADMIN — INSULIN LISPRO 16 UNITS: 100 INJECTION, SOLUTION INTRAVENOUS; SUBCUTANEOUS at 13:10

## 2024-06-25 RX ADMIN — SODIUM CHLORIDE 30 MG/ML INHALATION SOLUTION 4 ML: 30 SOLUTION INHALANT at 14:15

## 2024-06-25 RX ADMIN — FLUCONAZOLE 150 MG: 150 TABLET ORAL at 17:06

## 2024-06-25 RX ADMIN — FAMOTIDINE 20 MG: 20 TABLET, FILM COATED ORAL at 13:11

## 2024-06-25 RX ADMIN — DICLOFENAC SODIUM 4 G: 10 GEL TOPICAL at 23:11

## 2024-06-25 RX ADMIN — IPRATROPIUM BROMIDE AND ALBUTEROL 1 PUFF: 20; 100 SPRAY, METERED RESPIRATORY (INHALATION) at 08:10

## 2024-06-25 RX ADMIN — GUAIFENESIN AND CODEINE PHOSPHATE 5 ML: 100; 10 SOLUTION ORAL at 17:18

## 2024-06-25 RX ADMIN — MOMETASONE FUROATE AND FORMOTEROL FUMARATE DIHYDRATE 2 PUFF: 200; 5 AEROSOL RESPIRATORY (INHALATION) at 08:10

## 2024-06-25 RX ADMIN — INSULIN LISPRO 4 UNITS: 100 INJECTION, SOLUTION INTRAVENOUS; SUBCUTANEOUS at 17:06

## 2024-06-25 RX ADMIN — INSULIN LISPRO 12 UNITS: 100 INJECTION, SOLUTION INTRAVENOUS; SUBCUTANEOUS at 08:42

## 2024-06-25 RX ADMIN — INSULIN GLARGINE 10 UNITS: 100 INJECTION, SOLUTION SUBCUTANEOUS at 22:22

## 2024-06-25 RX ADMIN — GUAIFENESIN 600 MG: 600 TABLET ORAL at 08:42

## 2024-06-25 RX ADMIN — IPRATROPIUM BROMIDE AND ALBUTEROL SULFATE 1 DOSE: 2.5; .5 SOLUTION RESPIRATORY (INHALATION) at 14:15

## 2024-06-25 RX ADMIN — ENOXAPARIN SODIUM 40 MG: 100 INJECTION SUBCUTANEOUS at 08:42

## 2024-06-25 RX ADMIN — IPRATROPIUM BROMIDE AND ALBUTEROL SULFATE 1 DOSE: 2.5; .5 SOLUTION RESPIRATORY (INHALATION) at 20:58

## 2024-06-25 RX ADMIN — Medication: at 04:40

## 2024-06-25 RX ADMIN — ZOLPIDEM TARTRATE 5 MG: 5 TABLET ORAL at 22:22

## 2024-06-25 RX ADMIN — OXYCODONE HYDROCHLORIDE AND ACETAMINOPHEN 1 TABLET: 5; 325 TABLET ORAL at 20:04

## 2024-06-25 RX ADMIN — SODIUM CHLORIDE, PRESERVATIVE FREE 10 ML: 5 INJECTION INTRAVENOUS at 20:05

## 2024-06-25 RX ADMIN — NYSTATIN 500000 UNITS: 100000 SUSPENSION ORAL at 17:06

## 2024-06-25 RX ADMIN — WATER 40 MG: 1 INJECTION INTRAMUSCULAR; INTRAVENOUS; SUBCUTANEOUS at 00:56

## 2024-06-25 RX ADMIN — FAMOTIDINE 20 MG: 20 TABLET, FILM COATED ORAL at 20:04

## 2024-06-25 RX ADMIN — WATER 40 MG: 1 INJECTION INTRAMUSCULAR; INTRAVENOUS; SUBCUTANEOUS at 13:10

## 2024-06-25 RX ADMIN — AZITHROMYCIN DIHYDRATE 250 MG: 250 TABLET, FILM COATED ORAL at 08:42

## 2024-06-25 ASSESSMENT — PAIN SCALES - GENERAL
PAINLEVEL_OUTOF10: 0
PAINLEVEL_OUTOF10: 0
PAINLEVEL_OUTOF10: 7
PAINLEVEL_OUTOF10: 10
PAINLEVEL_OUTOF10: 0

## 2024-06-25 ASSESSMENT — PAIN DESCRIPTION - DESCRIPTORS
DESCRIPTORS: ACHING
DESCRIPTORS: ACHING

## 2024-06-25 ASSESSMENT — PAIN DESCRIPTION - LOCATION
LOCATION: JAW;TEETH
LOCATION: TEETH;FOOT

## 2024-06-25 ASSESSMENT — PAIN DESCRIPTION - ORIENTATION
ORIENTATION: RIGHT
ORIENTATION: RIGHT

## 2024-06-25 ASSESSMENT — PAIN DESCRIPTION - ONSET
ONSET: ON-GOING
ONSET: ON-GOING

## 2024-06-25 ASSESSMENT — PAIN - FUNCTIONAL ASSESSMENT
PAIN_FUNCTIONAL_ASSESSMENT: PREVENTS OR INTERFERES SOME ACTIVE ACTIVITIES AND ADLS
PAIN_FUNCTIONAL_ASSESSMENT: PREVENTS OR INTERFERES SOME ACTIVE ACTIVITIES AND ADLS

## 2024-06-25 ASSESSMENT — PAIN DESCRIPTION - FREQUENCY
FREQUENCY: CONTINUOUS
FREQUENCY: CONTINUOUS

## 2024-06-25 ASSESSMENT — PAIN DESCRIPTION - PAIN TYPE
TYPE: CHRONIC PAIN
TYPE: ACUTE PAIN

## 2024-06-25 NOTE — CARE COORDINATION
Case Management Assessment  Initial Evaluation    Date/Time of Evaluation: 6/25/2024 4:44 PM  Assessment Completed by: Caro Irving    If patient is discharged prior to next notation, then this note serves as note for discharge by case management.    Patient Name: Susanne Jason                   YOB: 1960  Diagnosis: COPD exacerbation (HCC) [J44.1]  COVID-19 [U07.1]                   Date / Time: 6/22/2024  3:54 PM    Patient Admission Status: Inpatient   Readmission Risk (Low < 19, Mod (19-27), High > 27): Readmission Risk Score: 13.2    Current PCP: No primary care provider on file.  PCP verified by CM? No    Chart Reviewed: Yes      History Provided by: Medical Record  Patient Orientation: Alert and Oriented    Patient Cognition: Alert    Hospitalization in the last 30 days (Readmission):  No    If yes, Readmission Assessment in CM Navigator will be completed.    Advance Directives:      Code Status: Full Code   Patient's Primary Decision Maker is: Legal Next of Kin      Discharge Planning:    Patient lives with: Family Members, Children Type of Home: House  Primary Care Giver: Self  Patient Support Systems include: Family Members   Current Financial resources: Medicaid  Current community resources: None ([facility] in past)  Current services prior to admission: Home Care            Current DME:              Type of Home Care services:  Nursing Services (dr visits)    ADLS  Prior functional level: Independent in ADLs/IADLs  Current functional level: Independent in ADLs/IADLs    PT AM-PAC:   /24  OT AM-PAC:   /24    Family can provide assistance at DC: Yes  Would you like Case Management to discuss the discharge plan with any other family members/significant others, and if so, who? No  Plans to Return to Present Housing: Yes  Other Identified Issues/Barriers to RETURNING to current housing: none  Potential Assistance needed at discharge: N/A            Potential DME:    Patient expects to

## 2024-06-26 LAB
ANION GAP SERPL CALCULATED.3IONS-SCNC: 8 MMOL/L (ref 3–16)
BASOPHILS # BLD: 0 K/UL (ref 0–0.2)
BASOPHILS NFR BLD: 0 %
BUN SERPL-MCNC: 14 MG/DL (ref 7–20)
CALCIUM SERPL-MCNC: 8.8 MG/DL (ref 8.3–10.6)
CHLORIDE SERPL-SCNC: 99 MMOL/L (ref 99–110)
CO2 SERPL-SCNC: 32 MMOL/L (ref 21–32)
CREAT SERPL-MCNC: 0.6 MG/DL (ref 0.6–1.2)
DEPRECATED RDW RBC AUTO: 18.9 % (ref 12.4–15.4)
EOSINOPHIL # BLD: 0 K/UL (ref 0–0.6)
EOSINOPHIL NFR BLD: 0 %
GFR SERPLBLD CREATININE-BSD FMLA CKD-EPI: >90 ML/MIN/{1.73_M2}
GLUCOSE BLD-MCNC: 258 MG/DL (ref 70–99)
GLUCOSE BLD-MCNC: 264 MG/DL (ref 70–99)
GLUCOSE BLD-MCNC: 267 MG/DL (ref 70–99)
GLUCOSE BLD-MCNC: 299 MG/DL (ref 70–99)
GLUCOSE SERPL-MCNC: 253 MG/DL (ref 70–99)
HCT VFR BLD AUTO: 35.8 % (ref 36–48)
HGB BLD-MCNC: 11.3 G/DL (ref 12–16)
LYMPHOCYTES # BLD: 1.6 K/UL (ref 1–5.1)
LYMPHOCYTES NFR BLD: 14.8 %
MAGNESIUM SERPL-MCNC: 2.1 MG/DL (ref 1.8–2.4)
MCH RBC QN AUTO: 22.7 PG (ref 26–34)
MCHC RBC AUTO-ENTMCNC: 31.5 G/DL (ref 31–36)
MCV RBC AUTO: 72.1 FL (ref 80–100)
MONOCYTES # BLD: 0.3 K/UL (ref 0–1.3)
MONOCYTES NFR BLD: 2.9 %
NEUTROPHILS # BLD: 8.7 K/UL (ref 1.7–7.7)
NEUTROPHILS NFR BLD: 82.3 %
PERFORMED ON: ABNORMAL
PLATELET # BLD AUTO: 308 K/UL (ref 135–450)
PMV BLD AUTO: 7.8 FL (ref 5–10.5)
POTASSIUM SERPL-SCNC: 4.5 MMOL/L (ref 3.5–5.1)
RBC # BLD AUTO: 4.97 M/UL (ref 4–5.2)
SODIUM SERPL-SCNC: 139 MMOL/L (ref 136–145)
WBC # BLD AUTO: 10.6 K/UL (ref 4–11)

## 2024-06-26 PROCEDURE — 6370000000 HC RX 637 (ALT 250 FOR IP)

## 2024-06-26 PROCEDURE — 83735 ASSAY OF MAGNESIUM: CPT

## 2024-06-26 PROCEDURE — 6360000002 HC RX W HCPCS

## 2024-06-26 PROCEDURE — 80048 BASIC METABOLIC PNL TOTAL CA: CPT

## 2024-06-26 PROCEDURE — 94669 MECHANICAL CHEST WALL OSCILL: CPT

## 2024-06-26 PROCEDURE — 99233 SBSQ HOSP IP/OBS HIGH 50: CPT | Performed by: INTERNAL MEDICINE

## 2024-06-26 PROCEDURE — 94761 N-INVAS EAR/PLS OXIMETRY MLT: CPT

## 2024-06-26 PROCEDURE — 85025 COMPLETE CBC W/AUTO DIFF WBC: CPT

## 2024-06-26 PROCEDURE — 94640 AIRWAY INHALATION TREATMENT: CPT

## 2024-06-26 PROCEDURE — 36415 COLL VENOUS BLD VENIPUNCTURE: CPT

## 2024-06-26 PROCEDURE — 2580000003 HC RX 258: Performed by: INTERNAL MEDICINE

## 2024-06-26 PROCEDURE — 6370000000 HC RX 637 (ALT 250 FOR IP): Performed by: INTERNAL MEDICINE

## 2024-06-26 PROCEDURE — 2580000003 HC RX 258

## 2024-06-26 PROCEDURE — 2060000000 HC ICU INTERMEDIATE R&B

## 2024-06-26 PROCEDURE — 2700000000 HC OXYGEN THERAPY PER DAY

## 2024-06-26 PROCEDURE — 94762 N-INVAS EAR/PLS OXIMTRY CONT: CPT

## 2024-06-26 PROCEDURE — 6360000002 HC RX W HCPCS: Performed by: INTERNAL MEDICINE

## 2024-06-26 RX ORDER — HYDROXYZINE HYDROCHLORIDE 10 MG/1
10 TABLET, FILM COATED ORAL ONCE
Status: COMPLETED | OUTPATIENT
Start: 2024-06-26 | End: 2024-06-26

## 2024-06-26 RX ADMIN — OXYCODONE HYDROCHLORIDE AND ACETAMINOPHEN 1 TABLET: 5; 325 TABLET ORAL at 20:40

## 2024-06-26 RX ADMIN — IPRATROPIUM BROMIDE AND ALBUTEROL SULFATE 1 DOSE: 2.5; .5 SOLUTION RESPIRATORY (INHALATION) at 13:39

## 2024-06-26 RX ADMIN — NYSTATIN 500000 UNITS: 100000 SUSPENSION ORAL at 17:21

## 2024-06-26 RX ADMIN — HYDROXYZINE HYDROCHLORIDE 10 MG: 10 TABLET, FILM COATED ORAL at 18:44

## 2024-06-26 RX ADMIN — MOMETASONE FUROATE AND FORMOTEROL FUMARATE DIHYDRATE 2 PUFF: 200; 5 AEROSOL RESPIRATORY (INHALATION) at 19:50

## 2024-06-26 RX ADMIN — DICLOFENAC SODIUM 4 G: 10 GEL TOPICAL at 17:19

## 2024-06-26 RX ADMIN — INSULIN LISPRO 8 UNITS: 100 INJECTION, SOLUTION INTRAVENOUS; SUBCUTANEOUS at 17:21

## 2024-06-26 RX ADMIN — FAMOTIDINE 20 MG: 20 TABLET, FILM COATED ORAL at 08:20

## 2024-06-26 RX ADMIN — NYSTATIN 500000 UNITS: 100000 SUSPENSION ORAL at 08:20

## 2024-06-26 RX ADMIN — INSULIN GLARGINE 10 UNITS: 100 INJECTION, SOLUTION SUBCUTANEOUS at 20:38

## 2024-06-26 RX ADMIN — MOMETASONE FUROATE AND FORMOTEROL FUMARATE DIHYDRATE 2 PUFF: 200; 5 AEROSOL RESPIRATORY (INHALATION) at 08:34

## 2024-06-26 RX ADMIN — IPRATROPIUM BROMIDE AND ALBUTEROL SULFATE 1 DOSE: 2.5; .5 SOLUTION RESPIRATORY (INHALATION) at 08:33

## 2024-06-26 RX ADMIN — GUAIFENESIN 600 MG: 600 TABLET ORAL at 08:20

## 2024-06-26 RX ADMIN — FAMOTIDINE 20 MG: 20 TABLET, FILM COATED ORAL at 20:38

## 2024-06-26 RX ADMIN — AZITHROMYCIN DIHYDRATE 250 MG: 250 TABLET, FILM COATED ORAL at 08:20

## 2024-06-26 RX ADMIN — INSULIN LISPRO 8 UNITS: 100 INJECTION, SOLUTION INTRAVENOUS; SUBCUTANEOUS at 12:35

## 2024-06-26 RX ADMIN — IPRATROPIUM BROMIDE AND ALBUTEROL SULFATE 1 DOSE: 2.5; .5 SOLUTION RESPIRATORY (INHALATION) at 19:49

## 2024-06-26 RX ADMIN — SODIUM CHLORIDE 30 MG/ML INHALATION SOLUTION 4 ML: 30 SOLUTION INHALANT at 13:39

## 2024-06-26 RX ADMIN — NYSTATIN 500000 UNITS: 100000 SUSPENSION ORAL at 12:35

## 2024-06-26 RX ADMIN — SODIUM CHLORIDE 30 MG/ML INHALATION SOLUTION 4 ML: 30 SOLUTION INHALANT at 19:50

## 2024-06-26 RX ADMIN — ENOXAPARIN SODIUM 40 MG: 100 INJECTION SUBCUTANEOUS at 08:10

## 2024-06-26 RX ADMIN — LOSARTAN POTASSIUM 25 MG: 25 TABLET, FILM COATED ORAL at 08:20

## 2024-06-26 RX ADMIN — SODIUM CHLORIDE, PRESERVATIVE FREE 10 ML: 5 INJECTION INTRAVENOUS at 20:38

## 2024-06-26 RX ADMIN — GUAIFENESIN 600 MG: 600 TABLET ORAL at 20:38

## 2024-06-26 RX ADMIN — ACETAMINOPHEN 650 MG: 325 TABLET ORAL at 12:37

## 2024-06-26 RX ADMIN — WATER 40 MG: 1 INJECTION INTRAMUSCULAR; INTRAVENOUS; SUBCUTANEOUS at 01:24

## 2024-06-26 RX ADMIN — WATER 40 MG: 1 INJECTION INTRAMUSCULAR; INTRAVENOUS; SUBCUTANEOUS at 12:35

## 2024-06-26 RX ADMIN — SODIUM CHLORIDE, PRESERVATIVE FREE 10 ML: 5 INJECTION INTRAVENOUS at 08:20

## 2024-06-26 RX ADMIN — INSULIN LISPRO 8 UNITS: 100 INJECTION, SOLUTION INTRAVENOUS; SUBCUTANEOUS at 08:10

## 2024-06-26 RX ADMIN — ZOLPIDEM TARTRATE 5 MG: 5 TABLET ORAL at 23:04

## 2024-06-26 RX ADMIN — ROSUVASTATIN CALCIUM 20 MG: 20 TABLET, COATED ORAL at 20:38

## 2024-06-26 ASSESSMENT — PAIN DESCRIPTION - PAIN TYPE
TYPE: ACUTE PAIN
TYPE: ACUTE PAIN

## 2024-06-26 ASSESSMENT — PAIN DESCRIPTION - LOCATION
LOCATION: LEG;FOOT
LOCATION: HEAD

## 2024-06-26 ASSESSMENT — PAIN SCALES - GENERAL
PAINLEVEL_OUTOF10: 7
PAINLEVEL_OUTOF10: 0
PAINLEVEL_OUTOF10: 3

## 2024-06-26 ASSESSMENT — PAIN DESCRIPTION - FREQUENCY
FREQUENCY: INTERMITTENT
FREQUENCY: INTERMITTENT

## 2024-06-26 ASSESSMENT — PAIN DESCRIPTION - DESCRIPTORS
DESCRIPTORS: ACHING
DESCRIPTORS: ACHING

## 2024-06-26 ASSESSMENT — PAIN DESCRIPTION - ORIENTATION
ORIENTATION: ANTERIOR;MID
ORIENTATION: RIGHT;LEFT

## 2024-06-26 ASSESSMENT — PAIN DESCRIPTION - ONSET
ONSET: SUDDEN
ONSET: ON-GOING

## 2024-06-27 VITALS
HEIGHT: 64 IN | WEIGHT: 157.41 LBS | RESPIRATION RATE: 18 BRPM | OXYGEN SATURATION: 95 % | HEART RATE: 96 BPM | BODY MASS INDEX: 26.87 KG/M2 | DIASTOLIC BLOOD PRESSURE: 80 MMHG | SYSTOLIC BLOOD PRESSURE: 143 MMHG | TEMPERATURE: 97.7 F

## 2024-06-27 LAB
ANION GAP SERPL CALCULATED.3IONS-SCNC: 11 MMOL/L (ref 3–16)
BASE EXCESS BLDA CALC-SCNC: 2.7 MMOL/L (ref -3–3)
BASOPHILS # BLD: 0 K/UL (ref 0–0.2)
BASOPHILS NFR BLD: 0 %
BUN SERPL-MCNC: 16 MG/DL (ref 7–20)
CALCIUM SERPL-MCNC: 8.8 MG/DL (ref 8.3–10.6)
CHLORIDE SERPL-SCNC: 98 MMOL/L (ref 99–110)
CO2 BLDA-SCNC: 31 MMOL/L
CO2 SERPL-SCNC: 27 MMOL/L (ref 21–32)
COHGB MFR BLDA: 1.4 % (ref 0–1.5)
CREAT SERPL-MCNC: 0.7 MG/DL (ref 0.6–1.2)
DEPRECATED RDW RBC AUTO: 18.9 % (ref 12.4–15.4)
EOSINOPHIL # BLD: 0 K/UL (ref 0–0.6)
EOSINOPHIL NFR BLD: 0 %
GFR SERPLBLD CREATININE-BSD FMLA CKD-EPI: >90 ML/MIN/{1.73_M2}
GLUCOSE BLD-MCNC: 177 MG/DL (ref 70–99)
GLUCOSE BLD-MCNC: 243 MG/DL (ref 70–99)
GLUCOSE BLD-MCNC: 245 MG/DL (ref 70–99)
GLUCOSE SERPL-MCNC: 300 MG/DL (ref 70–99)
HCO3 BLDA-SCNC: 30 MMOL/L (ref 21–29)
HCT VFR BLD AUTO: 37 % (ref 36–48)
HGB BLD-MCNC: 11.7 G/DL (ref 12–16)
HGB BLDA-MCNC: 12.3 G/DL
LYMPHOCYTES # BLD: 1.6 K/UL (ref 1–5.1)
LYMPHOCYTES NFR BLD: 11.6 %
MAGNESIUM SERPL-MCNC: 2.2 MG/DL (ref 1.8–2.4)
MCH RBC QN AUTO: 22.9 PG (ref 26–34)
MCHC RBC AUTO-ENTMCNC: 31.6 G/DL (ref 31–36)
MCV RBC AUTO: 72.4 FL (ref 80–100)
METHGB MFR BLDA: 0.1 % (ref 0–1.4)
MONOCYTES # BLD: 0.4 K/UL (ref 0–1.3)
MONOCYTES NFR BLD: 3 %
NEUTROPHILS # BLD: 12.1 K/UL (ref 1.7–7.7)
NEUTROPHILS NFR BLD: 85.4 %
PCO2 BLDA: 55.4 MMHG (ref 35–45)
PERFORMED ON: ABNORMAL
PH BLDA: 7.34 [PH] (ref 7.35–7.45)
PLATELET # BLD AUTO: 347 K/UL (ref 135–450)
PMV BLD AUTO: 8 FL (ref 5–10.5)
PO2 BLDA: 79.4 MMHG (ref 75–108)
POTASSIUM SERPL-SCNC: 4.7 MMOL/L (ref 3.5–5.1)
RBC # BLD AUTO: 5.1 M/UL (ref 4–5.2)
SAO2 % BLDA: 96 % (ref 93–100)
SODIUM SERPL-SCNC: 136 MMOL/L (ref 136–145)
WBC # BLD AUTO: 14.1 K/UL (ref 4–11)

## 2024-06-27 PROCEDURE — 6370000000 HC RX 637 (ALT 250 FOR IP): Performed by: INTERNAL MEDICINE

## 2024-06-27 PROCEDURE — 80048 BASIC METABOLIC PNL TOTAL CA: CPT

## 2024-06-27 PROCEDURE — 6370000000 HC RX 637 (ALT 250 FOR IP)

## 2024-06-27 PROCEDURE — 99232 SBSQ HOSP IP/OBS MODERATE 35: CPT | Performed by: INTERNAL MEDICINE

## 2024-06-27 PROCEDURE — 2700000000 HC OXYGEN THERAPY PER DAY

## 2024-06-27 PROCEDURE — 36600 WITHDRAWAL OF ARTERIAL BLOOD: CPT

## 2024-06-27 PROCEDURE — 36415 COLL VENOUS BLD VENIPUNCTURE: CPT

## 2024-06-27 PROCEDURE — 2580000003 HC RX 258: Performed by: INTERNAL MEDICINE

## 2024-06-27 PROCEDURE — 94669 MECHANICAL CHEST WALL OSCILL: CPT

## 2024-06-27 PROCEDURE — 94761 N-INVAS EAR/PLS OXIMETRY MLT: CPT

## 2024-06-27 PROCEDURE — 6360000002 HC RX W HCPCS

## 2024-06-27 PROCEDURE — 85025 COMPLETE CBC W/AUTO DIFF WBC: CPT

## 2024-06-27 PROCEDURE — 6360000002 HC RX W HCPCS: Performed by: INTERNAL MEDICINE

## 2024-06-27 PROCEDURE — 83735 ASSAY OF MAGNESIUM: CPT

## 2024-06-27 PROCEDURE — 82803 BLOOD GASES ANY COMBINATION: CPT

## 2024-06-27 PROCEDURE — 94640 AIRWAY INHALATION TREATMENT: CPT

## 2024-06-27 PROCEDURE — 2580000003 HC RX 258

## 2024-06-27 RX ORDER — PSEUDOEPHEDRINE HCL 30 MG
30 TABLET ORAL EVERY 6 HOURS PRN
Status: DISCONTINUED | OUTPATIENT
Start: 2024-06-27 | End: 2024-06-27 | Stop reason: HOSPADM

## 2024-06-27 RX ORDER — PREDNISONE 10 MG/1
TABLET ORAL
Qty: 32 TABLET | Refills: 0 | Status: SHIPPED | OUTPATIENT
Start: 2024-06-27

## 2024-06-27 RX ORDER — OXYCODONE HYDROCHLORIDE AND ACETAMINOPHEN 5; 325 MG/1; MG/1
1 TABLET ORAL EVERY 6 HOURS PRN
Qty: 12 TABLET | Refills: 0 | Status: SHIPPED | OUTPATIENT
Start: 2024-06-27 | End: 2024-06-30

## 2024-06-27 RX ADMIN — AZITHROMYCIN DIHYDRATE 250 MG: 250 TABLET, FILM COATED ORAL at 08:10

## 2024-06-27 RX ADMIN — SODIUM CHLORIDE 30 MG/ML INHALATION SOLUTION 4 ML: 30 SOLUTION INHALANT at 16:24

## 2024-06-27 RX ADMIN — INSULIN LISPRO 4 UNITS: 100 INJECTION, SOLUTION INTRAVENOUS; SUBCUTANEOUS at 10:06

## 2024-06-27 RX ADMIN — WATER 40 MG: 1 INJECTION INTRAMUSCULAR; INTRAVENOUS; SUBCUTANEOUS at 00:53

## 2024-06-27 RX ADMIN — GUAIFENESIN 600 MG: 600 TABLET ORAL at 08:10

## 2024-06-27 RX ADMIN — FAMOTIDINE 20 MG: 20 TABLET, FILM COATED ORAL at 08:10

## 2024-06-27 RX ADMIN — IPRATROPIUM BROMIDE AND ALBUTEROL SULFATE 1 DOSE: 2.5; .5 SOLUTION RESPIRATORY (INHALATION) at 16:24

## 2024-06-27 RX ADMIN — LOSARTAN POTASSIUM 25 MG: 25 TABLET, FILM COATED ORAL at 08:11

## 2024-06-27 RX ADMIN — NYSTATIN 500000 UNITS: 100000 SUSPENSION ORAL at 08:11

## 2024-06-27 RX ADMIN — PSEUDOEPHEDRINE HCL 30 MG: 30 TABLET, FILM COATED ORAL at 12:19

## 2024-06-27 RX ADMIN — SODIUM CHLORIDE, PRESERVATIVE FREE 10 ML: 5 INJECTION INTRAVENOUS at 08:11

## 2024-06-27 RX ADMIN — NYSTATIN 500000 UNITS: 100000 SUSPENSION ORAL at 16:55

## 2024-06-27 RX ADMIN — MOMETASONE FUROATE AND FORMOTEROL FUMARATE DIHYDRATE 2 PUFF: 200; 5 AEROSOL RESPIRATORY (INHALATION) at 09:37

## 2024-06-27 RX ADMIN — WATER 40 MG: 1 INJECTION INTRAMUSCULAR; INTRAVENOUS; SUBCUTANEOUS at 16:53

## 2024-06-27 RX ADMIN — IPRATROPIUM BROMIDE AND ALBUTEROL SULFATE 1 DOSE: 2.5; .5 SOLUTION RESPIRATORY (INHALATION) at 09:37

## 2024-06-27 RX ADMIN — ENOXAPARIN SODIUM 40 MG: 100 INJECTION SUBCUTANEOUS at 08:09

## 2024-06-27 RX ADMIN — OXYCODONE HYDROCHLORIDE AND ACETAMINOPHEN 1 TABLET: 5; 325 TABLET ORAL at 10:07

## 2024-06-27 RX ADMIN — SODIUM CHLORIDE 30 MG/ML INHALATION SOLUTION 4 ML: 30 SOLUTION INHALANT at 09:37

## 2024-06-27 ASSESSMENT — PAIN DESCRIPTION - ORIENTATION: ORIENTATION: ANTERIOR

## 2024-06-27 ASSESSMENT — PAIN SCALES - GENERAL
PAINLEVEL_OUTOF10: 5
PAINLEVEL_OUTOF10: 0
PAINLEVEL_OUTOF10: 1

## 2024-06-27 ASSESSMENT — PAIN DESCRIPTION - DESCRIPTORS: DESCRIPTORS: DISCOMFORT

## 2024-06-27 ASSESSMENT — PAIN DESCRIPTION - LOCATION: LOCATION: CHEST

## 2024-06-27 ASSESSMENT — PAIN - FUNCTIONAL ASSESSMENT: PAIN_FUNCTIONAL_ASSESSMENT: PREVENTS OR INTERFERES SOME ACTIVE ACTIVITIES AND ADLS

## 2024-06-27 NOTE — DISCHARGE SUMMARY
INTERNAL MEDICINE DEPARTMENT AT THE [facility]  DISCHARGE SUMMARY    Patient ID: Susanne Jason                                             Discharge Date: 6/27/2024   Patient's PCP: No primary care provider on file.                                          Discharge Physician: Marietta Batista DO   Admit Date: 6/22/2024   Admitting Physician: Colten Mast MD    PROBLEMS DURING HOSPITALIZATION:  Present on Admission:   COPD exacerbation (HCC)      DISCHARGE DIAGNOSES:    HPI:    Ms. Susanne Jason is a 64-year-old female with past medical history of COPD on baseline 3 L oxygen at home asthma hypertension type 2 diabetes who presented to the hospital on 22 June for progressive shortness of breath for the week prior.  The shortness of breath started a week ago and she had associated cough with productive yellow sputum for the prior 3 days.  She denied fevers.  She reported chills reported epigastric pain which she associated with coughing.  Patient denied sick contacts.     Patient lives at home with granddaughter and 2 grandkids and reported nobody had been sick.  Patient had difficulty walking from 1 room to another.  She reports using multiple pillows at night to sleep because of her shortness of breath.  She was recently placed on azithromycin Monday Wednesday Friday and prednisone taper on 14 June by her pulmonologist Dr. Landaverde which she was still taking.  Patient had been taking her nebulizers and increased frequency for the last week prior to coming to the hospital.     The following issues were addressed during hospitalization:  Patient was treated for an acute on chronic hypercapnic and hypoxemic respiratory failure secondary to COPD exacerbation superimposed with COVID infection.  Patient had received prednisone as well as azithromycin in the ED.  Patient was then put on Decadron 6 mg twice a day for 10 days and will continue this regimen to finish his course after discharge.  Patient was also

## 2024-06-27 NOTE — PLAN OF CARE
Problem: Discharge Planning  Goal: Discharge to home or other facility with appropriate resources  6/23/2024 0102 by Brynn Gomez RN  Outcome: Progressing     Problem: Pain  Goal: Verbalizes/displays adequate comfort level or baseline comfort level  6/23/2024 0102 by Brynn Gomez RN  Outcome: Progressing  Flowsheets (Taken 6/23/2024 0102)  Verbalizes/displays adequate comfort level or baseline comfort level:   Encourage patient to monitor pain and request assistance   Administer analgesics based on type and severity of pain and evaluate response   Assess pain using appropriate pain scale   Implement non-pharmacological measures as appropriate and evaluate response  Note: Pt c/o headache 3/10 after wearing bipap. Pt given tylenol prn. Pt verbalize relief on reassessment. No side effect reported.     Problem: Chronic Conditions and Co-morbidities  Goal: Patient's chronic conditions and co-morbidity symptoms are monitored and maintained or improved  6/23/2024 0102 by Brynn Gomez RN  Outcome: Progressing     Problem: Respiratory - Adult  Goal: Achieves optimal ventilation and oxygenation  6/23/2024 0102 by Brynn Gomez RN  Outcome: Progressing  Flowsheets (Taken 6/23/2024 0102)  Achieves optimal ventilation and oxygenation:   Assess for changes in respiratory status   Position to facilitate oxygenation and minimize respiratory effort   Assess for changes in mentation and behavior   Oxygen supplementation based on oxygen saturation or arterial blood gases  Note: Pt on 3L NC sating above 90. Vbg obtained. Pt is encourage to be on bipap and educate it benefits. Pt verbalize understanding.      Problem: Skin/Tissue Integrity - Adult  Goal: Skin integrity remains intact  6/23/2024 0102 by Brynn Gomez RN  Outcome: Progressing  Flowsheets (Taken 6/23/2024 0102)  Skin Integrity Remains Intact: Monitor for areas of redness and/or skin breakdown  Note: Pt skin assess as protocol and prn. No noted skin 
  Problem: Discharge Planning  Goal: Discharge to home or other facility with appropriate resources  6/23/2024 1310 by Snehal Rahman RN  Outcome: Progressing  Flowsheets (Taken 6/23/2024 0059 by Brynn Gomez RN)  Discharge to home or other facility with appropriate resources:   Identify barriers to discharge with patient and caregiver   Identify discharge learning needs (meds, wound care, etc)   Refer to discharge planning if patient needs post-hospital services based on physician order or complex needs related to functional status, cognitive ability or social support system   Arrange for needed discharge resources and transportation as appropriate  6/23/2024 0102 by Brynn Gomez RN  Outcome: Progressing  6/23/2024 0059 by Brynn Gomez RN  Outcome: Progressing  Flowsheets  Taken 6/23/2024 0059  Discharge to home or other facility with appropriate resources:   Identify barriers to discharge with patient and caregiver   Identify discharge learning needs (meds, wound care, etc)   Refer to discharge planning if patient needs post-hospital services based on physician order or complex needs related to functional status, cognitive ability or social support system   Arrange for needed discharge resources and transportation as appropriate  Taken 6/22/2024 2143  Discharge to home or other facility with appropriate resources:   Identify barriers to discharge with patient and caregiver   Arrange for needed discharge resources and transportation as appropriate   Identify discharge learning needs (meds, wound care, etc)     Problem: Pain  Goal: Verbalizes/displays adequate comfort level or baseline comfort level  6/23/2024 1310 by Snehal Rahman, RN  Outcome: Progressing  Flowsheets (Taken 6/23/2024 0102 by Brynn Gomez, RN)  Verbalizes/displays adequate comfort level or baseline comfort level:   Encourage patient to monitor pain and request assistance   Administer analgesics based on type and severity of 
  Problem: Discharge Planning  Goal: Discharge to home or other facility with appropriate resources  6/24/2024 0031 by Brynn Gomez RN  Outcome: Progressing  Flowsheets (Taken 6/23/2024 1935)  Discharge to home or other facility with appropriate resources:   Identify barriers to discharge with patient and caregiver   Arrange for needed discharge resources and transportation as appropriate   Identify discharge learning needs (meds, wound care, etc)     Problem: Pain  Goal: Verbalizes/displays adequate comfort level or baseline comfort level  6/24/2024 0031 by Brynn Gomez RN  Outcome: Progressing     Problem: Chronic Conditions and Co-morbidities  Goal: Patient's chronic conditions and co-morbidity symptoms are monitored and maintained or improved  Outcome: Progressing  Flowsheets (Taken 6/23/2024 1935)  Care Plan - Patient's Chronic Conditions and Co-Morbidity Symptoms are Monitored and Maintained or Improved:   Monitor and assess patient's chronic conditions and comorbid symptoms for stability, deterioration, or improvement   Collaborate with multidisciplinary team to address chronic and comorbid conditions and prevent exacerbation or deterioration   Update acute care plan with appropriate goals if chronic or comorbid symptoms are exacerbated and prevent overall improvement and discharge     Problem: Respiratory - Adult  Goal: Achieves optimal ventilation and oxygenation  6/24/2024 0031 by Brynn Gomez RN  Outcome: Progressing  Flowsheets (Taken 6/23/2024 1935)  Achieves optimal ventilation and oxygenation: Assess for changes in respiratory status     Problem: Skin/Tissue Integrity - Adult  Goal: Skin integrity remains intact  Outcome: Progressing  Flowsheets (Taken 6/23/2024 1935)  Skin Integrity Remains Intact: Monitor for areas of redness and/or skin breakdown     Problem: Safety - Adult  Goal: Free from fall injury  Outcome: Progressing  Flowsheets (Taken 6/23/2024 1935)  Free From Fall Injury: 
  Problem: Discharge Planning  Goal: Discharge to home or other facility with appropriate resources  6/24/2024 2300 by Lulu Rouse RN  Outcome: Progressing  Flowsheets (Taken 6/24/2024 2300)  Discharge to home or other facility with appropriate resources:   Identify barriers to discharge with patient and caregiver   Arrange for needed discharge resources and transportation as appropriate   Identify discharge learning needs (meds, wound care, etc)     Problem: Pain  Goal: Verbalizes/displays adequate comfort level or baseline comfort level  6/24/2024 2300 by Lulu Rouse RN  Outcome: Progressing  Flowsheets (Taken 6/24/2024 2300)  Verbalizes/displays adequate comfort level or baseline comfort level:   Encourage patient to monitor pain and request assistance   Assess pain using appropriate pain scale   Administer analgesics based on type and severity of pain and evaluate response   Implement non-pharmacological measures as appropriate and evaluate response   Consider cultural and social influences on pain and pain management   Notify Licensed Independent Practitioner if interventions unsuccessful or patient reports new pain     Problem: Respiratory - Adult  Goal: Achieves optimal ventilation and oxygenation  6/24/2024 2300 by Lulu Rouse RN  Outcome: Progressing  Flowsheets (Taken 6/24/2024 2300)  Achieves optimal ventilation and oxygenation:   Assess for changes in respiratory status   Assess for changes in mentation and behavior   Position to facilitate oxygenation and minimize respiratory effort   Oxygen supplementation based on oxygen saturation or arterial blood gases   Initiate smoking cessation protocol as indicated   Encourage broncho-pulmonary hygiene including cough, deep breathe, incentive spirometry   Assess and instruct to report shortness of breath or any respiratory difficulty   Respiratory therapy support as indicated   Assess the need for suctioning and aspirate as needed     
  Problem: Discharge Planning  Goal: Discharge to home or other facility with appropriate resources  6/25/2024 2254 by Lulu Rouse RN  Outcome: Progressing     Problem: Pain  Goal: Verbalizes/displays adequate comfort level or baseline comfort level  6/25/2024 2254 by Lulu Rouse RN  Outcome: Progressing  Flowsheets (Taken 6/25/2024 2254)  Verbalizes/displays adequate comfort level or baseline comfort level:   Assess pain using appropriate pain scale   Encourage patient to monitor pain and request assistance   Administer analgesics based on type and severity of pain and evaluate response   Implement non-pharmacological measures as appropriate and evaluate response   Consider cultural and social influences on pain and pain management   Notify Licensed Independent Practitioner if interventions unsuccessful or patient reports new pain     Problem: Respiratory - Adult  Goal: Achieves optimal ventilation and oxygenation  6/25/2024 2254 by Lulu Rouse RN  Outcome: Progressing  Flowsheets (Taken 6/25/2024 2254)  Achieves optimal ventilation and oxygenation:   Assess for changes in respiratory status   Assess for changes in mentation and behavior   Position to facilitate oxygenation and minimize respiratory effort   Oxygen supplementation based on oxygen saturation or arterial blood gases   Initiate smoking cessation protocol as indicated   Encourage broncho-pulmonary hygiene including cough, deep breathe, incentive spirometry   Assess the need for suctioning and aspirate as needed     
  Problem: Discharge Planning  Goal: Discharge to home or other facility with appropriate resources  6/26/2024 2114 by Lulu Rouse RN  Outcome: Progressing  Flowsheets (Taken 6/26/2024 2114)  Discharge to home or other facility with appropriate resources:   Identify barriers to discharge with patient and caregiver   Arrange for needed discharge resources and transportation as appropriate   Identify discharge learning needs (meds, wound care, etc)   Arrange for interpreters to assist at discharge as needed   Refer to discharge planning if patient needs post-hospital services based on physician order or complex needs related to functional status, cognitive ability or social support system     Problem: Pain  Goal: Verbalizes/displays adequate comfort level or baseline comfort level  6/26/2024 2114 by Lulu Rouse RN  Outcome: Progressing  Flowsheets (Taken 6/26/2024 2114)  Verbalizes/displays adequate comfort level or baseline comfort level:   Encourage patient to monitor pain and request assistance   Assess pain using appropriate pain scale   Consider cultural and social influences on pain and pain management   Administer analgesics based on type and severity of pain and evaluate response   Implement non-pharmacological measures as appropriate and evaluate response   Notify Licensed Independent Practitioner if interventions unsuccessful or patient reports new pain     Problem: Respiratory - Adult  Goal: Achieves optimal ventilation and oxygenation  6/26/2024 2114 by Lulu Rouse RN  Outcome: Progressing  Flowsheets (Taken 6/26/2024 2114)  Achieves optimal ventilation and oxygenation:   Assess for changes in respiratory status   Assess for changes in mentation and behavior   Position to facilitate oxygenation and minimize respiratory effort   Oxygen supplementation based on oxygen saturation or arterial blood gases   Initiate smoking cessation protocol as indicated     
  Problem: Discharge Planning  Goal: Discharge to home or other facility with appropriate resources  Outcome: Completed     Problem: Pain  Goal: Verbalizes/displays adequate comfort level or baseline comfort level  Outcome: Completed     Problem: Chronic Conditions and Co-morbidities  Goal: Patient's chronic conditions and co-morbidity symptoms are monitored and maintained or improved  Outcome: Completed     Problem: Respiratory - Adult  Goal: Achieves optimal ventilation and oxygenation  Outcome: Completed     Problem: Skin/Tissue Integrity - Adult  Goal: Skin integrity remains intact  Outcome: Completed     Problem: Safety - Adult  Goal: Free from fall injury  Outcome: Completed     
  Problem: Pain  Goal: Verbalizes/displays adequate comfort level or baseline comfort level  Outcome: Progressing     Problem: Respiratory - Adult  Goal: Achieves optimal ventilation and oxygenation  Outcome: Progressing     Problem: Safety - Adult  Goal: Free from fall injury  Outcome: Progressing     
  Problem: Pain  Goal: Verbalizes/displays adequate comfort level or baseline comfort level  Outcome: Progressing     Problem: Safety - Adult  Goal: Free from fall injury  Outcome: Progressing     Problem: Chronic Conditions and Co-morbidities  Goal: Patient's chronic conditions and co-morbidity symptoms are monitored and maintained or improved  Outcome: Progressing     
injury  Outcome: Progressing  Flowsheets (Taken 6/24/2024 2495)  Free From Fall Injury: Instruct family/caregiver on patient safety  Note: Pt free from falls and injury at this time.

## 2024-06-27 NOTE — DISCHARGE INSTRUCTIONS
Please follow-up with your PCP within 1 week  2. Continue to EZPAP  3. Please follow up Dr. Burroughs, Pulmonologist  4. Continue inhaler use as needed and prescribed at home  5. Please take prednisone taper and all other medications as prescribed

## 2024-06-27 NOTE — CARE COORDINATION
Case Management Assessment            Discharge Note                    Date / Time of Note: 6/27/2024 12:17 PM                  Discharge Note Completed by: Caro Irving    Patient Name: Susanne Jason   YOB: 1960  Diagnosis: COPD exacerbation (HCC) [J44.1]  COVID-19 [U07.1]   Date / Time: 6/22/2024  3:54 PM    Current PCP: No primary care provider on file.  Clinic patient: No    Hospitalization in the last 30 days: No       Advance Directives:  Code Status: Full Code  [place] DNR form completed and on chart: Not Indicated    Financial:  Payor: [facility] / Plan: [facility] MEDICAID / Product Type: *No Product type* /      Pharmacy:    [id number] [place], [place] - [address] - P [phone] - F [phone] [address] [address]  Phone: [phone] Fax: [phone]    [facility] [id number] [facility] [place] [address] -  [phone] - F [phone] [address] [address]  Phone: [phone] Fax: [phone]      Assistance purchasing medications?:    Assistance provided by Case Management: None at this time    Does patient want to participate in local refill/ meds to beds program?:      Meds To Beds General Rules:  1. Can ONLY be done Monday- Friday between 8:30am-5pm  2. Prescription(s) must be in pharmacy by 3pm to be filled same day  3.Copy of patient's insurance/ prescription drug card and patient face sheet must be sent along with the prescription(s)  4. Cost of Rx cannot be added to hospital bill. If financial assistance is needed, please contact unit  or ;  or  CANNOT provide pharmacy voucher for patients co-pays  5. Patients can then  the prescription on their way out of the hospital at discharge, or pharmacy can deliver to the bedside if staff is available. (payment due at time of pick-up or delivery - cash, check, or card accepted)     Able to

## 2024-06-27 NOTE — PROGRESS NOTES
Pulmonary Followup Note    CC: COPD exacerbation, COVID-19 infection  Subjective:  Patient breathing a bit better but still with significant cough and wheezing.  Still complains of chest soreness from all the coughing.    ROS:  Denies headache, nausea or chest pain.    24HR INTAKE/OUTPUT:    Intake/Output Summary (Last 24 hours) at 2024 0958  Last data filed at 2024 0921  Gross per 24 hour   Intake 1400 ml   Output 1800 ml   Net -400 ml          insulin glargine  10 Units SubCUTAneous Nightly    sodium chloride (Inhalant)  4 mL Nebulization TID    famotidine  20 mg Oral BID    ipratropium 0.5 mg-albuterol 2.5 mg  1 Dose Inhalation TID    nystatin  5 mL Oral 4x Daily    pantoprazole  40 mg Oral QAM AC    methylPREDNISolone  40 mg IntraVENous Q12H    azithromycin  250 mg Oral Daily    insulin lispro  0-16 Units SubCUTAneous TID WC    insulin lispro  0-4 Units SubCUTAneous Nightly    guaiFENesin  600 mg Oral BID    mometasone-formoterol  2 puff Inhalation BID RT    sodium chloride flush  5-40 mL IntraVENous 2 times per day    enoxaparin  40 mg SubCUTAneous Daily    rosuvastatin  20 mg Oral Nightly    losartan  25 mg Oral Daily           PHYSICAL EXAMINATION:  BP (!) 147/90   Pulse 81   Temp 97.6 °F (36.4 °C) (Oral)   Resp 16   Ht 1.626 m (5' 4\")   Wt 69.4 kg (153 lb)   LMP 2013   SpO2 95%   BMI 26.26 kg/m²   CURRENT PULSE OXIMETRY:  SpO2: 95 %  24HR PULSE OXIMETRY RANGE:  SpO2  Av %  Min: 91 %  Max: 100 %   O2 Flow Rate (L/min): 3 L/min      Gen: No distress. Speaking in full sentences with out accessory muscle use  HEENT: PERRL, EOMI, OP nl  Lung: Diffuse expiratory wheezing, no rhonchi  CV: RRR without M/R/R  Abd: +BS, soft, NT/ND  Ext: No edema.    DATA  CBC:   Recent Labs     24  0830 24  0719 24  0746   WBC 9.1 7.7 10.6   HGB 11.0* 11.7* 11.3*   HCT 35.0* 37.2 35.8*   MCV 72.8* 72.6* 72.1*    324 308       BMP:   Recent 
                                       Pulmonary Followup Note    CC: COPD exacerbation, COVID-19 infection  Subjective:  Patient breathing a bit better but still with significant productive cough and wheezing.    ROS:  Denies headache, nausea or chest pain.    24HR INTAKE/OUTPUT:    Intake/Output Summary (Last 24 hours) at 2024 1740  Last data filed at 2024 0445  Gross per 24 hour   Intake 480 ml   Output 1900 ml   Net -1420 ml        insulin glargine  10 Units SubCUTAneous Nightly    sodium chloride (Inhalant)  4 mL Nebulization TID    famotidine  20 mg Oral BID    ipratropium 0.5 mg-albuterol 2.5 mg  1 Dose Inhalation TID    nystatin  5 mL Oral 4x Daily    pantoprazole  40 mg Oral QAM AC    methylPREDNISolone  40 mg IntraVENous Q12H    azithromycin  250 mg Oral Daily    insulin lispro  0-16 Units SubCUTAneous TID WC    insulin lispro  0-4 Units SubCUTAneous Nightly    guaiFENesin  600 mg Oral BID    mometasone-formoterol  2 puff Inhalation BID RT    sodium chloride flush  5-40 mL IntraVENous 2 times per day    enoxaparin  40 mg SubCUTAneous Daily    rosuvastatin  20 mg Oral Nightly    losartan  25 mg Oral Daily           PHYSICAL EXAMINATION:  BP (!) 141/71   Pulse 74   Temp 98.2 °F (36.8 °C) (Oral)   Resp 22   Ht 1.626 m (5' 4\")   Wt 69.4 kg (153 lb)   LMP 2013   SpO2 100%   BMI 26.26 kg/m²   CURRENT PULSE OXIMETRY:  SpO2: 100 %  24HR PULSE OXIMETRY RANGE:  SpO2  Av.9 %  Min: 96 %  Max: 100 %   O2 Flow Rate (L/min): 3 L/min      Gen: No distress. Speaking in full sentences with out accessory muscle use  HEENT: PERRL, EOMI, OP nl  Lung: Diffuse expiratory wheezing, no rhonchi  CV: RRR without M/R/R  Abd: +BS, soft, NT/ND  Ext: No edema.    DATA  CBC:   Recent Labs     24  0908 24  0830 24  0719   WBC 6.3 9.1 7.7   HGB 11.3* 11.0* 11.7*   HCT 36.4 35.0* 37.2   MCV 72.1* 72.8* 72.6*    275 324     BMP:   Recent Labs     24  0830 24  0719    140 
                                       Pulmonary Followup Note    CC: COPD exacerbation, COVID-19 infection  Subjective:  Patient breathing feeling a lot better today.  Still complains of chest soreness from all the coughing and still struggles to get sputum up, but the EZPAP really helps and she wants to take it home with her.    ROS:  Denies headache, nausea or chest pain.    24HR INTAKE/OUTPUT:    Intake/Output Summary (Last 24 hours) at 2024 1321  Last data filed at 2024 0438  Gross per 24 hour   Intake 840 ml   Output 1975 ml   Net -1135 ml          insulin glargine  10 Units SubCUTAneous Nightly    sodium chloride (Inhalant)  4 mL Nebulization TID    famotidine  20 mg Oral BID    ipratropium 0.5 mg-albuterol 2.5 mg  1 Dose Inhalation TID    nystatin  5 mL Oral 4x Daily    pantoprazole  40 mg Oral QAM AC    methylPREDNISolone  40 mg IntraVENous Q12H    azithromycin  250 mg Oral Daily    insulin lispro  0-16 Units SubCUTAneous TID WC    insulin lispro  0-4 Units SubCUTAneous Nightly    guaiFENesin  600 mg Oral BID    mometasone-formoterol  2 puff Inhalation BID RT    sodium chloride flush  5-40 mL IntraVENous 2 times per day    enoxaparin  40 mg SubCUTAneous Daily    rosuvastatin  20 mg Oral Nightly    losartan  25 mg Oral Daily           PHYSICAL EXAMINATION:  BP (!) 153/83   Pulse 75   Temp 97.3 °F (36.3 °C) (Oral)   Resp 16   Ht 1.626 m (5' 4\")   Wt 71.4 kg (157 lb 6.5 oz)   LMP 2013   SpO2 95%   BMI 27.02 kg/m²   CURRENT PULSE OXIMETRY:  SpO2: 95 %  24HR PULSE OXIMETRY RANGE:  SpO2  Av.9 %  Min: 92 %  Max: 98 %   O2 Flow Rate (L/min): 3 L/min      Gen: No distress. Speaking in full sentences with out accessory muscle use  HEENT: PERRL, EOMI, OP nl  Lung: scattered expiratory wheezing, no rhonchi  CV: RRR without M/R/R  Abd: +BS, soft, NT/ND  Ext: No edema.    DATA  CBC:   Recent Labs     24  0719 24  0746 24  0628   WBC 7.7 10.6 14.1*   HGB 11.7* 11.3* 11.7* 
   NAME:  Susanne Jason  YOB: 1960  MEDICAL RECORD NUMBER:  [id number]       Nurse 1 eSignature: Electronically signed by Pat Jones RN on 6/27/24 at 7:49 PM EDT       Patient educated, handouts and meds to beds given. IV's removed, Telemetry box removed, pt transport to home with family vehicle.   No questions noted.      
4 Eyes Skin Assessment     NAME:  Susanne Jason  YOB: 1960  MEDICAL RECORD NUMBER:  [id number]    The patient is being assessed for  Admission    I agree that at least one RN has performed a thorough Head to Toe Skin Assessment on the patient. ALL assessment sites listed below have been assessed.      Areas assessed by both nurses:    Head, Face, Ears, Shoulders, Back, Chest, Arms, Elbows, Hands, Sacrum. Buttock, Coccyx, Ischium, Legs. Feet and Heels, and Under Medical Devices         Does the Patient have a Wound? No noted wound(s)       Severino Prevention initiated by RN: Yes  Wound Care Orders initiated by RN: No    Pressure Injury (Stage 3,4, Unstageable, DTI, NWPT, and Complex wounds) if present, place Wound referral order by RN under : No    New Ostomies, if present place, Ostomy referral order under : No     Nurse 1 eSignature: Electronically signed by Brynn Gomez RN on 6/23/24 at 1:08 AM EDT    **SHARE this note so that the co-signing nurse can place an eSignature**    Nurse 2 eSignature: Electronically signed by Harleen Asher RN on 6/22/24 at 9:52 PM EDT    
Dinner time insulin was not given by dayshift RN. Nighttime sugar is 350, MD informed, says to give 4 units per order paramters + scheduled lantus and recheck in 1 hr.  
Overnight pulse ox study completed and placed in pt chart.   
Patient does not want to wear bipap at this time. Patient is alert and oriented. RT and RN will closely monitor.  
Progress Note  PGY-1    Admit Date: 6/22/2024  Day: 1  Diet: ADULT DIET; Regular; 3 carb choices (45 gm/meal)    CC: Shortness of breath    Interval history:     Patient seen at bedside this a.m.. Blood glucose elevated to 353 last night, 4 units of lispro given and 5 units of Lantus. Repeat .  On physical exam, no accessory muscles used. However, patient still experiencing wheezing. Continues to be on 3 L O2, this is her baseline. Patient reports that she would like to have CPAP at home. Patient refused to wear BiPAP around 11:46 PM and did not wear it overnight.         Medications:     Scheduled Meds:   pantoprazole  40 mg Oral QAM AC    methylPREDNISolone  40 mg IntraVENous Q12H    azithromycin  250 mg Oral Daily    insulin glargine  5 Units SubCUTAneous Nightly    insulin lispro  0-16 Units SubCUTAneous TID WC    insulin lispro  0-4 Units SubCUTAneous Nightly    guaiFENesin  600 mg Oral BID    mometasone-formoterol  2 puff Inhalation BID RT    sodium chloride flush  5-40 mL IntraVENous 2 times per day    enoxaparin  40 mg SubCUTAneous Daily    albuterol-ipratropium  1 puff Inhalation Q4H WA RT    rosuvastatin  20 mg Oral Nightly    losartan  25 mg Oral Daily     Continuous Infusions:   dextrose      sodium chloride       PRN Meds:benzocaine, ipratropium 0.5 mg-albuterol 2.5 mg, oxyCODONE-acetaminophen, glucose, dextrose bolus **OR** dextrose bolus, glucagon (rDNA), dextrose, guaiFENesin-codeine, sodium chloride flush, sodium chloride, potassium chloride **OR** potassium alternative oral replacement **OR** potassium chloride, magnesium sulfate, ondansetron **OR** ondansetron, polyethylene glycol, acetaminophen **OR** acetaminophen, zolpidem    Objective:   Vitals:   T-max:  Patient Vitals for the past 8 hrs:   BP Temp Temp src Pulse Resp SpO2 Weight   06/25/24 0442 -- -- -- -- -- -- 69.4 kg (153 lb)   06/25/24 0424 (!) 145/91 97.8 °F (36.6 °C) Oral 85 18 96 % --         Intake/Output Summary (Last 24 
Progress Note  PGY-1    Admit Date: 6/22/2024  Day: 1  Diet: ADULT DIET; Regular; 4 carb choices (60 gm/meal); Low Fat/Low Chol/High Fiber/WILLOW    CC: Shortness of breath    Interval history:     Patient seen at bedside this a.m.. No acute overnight events. Mildly warm to touch. Continues to use Voltaren gel for right foot swelling and states that it is beneficial. Restarted Sudafed. Continues to be 3L O2. However, patient was satting well room air at 91%. Patient states she was feeling much better this morning. Did not want protonix so pepcid was ordered. Mentions that her vaginal yeast infection \"feels better\".        Medications:     Scheduled Meds:   insulin glargine  10 Units SubCUTAneous Nightly    sodium chloride (Inhalant)  4 mL Nebulization TID    famotidine  20 mg Oral BID    ipratropium 0.5 mg-albuterol 2.5 mg  1 Dose Inhalation TID    nystatin  5 mL Oral 4x Daily    pantoprazole  40 mg Oral QAM AC    methylPREDNISolone  40 mg IntraVENous Q12H    azithromycin  250 mg Oral Daily    insulin lispro  0-16 Units SubCUTAneous TID WC    insulin lispro  0-4 Units SubCUTAneous Nightly    guaiFENesin  600 mg Oral BID    mometasone-formoterol  2 puff Inhalation BID RT    sodium chloride flush  5-40 mL IntraVENous 2 times per day    enoxaparin  40 mg SubCUTAneous Daily    rosuvastatin  20 mg Oral Nightly    losartan  25 mg Oral Daily     Continuous Infusions:   dextrose      sodium chloride       PRN Meds:benzocaine, albuterol-ipratropium, albuterol, oxyCODONE-acetaminophen, glucose, dextrose bolus **OR** dextrose bolus, glucagon (rDNA), dextrose, sodium chloride flush, sodium chloride, potassium chloride **OR** potassium alternative oral replacement **OR** potassium chloride, magnesium sulfate, ondansetron **OR** ondansetron, polyethylene glycol, acetaminophen **OR** acetaminophen, zolpidem    Objective:   Vitals:   T-max:  Patient Vitals for the past 8 hrs:   BP Temp Temp src Pulse Resp SpO2 Weight   06/27/24 0620 
Pt mood irritable and labile throughout shift, this morning pt screaming into hallway for someone to give her inhalers, RT called to bedside to administer.  
Pt off of Bipap, c/o unable to breath and ask for bipap break. On 3L NC sating 99%. Dr. West notify.   
Pt placed on overnight pulse ox study on baseline O2 of 3L @ 0015.    Found pt off study @ 0420, unsure how long off. Placed back on pt finger and reminded to keep pulse ox on.    
Tried to obtain ABG, some difficulties occurred and was unable to obtain ABG  
Unable to perform overnight pulser oximetry study at this time. Patient is in moderate respiratory distress.   
(!) 147/90 97.5 °F (36.4 °C) Oral 88 18 91 % 69.4 kg (153 lb)   06/26/24 0200 -- -- -- 72 -- -- --         Intake/Output Summary (Last 24 hours) at 6/26/2024 0838  Last data filed at 6/26/2024 0457  Gross per 24 hour   Intake 1280 ml   Output 1800 ml   Net -520 ml         Review of Systems  As outlined above in HPI  Physical Exam  Constitutional:       General: She is not in acute distress.     Appearance: Normal appearance. She is not ill-appearing.   HENT:      Head: Normocephalic and atraumatic.   Cardiovascular:      Rate and Rhythm: Normal rate and regular rhythm.   Pulmonary:      Effort: Pulmonary effort is normal.      Breath sounds: Wheezing present. No rhonchi or rales.      Comments: End expiratory wheezes  Abdominal:      General: There is no distension.      Palpations: Abdomen is soft.      Tenderness: There is no abdominal tenderness. There is no guarding.   Musculoskeletal:      Right lower leg: No edema.      Left lower leg: No edema.   Skin:     General: Skin is warm and dry.   Neurological:      General: No focal deficit present.      Mental Status: She is alert and oriented to person, place, and time. Mental status is at baseline.         LABS:    CBC:   Recent Labs     06/24/24  0830 06/25/24  0719 06/26/24  0746   WBC 9.1 7.7 10.6   HGB 11.0* 11.7* 11.3*   HCT 35.0* 37.2 35.8*    324 308   MCV 72.8* 72.6* 72.1*       Renal:    Recent Labs     06/23/24  0908 06/24/24  0830 06/25/24  0719   NA  --  141 140   K  --  4.3 4.7   CL  --  102 100   CO2  --  33* 33*   BUN  --  12 14   CREATININE  --  0.7 0.7   GLUCOSE  --  110* 287*   CALCIUM  --  8.9 9.2   MG 1.80 1.90 2.00   ANIONGAP  --  6 7       Hepatic: No results for input(s): \"AST\", \"ALT\", \"BILITOT\", \"BILIDIR\", \"PROT\", \"LABALBU\", \"ALKPHOS\" in the last 72 hours.  Troponin: No results for input(s): \"TROPONINI\" in the last 72 hours.  BNP: No results for input(s): \"BNP\" in the last 72 hours.  Lipids: No results for input(s): \"CHOL\", \"HDL\" in 
HPI  Physical Exam  Constitutional:       General: She is not in acute distress.     Appearance: Normal appearance. She is not ill-appearing.   HENT:      Head: Normocephalic and atraumatic.   Cardiovascular:      Rate and Rhythm: Normal rate and regular rhythm.   Pulmonary:      Effort: Pulmonary effort is normal.      Breath sounds: Wheezing present. No rhonchi or rales.      Comments: End expiratory wheezes  Abdominal:      General: There is no distension.      Palpations: Abdomen is soft.      Tenderness: There is no abdominal tenderness. There is no guarding.   Musculoskeletal:      Right lower leg: No edema.      Left lower leg: No edema.   Skin:     General: Skin is warm and dry.   Neurological:      General: No focal deficit present.      Mental Status: She is alert and oriented to person, place, and time. Mental status is at baseline.         LABS:    CBC:   Recent Labs     06/22/24  1609   WBC 8.5   HGB 12.3   HCT 39.4      MCV 72.5*     Renal:    Recent Labs     06/22/24  1609      K 4.2      CO2 27   BUN 13   CREATININE 0.8   GLUCOSE 131*   CALCIUM 9.3   ANIONGAP 12     Hepatic: No results for input(s): \"AST\", \"ALT\", \"BILITOT\", \"BILIDIR\", \"PROT\", \"LABALBU\", \"ALKPHOS\" in the last 72 hours.  Troponin: No results for input(s): \"TROPONINI\" in the last 72 hours.  BNP: No results for input(s): \"BNP\" in the last 72 hours.  Lipids: No results for input(s): \"CHOL\", \"HDL\" in the last 72 hours.    Invalid input(s): \"LDLCALCU\", \"TRIGLYCERIDE\"  ABGs:  No results for input(s): \"PHART\", \"LMM9HQT\", \"PO2ART\", \"SGA0NYV\", \"BEART\", \"THGBART\", \"G3KDUBDS\", \"CZP7IVC\" in the last 72 hours.    INR: No results for input(s): \"INR\" in the last 72 hours.  Lactate: No results for input(s): \"LACTATE\" in the last 72 hours.  Cultures:  -----------------------------------------------------------------  RAD:   XR CHEST (2 VW)   Final Result   No acute cardiopulmonary abnormality.      Electronically signed by James 
data filed at 6/24/2024 0625  Gross per 24 hour   Intake 675 ml   Output 0 ml   Net 675 ml         Review of Systems  As outlined above in HPI  Physical Exam  Constitutional:       General: She is not in acute distress.     Appearance: Normal appearance. She is not ill-appearing.   HENT:      Head: Normocephalic and atraumatic.   Cardiovascular:      Rate and Rhythm: Normal rate and regular rhythm.   Pulmonary:      Effort: Pulmonary effort is normal.      Breath sounds: Wheezing present. No rhonchi or rales.      Comments: End expiratory wheezes  Abdominal:      General: There is no distension.      Palpations: Abdomen is soft.      Tenderness: There is no abdominal tenderness. There is no guarding.   Musculoskeletal:      Right lower leg: No edema.      Left lower leg: No edema.   Skin:     General: Skin is warm and dry.   Neurological:      General: No focal deficit present.      Mental Status: She is alert and oriented to person, place, and time. Mental status is at baseline.         LABS:    CBC:   Recent Labs     06/22/24  1609 06/23/24  0908   WBC 8.5 6.3   HGB 12.3 11.3*   HCT 39.4 36.4    307   MCV 72.5* 72.1*       Renal:    Recent Labs     06/22/24  1609 06/23/24  0908     --    K 4.2  --      --    CO2 27  --    BUN 13  --    CREATININE 0.8  --    GLUCOSE 131*  --    CALCIUM 9.3  --    MG  --  1.80   ANIONGAP 12  --        Hepatic: No results for input(s): \"AST\", \"ALT\", \"BILITOT\", \"BILIDIR\", \"PROT\", \"LABALBU\", \"ALKPHOS\" in the last 72 hours.  Troponin: No results for input(s): \"TROPONINI\" in the last 72 hours.  BNP: No results for input(s): \"BNP\" in the last 72 hours.  Lipids: No results for input(s): \"CHOL\", \"HDL\" in the last 72 hours.    Invalid input(s): \"LDLCALCU\", \"TRIGLYCERIDE\"  ABGs:  No results for input(s): \"PHART\", \"IAN5XSM\", \"PO2ART\", \"SVW6UEZ\", \"BEART\", \"THGBART\", \"J7KWQPEZ\", \"AFZ2JPA\" in the last 72 hours.    INR: No results for input(s): \"INR\" in the last 72

## 2024-06-29 LAB
EKG ATRIAL RATE: 78 BPM
EKG DIAGNOSIS: NORMAL
EKG P AXIS: 79 DEGREES
EKG P-R INTERVAL: 136 MS
EKG Q-T INTERVAL: 388 MS
EKG QRS DURATION: 74 MS
EKG QTC CALCULATION (BAZETT): 442 MS
EKG R AXIS: 55 DEGREES
EKG T AXIS: 169 DEGREES
EKG VENTRICULAR RATE: 78 BPM

## 2024-08-24 ENCOUNTER — APPOINTMENT (OUTPATIENT)
Dept: GENERAL RADIOLOGY | Age: 64
End: 2024-08-24
Payer: COMMERCIAL

## 2024-08-24 ENCOUNTER — HOSPITAL ENCOUNTER (EMERGENCY)
Age: 64
Discharge: HOME | End: 2024-08-24
Attending: STUDENT IN AN ORGANIZED HEALTH CARE EDUCATION/TRAINING PROGRAM
Payer: COMMERCIAL

## 2024-08-24 VITALS
HEART RATE: 96 BPM | TEMPERATURE: 98.7 F | BODY MASS INDEX: 29.74 KG/M2 | HEIGHT: 61 IN | RESPIRATION RATE: 18 BRPM | DIASTOLIC BLOOD PRESSURE: 84 MMHG | OXYGEN SATURATION: 100 % | SYSTOLIC BLOOD PRESSURE: 127 MMHG

## 2024-08-24 DIAGNOSIS — E11.621 DIABETIC ULCER OF LEFT HEEL ASSOCIATED WITH TYPE 2 DIABETES MELLITUS, LIMITED TO BREAKDOWN OF SKIN (HCC): Primary | ICD-10-CM

## 2024-08-24 DIAGNOSIS — L97.421 DIABETIC ULCER OF LEFT HEEL ASSOCIATED WITH TYPE 2 DIABETES MELLITUS, LIMITED TO BREAKDOWN OF SKIN (HCC): Primary | ICD-10-CM

## 2024-08-24 LAB
ANION GAP SERPL CALCULATED.3IONS-SCNC: 10 MMOL/L (ref 3–16)
BASOPHILS # BLD: 0 K/UL (ref 0–0.2)
BASOPHILS NFR BLD: 0.5 %
BUN SERPL-MCNC: 14 MG/DL (ref 7–20)
CALCIUM SERPL-MCNC: 9.2 MG/DL (ref 8.3–10.6)
CHLORIDE SERPL-SCNC: 105 MMOL/L (ref 99–110)
CO2 SERPL-SCNC: 26 MMOL/L (ref 21–32)
CREAT SERPL-MCNC: 0.8 MG/DL (ref 0.6–1.2)
CRP SERPL-MCNC: 5.2 MG/L (ref 0–5.1)
DEPRECATED RDW RBC AUTO: 19.2 % (ref 12.4–15.4)
EOSINOPHIL # BLD: 0.1 K/UL (ref 0–0.6)
EOSINOPHIL NFR BLD: 1 %
ERYTHROCYTE [SEDIMENTATION RATE] IN BLOOD BY WESTERGREN METHOD: 5 MM/HR (ref 0–30)
GFR SERPLBLD CREATININE-BSD FMLA CKD-EPI: 82 ML/MIN/{1.73_M2}
GLUCOSE SERPL-MCNC: 157 MG/DL (ref 70–99)
HCT VFR BLD AUTO: 37.4 % (ref 36–48)
HGB BLD-MCNC: 11.7 G/DL (ref 12–16)
LYMPHOCYTES # BLD: 3.8 K/UL (ref 1–5.1)
LYMPHOCYTES NFR BLD: 41.1 %
MCH RBC QN AUTO: 23.4 PG (ref 26–34)
MCHC RBC AUTO-ENTMCNC: 31.4 G/DL (ref 31–36)
MCV RBC AUTO: 74.7 FL (ref 80–100)
MONOCYTES # BLD: 0.7 K/UL (ref 0–1.3)
MONOCYTES NFR BLD: 8 %
NEUTROPHILS # BLD: 4.6 K/UL (ref 1.7–7.7)
NEUTROPHILS NFR BLD: 49.4 %
PLATELET # BLD AUTO: 321 K/UL (ref 135–450)
PMV BLD AUTO: 8 FL (ref 5–10.5)
POTASSIUM SERPL-SCNC: 5 MMOL/L (ref 3.5–5.1)
RBC # BLD AUTO: 5.01 M/UL (ref 4–5.2)
SODIUM SERPL-SCNC: 141 MMOL/L (ref 136–145)
WBC # BLD AUTO: 9.3 K/UL (ref 4–11)

## 2024-08-24 PROCEDURE — 87070 CULTURE OTHR SPECIMN AEROBIC: CPT

## 2024-08-24 PROCEDURE — 73610 X-RAY EXAM OF ANKLE: CPT

## 2024-08-24 PROCEDURE — 87205 SMEAR GRAM STAIN: CPT

## 2024-08-24 PROCEDURE — 87077 CULTURE AEROBIC IDENTIFY: CPT

## 2024-08-24 PROCEDURE — 87186 SC STD MICRODIL/AGAR DIL: CPT

## 2024-08-24 PROCEDURE — 6370000000 HC RX 637 (ALT 250 FOR IP): Performed by: PHYSICIAN ASSISTANT

## 2024-08-24 PROCEDURE — 99284 EMERGENCY DEPT VISIT MOD MDM: CPT

## 2024-08-24 PROCEDURE — 2500000003 HC RX 250 WO HCPCS

## 2024-08-24 PROCEDURE — 86140 C-REACTIVE PROTEIN: CPT

## 2024-08-24 PROCEDURE — 87075 CULTR BACTERIA EXCEPT BLOOD: CPT

## 2024-08-24 PROCEDURE — 85652 RBC SED RATE AUTOMATED: CPT

## 2024-08-24 PROCEDURE — 36415 COLL VENOUS BLD VENIPUNCTURE: CPT

## 2024-08-24 PROCEDURE — 87185 SC STD ENZYME DETCJ PER NZM: CPT

## 2024-08-24 PROCEDURE — 80048 BASIC METABOLIC PNL TOTAL CA: CPT

## 2024-08-24 PROCEDURE — 85025 COMPLETE CBC W/AUTO DIFF WBC: CPT

## 2024-08-24 PROCEDURE — 87076 CULTURE ANAEROBE IDENT EACH: CPT

## 2024-08-24 RX ORDER — OXYCODONE AND ACETAMINOPHEN 5; 325 MG/1; MG/1
1 TABLET ORAL EVERY 8 HOURS PRN
Qty: 20 TABLET | Refills: 0 | Status: SHIPPED | OUTPATIENT
Start: 2024-08-24 | End: 2024-08-24

## 2024-08-24 RX ORDER — OXYCODONE HYDROCHLORIDE 5 MG/1
5 TABLET ORAL ONCE
Status: COMPLETED | OUTPATIENT
Start: 2024-08-24 | End: 2024-08-24

## 2024-08-24 RX ORDER — DOXYCYCLINE HYCLATE 100 MG
100 TABLET ORAL 2 TIMES DAILY
Qty: 20 TABLET | Refills: 0 | Status: SHIPPED | OUTPATIENT
Start: 2024-08-24 | End: 2024-08-24

## 2024-08-24 RX ORDER — LIDOCAINE HYDROCHLORIDE 10 MG/ML
10 INJECTION, SOLUTION INFILTRATION; PERINEURAL ONCE
Status: COMPLETED | OUTPATIENT
Start: 2024-08-24 | End: 2024-08-24

## 2024-08-24 RX ORDER — OXYCODONE AND ACETAMINOPHEN 5; 325 MG/1; MG/1
1 TABLET ORAL EVERY 8 HOURS PRN
Qty: 15 TABLET | Refills: 0 | Status: SHIPPED | OUTPATIENT
Start: 2024-08-24 | End: 2024-08-29

## 2024-08-24 RX ORDER — DOXYCYCLINE HYCLATE 100 MG
100 TABLET ORAL 2 TIMES DAILY
Qty: 20 TABLET | Refills: 0 | Status: SHIPPED | OUTPATIENT
Start: 2024-08-24 | End: 2024-09-03

## 2024-08-24 RX ORDER — LIDOCAINE HYDROCHLORIDE 10 MG/ML
5 INJECTION, SOLUTION INFILTRATION; PERINEURAL ONCE
Status: COMPLETED | OUTPATIENT
Start: 2024-08-24 | End: 2024-08-24

## 2024-08-24 RX ORDER — DOXYCYCLINE 100 MG/1
100 CAPSULE ORAL EVERY 12 HOURS SCHEDULED
Status: DISCONTINUED | OUTPATIENT
Start: 2024-08-24 | End: 2024-08-24

## 2024-08-24 RX ORDER — OXYCODONE AND ACETAMINOPHEN 5; 325 MG/1; MG/1
1 TABLET ORAL EVERY 8 HOURS PRN
Status: DISCONTINUED | OUTPATIENT
Start: 2024-08-24 | End: 2024-08-24

## 2024-08-24 RX ADMIN — LIDOCAINE HYDROCHLORIDE 5 ML: 10 INJECTION, SOLUTION INFILTRATION; PERINEURAL at 18:16

## 2024-08-24 RX ADMIN — OXYCODONE HYDROCHLORIDE 5 MG: 5 TABLET ORAL at 16:15

## 2024-08-24 RX ADMIN — LIDOCAINE HYDROCHLORIDE 10 ML: 10 INJECTION, SOLUTION INFILTRATION; PERINEURAL at 18:19

## 2024-08-24 ASSESSMENT — PAIN DESCRIPTION - PAIN TYPE: TYPE: ACUTE PAIN

## 2024-08-24 ASSESSMENT — LIFESTYLE VARIABLES
HOW MANY STANDARD DRINKS CONTAINING ALCOHOL DO YOU HAVE ON A TYPICAL DAY: 1 OR 2
HOW OFTEN DO YOU HAVE A DRINK CONTAINING ALCOHOL: MONTHLY OR LESS

## 2024-08-24 ASSESSMENT — PAIN DESCRIPTION - DESCRIPTORS: DESCRIPTORS: DISCOMFORT

## 2024-08-24 ASSESSMENT — ENCOUNTER SYMPTOMS
BACK PAIN: 0
CONSTIPATION: 0
SHORTNESS OF BREATH: 0
ABDOMINAL PAIN: 0
DIARRHEA: 0
VOMITING: 0
NAUSEA: 0

## 2024-08-24 ASSESSMENT — PAIN DESCRIPTION - FREQUENCY: FREQUENCY: CONTINUOUS

## 2024-08-24 ASSESSMENT — PAIN DESCRIPTION - ORIENTATION: ORIENTATION: LEFT

## 2024-08-24 ASSESSMENT — PAIN - FUNCTIONAL ASSESSMENT
PAIN_FUNCTIONAL_ASSESSMENT: 0-10
PAIN_FUNCTIONAL_ASSESSMENT: PREVENTS OR INTERFERES SOME ACTIVE ACTIVITIES AND ADLS

## 2024-08-24 ASSESSMENT — PAIN DESCRIPTION - ONSET: ONSET: ON-GOING

## 2024-08-24 ASSESSMENT — PAIN DESCRIPTION - LOCATION: LOCATION: FOOT

## 2024-08-24 ASSESSMENT — PAIN SCALES - GENERAL: PAINLEVEL_OUTOF10: 10

## 2024-08-24 NOTE — DISCHARGE INSTRUCTIONS
Follow-up with the podiatry clinic, numbers above on Friday.    Take antibiotic twice a day for 10 days as recommended by podiatry.    You may take pain medication for severe pain only.  Please be cautious as it may make you sleepy, do not drive to carry small children or operate heavy machinery until you know how to the

## 2024-08-24 NOTE — ED PROVIDER NOTES
THE [facility]  EMERGENCY DEPARTMENT ENCOUNTER          PHYSICIAN ASSISTANT NOTE     Date of evaluation: 8/24/2024    Chief Complaint     Foot Burn (Pt dropped her coffee on her L foot approx a week ago and now believe that it is infected. C/o pain that is not relieved with OTC meds.)    History of Present Illness     Susanne Jason is a 64 y.o. female who presents to the emergency department with a complaint of foot burn that occurred 3 weeks ago.  The patient states that she dropped a cup of [employer] coffee on her foot which caused a burn to the left medial malleolus.  The wound blistered up for about 1 week and blister popped and the patient poured hydrogen peroxide over the wound which caused extensive pain.  Patient has had worsening pain, redness, yellow pus coming from the blister for the past 1 week now.  She states that she can barely walk due to the pain.  No fevers.  She is a diabetic and is very concerned that she has an infection.  No chest pain, shortness of breath, nausea/vomiting/diarrhea. She has not been seen for this before.    ASSESSMENT / PLAN  (MEDICAL DECISION MAKING)     INITIAL VITALS: BP: (!) 128/92, Temp: 98.7 °F (37.1 °C), Pulse: (!) 106, Respirations: 18, SpO2: 95 %    Susanne Jason is a 64 y.o. female with mild tachycardia otherwise normal vital signs who presents with left foot infection.  On exam patient has erythema, swelling and warmth to the overlying the left medial malleolus where there is a burn that is actively draining yellow pus.  Patient can dorsiflex and plantarflex minimally limited due to pain.  DP pulses are 2+.  Patient was given oral oxycodone for pain.  CBC without leukocytosis.  ESR and CRP without elevation.  X-ray of the left ankle shows hindfoot soft tissue edema without acute osseous findings.  I consulted podiatry and the recommendations are pending.     Podiatry plan:    Full thickness ulceration 2/2 a burn sustained three weeks ago. All labs and

## 2024-08-24 NOTE — ED PROVIDER NOTES
ED Attending Attestation Note     Date of evaluation: 8/24/2024    This patient was seen by the advanced practice provider.  I have seen and examined the patient, agree with the workup, evaluation, management and diagnosis. The care plan has been discussed.  My assessment reveals a woman with a burn a few weeks ago to her right foot from hot coffee. She later unroofed a blister. Now she has developed pain at the site. She is a  On exam,       Vitals:    08/24/24 1533 08/24/24 1600 08/24/24 1630   BP: (!) 128/92 (!) 135/93 138/88   Pulse: (!) 106 96    Resp: 18     Temp: 98.7 °F (37.1 °C)     TempSrc: Oral     SpO2: 95% 99% 100%   Height: 1.549 m (5' 1\")         General:  Well appearing. No acute distress.  Non-toxic appearing     Eyes:  Pupils equally round . No discharge from eyes.    ENT:  No discharge from nose. OP clear.   Neck:  Supple. Trachea midline.    Pulmonary:   Non-labored breathing.    Symmetric chest wall excursion   Cardiac:  Regular rhythm. Normal  rate.   Abdomen:   . Non-distended.     Musculoskeletal:  No long bone deformity.  No  wrist deformity.  Vascular:  Extremities warm and perfused.   At left ankle is an area, about the size of a half-dollar coin irregularly shaped but well-demarcated, that has granulation tissue and a small area of overlying resolving blister. There is no surroudning erythema,edema, or warmth. NO obvious current purulent draiange. No focal area of fluctuance.  Skin:  No rash. Warm.    Neuro: Alert and conversant. CN II-XII grossly intact. Speech and mentation normal.    VOLODYMYR  Sensation grossly intact to light touch.      Extremities:  No peripheral edema. LE symmetric.    Impression is of a resolving burn. No definite evidence of active infection. Minimal CRP elevation but ESR not elvated. No leukocytosis. XR with soft tissue findings. Will discuss with podaitry     Van Antoine MD  08/24/24 3194
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
## 2024-08-25 LAB
BACTERIA SPEC AEROBE CULT: NORMAL
BACTERIA SPEC ANAEROBE CULT: NORMAL
GRAM STN SPEC: NORMAL

## 2024-08-28 LAB
BACTERIA SPEC AEROBE CULT: ABNORMAL
BACTERIA SPEC ANAEROBE CULT: ABNORMAL
GRAM STN SPEC: ABNORMAL
ORGANISM: ABNORMAL

## 2024-08-30 ENCOUNTER — OFFICE VISIT (OUTPATIENT)
Dept: INTERNAL MEDICINE CLINIC | Age: 64
End: 2024-08-30
Payer: COMMERCIAL

## 2024-08-30 DIAGNOSIS — Z79.4 TYPE 2 DIABETES MELLITUS WITHOUT COMPLICATION, WITH LONG-TERM CURRENT USE OF INSULIN (HCC): ICD-10-CM

## 2024-08-30 DIAGNOSIS — E11.9 TYPE 2 DIABETES MELLITUS WITHOUT COMPLICATION, WITH LONG-TERM CURRENT USE OF INSULIN (HCC): ICD-10-CM

## 2024-08-30 DIAGNOSIS — G89.21: Primary | ICD-10-CM

## 2024-08-30 PROCEDURE — 99213 OFFICE O/P EST LOW 20 MIN: CPT

## 2024-08-30 PROCEDURE — 11042 DBRDMT SUBQ TIS 1ST 20SQCM/<: CPT

## 2024-08-30 RX ORDER — OXYCODONE AND ACETAMINOPHEN 5; 325 MG/1; MG/1
1 TABLET ORAL EVERY 8 HOURS PRN
Qty: 15 TABLET | Refills: 0 | Status: SHIPPED | OUTPATIENT
Start: 2024-08-30 | End: 2024-09-04

## 2024-08-30 RX ORDER — CIPROFLOXACIN 500 MG/1
500 TABLET, FILM COATED ORAL 2 TIMES DAILY
Qty: 14 TABLET | Refills: 0 | Status: SHIPPED | OUTPATIENT
Start: 2024-08-30 | End: 2024-09-06

## 2024-08-30 NOTE — PATIENT INSTRUCTIONS
Percocet and cipro floxin prescriptions sent to your pharmacy  Crutches prescription given to patient   Return in 1 week  Betadine to wound then antibiotic ointment and dry dressing daily
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
## 2024-09-13 ENCOUNTER — OFFICE VISIT (OUTPATIENT)
Dept: INTERNAL MEDICINE CLINIC | Age: 64
End: 2024-09-13
Payer: COMMERCIAL

## 2024-09-13 DIAGNOSIS — Z79.4 TYPE 2 DIABETES MELLITUS WITHOUT COMPLICATION, WITH LONG-TERM CURRENT USE OF INSULIN (HCC): ICD-10-CM

## 2024-09-13 DIAGNOSIS — E11.9 TYPE 2 DIABETES MELLITUS WITHOUT COMPLICATION, WITH LONG-TERM CURRENT USE OF INSULIN (HCC): ICD-10-CM

## 2024-09-13 DIAGNOSIS — L97.411 SKIN ULCER OF RIGHT HEEL, LIMITED TO BREAKDOWN OF SKIN (HCC): Primary | ICD-10-CM

## 2024-09-13 DIAGNOSIS — G89.21: ICD-10-CM

## 2024-09-13 PROCEDURE — 99213 OFFICE O/P EST LOW 20 MIN: CPT

## 2024-09-13 PROCEDURE — 11042 DBRDMT SUBQ TIS 1ST 20SQCM/<: CPT

## 2025-05-13 ENCOUNTER — HOSPITAL ENCOUNTER (INPATIENT)
Age: 65
LOS: 8 days | Discharge: HOME | DRG: 140 | End: 2025-05-22
Attending: EMERGENCY MEDICINE | Admitting: INTERNAL MEDICINE
Payer: MEDICAID

## 2025-05-13 ENCOUNTER — APPOINTMENT (OUTPATIENT)
Dept: GENERAL RADIOLOGY | Age: 65
DRG: 140 | End: 2025-05-13
Payer: MEDICAID

## 2025-05-13 DIAGNOSIS — J96.21 ACUTE ON CHRONIC RESPIRATORY FAILURE WITH HYPOXIA AND HYPERCAPNIA (HCC): ICD-10-CM

## 2025-05-13 DIAGNOSIS — J96.22 ACUTE ON CHRONIC RESPIRATORY FAILURE WITH HYPOXIA AND HYPERCAPNIA (HCC): ICD-10-CM

## 2025-05-13 DIAGNOSIS — J44.1 COPD EXACERBATION (HCC): Primary | ICD-10-CM

## 2025-05-13 LAB
BASE EXCESS BLDV CALC-SCNC: 3.8 MMOL/L (ref -2–3)
BASOPHILS # BLD: 0 K/UL (ref 0–0.2)
BASOPHILS NFR BLD: 0.3 %
CO2 BLDV-SCNC: 36 MMOL/L
COHGB MFR BLDV: 4.2 % (ref 0–1.5)
DEPRECATED RDW RBC AUTO: 18.7 % (ref 12.4–15.4)
DO-HGB MFR BLDV: 11.2 %
EOSINOPHIL # BLD: 0 K/UL (ref 0–0.6)
EOSINOPHIL NFR BLD: 0 %
HCO3 BLDV-SCNC: 33.7 MMOL/L (ref 24–28)
HCT VFR BLD AUTO: 37.6 % (ref 36–48)
HGB BLD-MCNC: 12.1 G/DL (ref 12–16)
LYMPHOCYTES # BLD: 3.2 K/UL (ref 1–5.1)
LYMPHOCYTES NFR BLD: 34.3 %
MCH RBC QN AUTO: 23.5 PG (ref 26–34)
MCHC RBC AUTO-ENTMCNC: 32.2 G/DL (ref 31–36)
MCV RBC AUTO: 73 FL (ref 80–100)
METHGB MFR BLDV: 0.1 % (ref 0–1.5)
MONOCYTES # BLD: 0.8 K/UL (ref 0–1.3)
MONOCYTES NFR BLD: 8.8 %
NEUTROPHILS # BLD: 5.3 K/UL (ref 1.7–7.7)
NEUTROPHILS NFR BLD: 56.6 %
PCO2 BLDV: 80.3 MMHG (ref 41–51)
PH BLDV: 7.23 [PH] (ref 7.35–7.45)
PLATELET # BLD AUTO: 278 K/UL (ref 135–450)
PMV BLD AUTO: 7.6 FL (ref 5–10.5)
PO2 BLDV: 61.9 MMHG (ref 25–40)
RBC # BLD AUTO: 5.15 M/UL (ref 4–5.2)
SAO2 % BLDV: 88 %
WBC # BLD AUTO: 9.4 K/UL (ref 4–11)

## 2025-05-13 PROCEDURE — 93005 ELECTROCARDIOGRAM TRACING: CPT | Performed by: EMERGENCY MEDICINE

## 2025-05-13 PROCEDURE — 96365 THER/PROPH/DIAG IV INF INIT: CPT

## 2025-05-13 PROCEDURE — 80048 BASIC METABOLIC PNL TOTAL CA: CPT

## 2025-05-13 PROCEDURE — 94640 AIRWAY INHALATION TREATMENT: CPT

## 2025-05-13 PROCEDURE — 6360000002 HC RX W HCPCS: Performed by: EMERGENCY MEDICINE

## 2025-05-13 PROCEDURE — 96375 TX/PRO/DX INJ NEW DRUG ADDON: CPT

## 2025-05-13 PROCEDURE — 83605 ASSAY OF LACTIC ACID: CPT

## 2025-05-13 PROCEDURE — 85025 COMPLETE CBC W/AUTO DIFF WBC: CPT

## 2025-05-13 PROCEDURE — 99285 EMERGENCY DEPT VISIT HI MDM: CPT

## 2025-05-13 PROCEDURE — 6360000002 HC RX W HCPCS

## 2025-05-13 PROCEDURE — 82803 BLOOD GASES ANY COMBINATION: CPT

## 2025-05-13 PROCEDURE — 71045 X-RAY EXAM CHEST 1 VIEW: CPT

## 2025-05-13 PROCEDURE — 87636 SARSCOV2 & INF A&B AMP PRB: CPT

## 2025-05-13 PROCEDURE — 2700000000 HC OXYGEN THERAPY PER DAY

## 2025-05-13 PROCEDURE — 94761 N-INVAS EAR/PLS OXIMETRY MLT: CPT

## 2025-05-13 PROCEDURE — 2500000003 HC RX 250 WO HCPCS: Performed by: EMERGENCY MEDICINE

## 2025-05-13 PROCEDURE — 84484 ASSAY OF TROPONIN QUANT: CPT

## 2025-05-13 PROCEDURE — 83880 ASSAY OF NATRIURETIC PEPTIDE: CPT

## 2025-05-13 PROCEDURE — 84145 PROCALCITONIN (PCT): CPT

## 2025-05-13 RX ORDER — ALBUTEROL SULFATE 0.83 MG/ML
15 SOLUTION RESPIRATORY (INHALATION)
Status: COMPLETED | OUTPATIENT
Start: 2025-05-14 | End: 2025-05-13

## 2025-05-13 RX ORDER — MAGNESIUM SULFATE IN WATER 40 MG/ML
2000 INJECTION, SOLUTION INTRAVENOUS ONCE
Status: COMPLETED | OUTPATIENT
Start: 2025-05-13 | End: 2025-05-14

## 2025-05-13 RX ORDER — FENTANYL CITRATE 50 UG/ML
50 INJECTION, SOLUTION INTRAMUSCULAR; INTRAVENOUS ONCE
Status: COMPLETED | OUTPATIENT
Start: 2025-05-14 | End: 2025-05-13

## 2025-05-13 RX ORDER — ALBUTEROL SULFATE 0.83 MG/ML
SOLUTION RESPIRATORY (INHALATION)
Status: COMPLETED
Start: 2025-05-13 | End: 2025-05-13

## 2025-05-13 RX ADMIN — ALBUTEROL SULFATE 2.5 MG: 2.5 SOLUTION RESPIRATORY (INHALATION) at 23:35

## 2025-05-13 RX ADMIN — ALBUTEROL SULFATE 15 MG/HR: 2.5 SOLUTION RESPIRATORY (INHALATION) at 23:35

## 2025-05-13 RX ADMIN — WATER 125 MG: 1 INJECTION INTRAMUSCULAR; INTRAVENOUS; SUBCUTANEOUS at 23:52

## 2025-05-13 RX ADMIN — FENTANYL CITRATE 50 MCG: 50 INJECTION INTRAMUSCULAR; INTRAVENOUS at 23:59

## 2025-05-13 RX ADMIN — MAGNESIUM SULFATE HEPTAHYDRATE 2000 MG: 40 INJECTION, SOLUTION INTRAVENOUS at 23:56

## 2025-05-13 ASSESSMENT — PAIN - FUNCTIONAL ASSESSMENT: PAIN_FUNCTIONAL_ASSESSMENT: 0-10

## 2025-05-14 ENCOUNTER — APPOINTMENT (OUTPATIENT)
Dept: CT IMAGING | Age: 65
DRG: 140 | End: 2025-05-14
Payer: MEDICAID

## 2025-05-14 PROBLEM — J96.21 ACUTE ON CHRONIC RESPIRATORY FAILURE WITH HYPOXIA AND HYPERCAPNIA (HCC): Status: ACTIVE | Noted: 2025-05-14

## 2025-05-14 PROBLEM — J96.22 ACUTE ON CHRONIC RESPIRATORY FAILURE WITH HYPOXIA AND HYPERCAPNIA (HCC): Status: ACTIVE | Noted: 2025-05-14

## 2025-05-14 LAB
ANION GAP SERPL CALCULATED.3IONS-SCNC: 8 MMOL/L (ref 3–16)
ANION GAP SERPL CALCULATED.3IONS-SCNC: 9 MMOL/L (ref 3–16)
BASE EXCESS BLDA CALC-SCNC: 3.5 MMOL/L (ref -3–3)
BASE EXCESS BLDA CALC-SCNC: 5.7 MMOL/L (ref -3–3)
BASE EXCESS BLDV CALC-SCNC: 0.9 MMOL/L (ref -2–3)
BASE EXCESS BLDV CALC-SCNC: 1.9 MMOL/L (ref -2–3)
BUN SERPL-MCNC: 10 MG/DL (ref 7–20)
BUN SERPL-MCNC: 8 MG/DL (ref 7–20)
CALCIUM SERPL-MCNC: 9.2 MG/DL (ref 8.3–10.6)
CALCIUM SERPL-MCNC: 9.3 MG/DL (ref 8.3–10.6)
CHLORIDE SERPL-SCNC: 101 MMOL/L (ref 99–110)
CHLORIDE SERPL-SCNC: 99 MMOL/L (ref 99–110)
CO2 BLDA-SCNC: 37 MMOL/L
CO2 BLDA-SCNC: 37 MMOL/L
CO2 BLDV-SCNC: 35 MMOL/L
CO2 BLDV-SCNC: 37 MMOL/L
CO2 SERPL-SCNC: 29 MMOL/L (ref 21–32)
CO2 SERPL-SCNC: 29 MMOL/L (ref 21–32)
COHGB MFR BLDA: 2.1 % (ref 0–1.5)
COHGB MFR BLDA: 2.7 % (ref 0–1.5)
COHGB MFR BLDV: 3.7 % (ref 0–1.5)
COHGB MFR BLDV: 3.8 % (ref 0–1.5)
CREAT SERPL-MCNC: 0.6 MG/DL (ref 0.6–1.2)
CREAT SERPL-MCNC: 0.7 MG/DL (ref 0.6–1.2)
DO-HGB MFR BLDV: 19.2 %
DO-HGB MFR BLDV: 4.1 %
EKG ATRIAL RATE: 100 BPM
EKG DIAGNOSIS: NORMAL
EKG P AXIS: 91 DEGREES
EKG P-R INTERVAL: 140 MS
EKG Q-T INTERVAL: 324 MS
EKG QRS DURATION: 70 MS
EKG QTC CALCULATION (BAZETT): 417 MS
EKG R AXIS: 64 DEGREES
EKG T AXIS: 66 DEGREES
EKG VENTRICULAR RATE: 100 BPM
FLUAV RNA RESP QL NAA+PROBE: NOT DETECTED
FLUBV RNA RESP QL NAA+PROBE: NOT DETECTED
GFR SERPLBLD CREATININE-BSD FMLA CKD-EPI: >90 ML/MIN/{1.73_M2}
GFR SERPLBLD CREATININE-BSD FMLA CKD-EPI: >90 ML/MIN/{1.73_M2}
GLUCOSE BLD-MCNC: 165 MG/DL (ref 70–99)
GLUCOSE BLD-MCNC: 181 MG/DL (ref 70–99)
GLUCOSE BLD-MCNC: 211 MG/DL (ref 70–99)
GLUCOSE BLD-MCNC: 217 MG/DL (ref 70–99)
GLUCOSE BLD-MCNC: 220 MG/DL (ref 70–99)
GLUCOSE BLD-MCNC: 238 MG/DL (ref 70–99)
GLUCOSE BLD-MCNC: 250 MG/DL (ref 70–99)
GLUCOSE SERPL-MCNC: 165 MG/DL (ref 70–99)
GLUCOSE SERPL-MCNC: 205 MG/DL (ref 70–99)
HCO3 BLDA-SCNC: 34 MMOL/L (ref 21–29)
HCO3 BLDA-SCNC: 35 MMOL/L (ref 21–29)
HCO3 BLDV-SCNC: 32.3 MMOL/L (ref 24–28)
HCO3 BLDV-SCNC: 33.8 MMOL/L (ref 24–28)
HGB BLDA-MCNC: 12.4 G/DL
HGB BLDA-MCNC: 12.4 G/DL
LACTATE BLDV-SCNC: 0.7 MMOL/L (ref 0.4–2)
METHGB MFR BLDA: 0 % (ref 0–1.4)
METHGB MFR BLDA: 0 % (ref 0–1.4)
METHGB MFR BLDV: 0.1 % (ref 0–1.5)
METHGB MFR BLDV: 0.2 % (ref 0–1.5)
NT-PROBNP SERPL-MCNC: 118 PG/ML (ref 0–124)
PCO2 BLDA: 74.3 MMHG (ref 35–45)
PCO2 BLDA: 87.5 MMHG (ref 35–45)
PCO2 BLDV: 84.5 MMHG (ref 41–51)
PCO2 BLDV: >98 MMHG (ref 41–51)
PERFORMED ON: ABNORMAL
PH BLDA: 7.2 [PH] (ref 7.35–7.45)
PH BLDA: 7.28 [PH] (ref 7.35–7.45)
PH BLDV: 7.09 [PH] (ref 7.35–7.45)
PH BLDV: 7.19 [PH] (ref 7.35–7.45)
PO2 BLDA: 73.5 MMHG (ref 75–108)
PO2 BLDA: 80.7 MMHG (ref 75–108)
PO2 BLDV: 53.3 MMHG (ref 25–40)
PO2 BLDV: 98.9 MMHG (ref 25–40)
POTASSIUM SERPL-SCNC: 4.2 MMOL/L (ref 3.5–5.1)
POTASSIUM SERPL-SCNC: 4.9 MMOL/L (ref 3.5–5.1)
PROCALCITONIN SERPL IA-MCNC: 0.05 NG/ML (ref 0–0.15)
SAO2 % BLDA: 94 % (ref 93–100)
SAO2 % BLDA: 94 % (ref 93–100)
SAO2 % BLDV: 80 %
SAO2 % BLDV: 96 %
SARS-COV-2 RNA RESP QL NAA+PROBE: NOT DETECTED
SODIUM SERPL-SCNC: 136 MMOL/L (ref 136–145)
SODIUM SERPL-SCNC: 139 MMOL/L (ref 136–145)
TROPONIN, HIGH SENSITIVITY: 13 NG/L (ref 0–14)
TROPONIN, HIGH SENSITIVITY: 14 NG/L (ref 0–14)

## 2025-05-14 PROCEDURE — 80048 BASIC METABOLIC PNL TOTAL CA: CPT

## 2025-05-14 PROCEDURE — 6370000000 HC RX 637 (ALT 250 FOR IP): Performed by: INTERNAL MEDICINE

## 2025-05-14 PROCEDURE — 6360000002 HC RX W HCPCS: Performed by: INTERNAL MEDICINE

## 2025-05-14 PROCEDURE — 99222 1ST HOSP IP/OBS MODERATE 55: CPT | Performed by: NURSE PRACTITIONER

## 2025-05-14 PROCEDURE — 6360000004 HC RX CONTRAST MEDICATION: Performed by: INTERNAL MEDICINE

## 2025-05-14 PROCEDURE — 6360000002 HC RX W HCPCS

## 2025-05-14 PROCEDURE — 2060000000 HC ICU INTERMEDIATE R&B

## 2025-05-14 PROCEDURE — 2700000000 HC OXYGEN THERAPY PER DAY

## 2025-05-14 PROCEDURE — 82803 BLOOD GASES ANY COMBINATION: CPT

## 2025-05-14 PROCEDURE — 36600 WITHDRAWAL OF ARTERIAL BLOOD: CPT

## 2025-05-14 PROCEDURE — 2580000003 HC RX 258

## 2025-05-14 PROCEDURE — 71275 CT ANGIOGRAPHY CHEST: CPT

## 2025-05-14 PROCEDURE — 94640 AIRWAY INHALATION TREATMENT: CPT

## 2025-05-14 PROCEDURE — 6360000002 HC RX W HCPCS: Performed by: NURSE PRACTITIONER

## 2025-05-14 PROCEDURE — 99223 1ST HOSP IP/OBS HIGH 75: CPT | Performed by: INTERNAL MEDICINE

## 2025-05-14 PROCEDURE — 36415 COLL VENOUS BLD VENIPUNCTURE: CPT

## 2025-05-14 PROCEDURE — 94660 CPAP INITIATION&MGMT: CPT

## 2025-05-14 PROCEDURE — 84484 ASSAY OF TROPONIN QUANT: CPT

## 2025-05-14 PROCEDURE — 2500000003 HC RX 250 WO HCPCS: Performed by: INTERNAL MEDICINE

## 2025-05-14 PROCEDURE — 94761 N-INVAS EAR/PLS OXIMETRY MLT: CPT

## 2025-05-14 RX ORDER — ROSUVASTATIN CALCIUM 20 MG/1
20 TABLET, COATED ORAL DAILY
Status: DISCONTINUED | OUTPATIENT
Start: 2025-05-14 | End: 2025-05-22 | Stop reason: HOSPADM

## 2025-05-14 RX ORDER — SODIUM CHLORIDE 9 MG/ML
INJECTION, SOLUTION INTRAVENOUS PRN
Status: DISCONTINUED | OUTPATIENT
Start: 2025-05-14 | End: 2025-05-22 | Stop reason: HOSPADM

## 2025-05-14 RX ORDER — CEPHALEXIN 500 MG/1
500 CAPSULE ORAL 3 TIMES DAILY
Qty: 15 CAPSULE | Refills: 0 | Status: SHIPPED | OUTPATIENT
Start: 2025-05-14 | End: 2025-05-14

## 2025-05-14 RX ORDER — DEXTROSE MONOHYDRATE 100 MG/ML
INJECTION, SOLUTION INTRAVENOUS CONTINUOUS PRN
Status: DISCONTINUED | OUTPATIENT
Start: 2025-05-14 | End: 2025-05-19

## 2025-05-14 RX ORDER — TRAMADOL HYDROCHLORIDE 50 MG/1
50 TABLET ORAL EVERY 6 HOURS PRN
Status: DISCONTINUED | OUTPATIENT
Start: 2025-05-14 | End: 2025-05-16

## 2025-05-14 RX ORDER — HYDROXYZINE HYDROCHLORIDE 10 MG/1
10 TABLET, FILM COATED ORAL 3 TIMES DAILY PRN
Status: DISCONTINUED | OUTPATIENT
Start: 2025-05-14 | End: 2025-05-22 | Stop reason: HOSPADM

## 2025-05-14 RX ORDER — ENOXAPARIN SODIUM 100 MG/ML
40 INJECTION SUBCUTANEOUS DAILY
Status: DISCONTINUED | OUTPATIENT
Start: 2025-05-14 | End: 2025-05-22 | Stop reason: HOSPADM

## 2025-05-14 RX ORDER — ARFORMOTEROL TARTRATE 15 UG/2ML
15 SOLUTION RESPIRATORY (INHALATION)
Status: DISCONTINUED | OUTPATIENT
Start: 2025-05-14 | End: 2025-05-22 | Stop reason: HOSPADM

## 2025-05-14 RX ORDER — ONDANSETRON 2 MG/ML
4 INJECTION INTRAMUSCULAR; INTRAVENOUS EVERY 6 HOURS PRN
Status: DISCONTINUED | OUTPATIENT
Start: 2025-05-14 | End: 2025-05-22 | Stop reason: HOSPADM

## 2025-05-14 RX ORDER — INSULIN LISPRO 100 [IU]/ML
0-8 INJECTION, SOLUTION INTRAVENOUS; SUBCUTANEOUS
Status: DISCONTINUED | OUTPATIENT
Start: 2025-05-14 | End: 2025-05-19

## 2025-05-14 RX ORDER — POTASSIUM CHLORIDE 1500 MG/1
40 TABLET, EXTENDED RELEASE ORAL PRN
Status: DISCONTINUED | OUTPATIENT
Start: 2025-05-14 | End: 2025-05-15

## 2025-05-14 RX ORDER — POLYETHYLENE GLYCOL 3350 17 G/17G
17 POWDER, FOR SOLUTION ORAL DAILY PRN
Status: DISCONTINUED | OUTPATIENT
Start: 2025-05-14 | End: 2025-05-22 | Stop reason: SDUPTHER

## 2025-05-14 RX ORDER — GUAIFENESIN 600 MG/1
1200 TABLET, EXTENDED RELEASE ORAL 2 TIMES DAILY
Status: DISCONTINUED | OUTPATIENT
Start: 2025-05-14 | End: 2025-05-15

## 2025-05-14 RX ORDER — SODIUM CHLORIDE 0.9 % (FLUSH) 0.9 %
5-40 SYRINGE (ML) INJECTION PRN
Status: DISCONTINUED | OUTPATIENT
Start: 2025-05-14 | End: 2025-05-22 | Stop reason: HOSPADM

## 2025-05-14 RX ORDER — IPRATROPIUM BROMIDE AND ALBUTEROL SULFATE 2.5; .5 MG/3ML; MG/3ML
1 SOLUTION RESPIRATORY (INHALATION)
Status: DISCONTINUED | OUTPATIENT
Start: 2025-05-14 | End: 2025-05-14

## 2025-05-14 RX ORDER — LORAZEPAM 2 MG/ML
1 INJECTION INTRAMUSCULAR
Status: COMPLETED | OUTPATIENT
Start: 2025-05-14 | End: 2025-05-14

## 2025-05-14 RX ORDER — SODIUM CHLORIDE 0.9 % (FLUSH) 0.9 %
5-40 SYRINGE (ML) INJECTION EVERY 12 HOURS SCHEDULED
Status: DISCONTINUED | OUTPATIENT
Start: 2025-05-14 | End: 2025-05-22 | Stop reason: HOSPADM

## 2025-05-14 RX ORDER — GLUCAGON 1 MG/ML
1 KIT INJECTION PRN
Status: DISCONTINUED | OUTPATIENT
Start: 2025-05-14 | End: 2025-05-22 | Stop reason: HOSPADM

## 2025-05-14 RX ORDER — IPRATROPIUM BROMIDE AND ALBUTEROL SULFATE 2.5; .5 MG/3ML; MG/3ML
1 SOLUTION RESPIRATORY (INHALATION)
Status: DISCONTINUED | OUTPATIENT
Start: 2025-05-14 | End: 2025-05-19

## 2025-05-14 RX ORDER — HYDRALAZINE HYDROCHLORIDE 20 MG/ML
10 INJECTION INTRAMUSCULAR; INTRAVENOUS EVERY 8 HOURS PRN
Status: DISCONTINUED | OUTPATIENT
Start: 2025-05-14 | End: 2025-05-22 | Stop reason: HOSPADM

## 2025-05-14 RX ORDER — IOPAMIDOL 755 MG/ML
75 INJECTION, SOLUTION INTRAVASCULAR
Status: COMPLETED | OUTPATIENT
Start: 2025-05-14 | End: 2025-05-14

## 2025-05-14 RX ORDER — PANTOPRAZOLE SODIUM 40 MG/1
40 TABLET, DELAYED RELEASE ORAL
Status: DISCONTINUED | OUTPATIENT
Start: 2025-05-14 | End: 2025-05-15

## 2025-05-14 RX ORDER — ACETAMINOPHEN 325 MG/1
650 TABLET ORAL EVERY 6 HOURS PRN
Status: DISCONTINUED | OUTPATIENT
Start: 2025-05-14 | End: 2025-05-22 | Stop reason: HOSPADM

## 2025-05-14 RX ORDER — LIDOCAINE 4 G/G
1 PATCH TOPICAL DAILY
Status: DISCONTINUED | OUTPATIENT
Start: 2025-05-14 | End: 2025-05-22 | Stop reason: HOSPADM

## 2025-05-14 RX ORDER — POTASSIUM CHLORIDE 7.45 MG/ML
10 INJECTION INTRAVENOUS PRN
Status: DISCONTINUED | OUTPATIENT
Start: 2025-05-14 | End: 2025-05-14 | Stop reason: SDUPTHER

## 2025-05-14 RX ORDER — BUDESONIDE 0.5 MG/2ML
0.5 INHALANT ORAL
Status: DISCONTINUED | OUTPATIENT
Start: 2025-05-14 | End: 2025-05-22 | Stop reason: HOSPADM

## 2025-05-14 RX ORDER — ONDANSETRON 4 MG/1
4 TABLET, ORALLY DISINTEGRATING ORAL EVERY 8 HOURS PRN
Status: DISCONTINUED | OUTPATIENT
Start: 2025-05-14 | End: 2025-05-22 | Stop reason: HOSPADM

## 2025-05-14 RX ORDER — POTASSIUM CHLORIDE 7.45 MG/ML
10 INJECTION INTRAVENOUS PRN
Status: DISCONTINUED | OUTPATIENT
Start: 2025-05-14 | End: 2025-05-15

## 2025-05-14 RX ORDER — MAGNESIUM SULFATE IN WATER 40 MG/ML
2000 INJECTION, SOLUTION INTRAVENOUS PRN
Status: DISCONTINUED | OUTPATIENT
Start: 2025-05-14 | End: 2025-05-22 | Stop reason: HOSPADM

## 2025-05-14 RX ORDER — ACETAMINOPHEN 650 MG/1
650 SUPPOSITORY RECTAL EVERY 6 HOURS PRN
Status: DISCONTINUED | OUTPATIENT
Start: 2025-05-14 | End: 2025-05-22 | Stop reason: HOSPADM

## 2025-05-14 RX ORDER — LOSARTAN POTASSIUM 25 MG/1
25 TABLET ORAL DAILY
Status: DISCONTINUED | OUTPATIENT
Start: 2025-05-14 | End: 2025-05-19

## 2025-05-14 RX ADMIN — BUDESONIDE 500 MCG: 0.5 SUSPENSION RESPIRATORY (INHALATION) at 21:44

## 2025-05-14 RX ADMIN — LOSARTAN POTASSIUM 25 MG: 25 TABLET, FILM COATED ORAL at 09:08

## 2025-05-14 RX ADMIN — TRAMADOL HYDROCHLORIDE 50 MG: 50 TABLET, COATED ORAL at 15:55

## 2025-05-14 RX ADMIN — SODIUM CHLORIDE, PRESERVATIVE FREE 10 ML: 5 INJECTION INTRAVENOUS at 09:12

## 2025-05-14 RX ADMIN — ACETAMINOPHEN 650 MG: 325 TABLET ORAL at 14:02

## 2025-05-14 RX ADMIN — IPRATROPIUM BROMIDE AND ALBUTEROL SULFATE 1 DOSE: 2.5; .5 SOLUTION RESPIRATORY (INHALATION) at 11:05

## 2025-05-14 RX ADMIN — INSULIN LISPRO 2 UNITS: 100 INJECTION, SOLUTION INTRAVENOUS; SUBCUTANEOUS at 02:50

## 2025-05-14 RX ADMIN — INSULIN LISPRO 2 UNITS: 100 INJECTION, SOLUTION INTRAVENOUS; SUBCUTANEOUS at 09:09

## 2025-05-14 RX ADMIN — SODIUM CHLORIDE, PRESERVATIVE FREE 10 ML: 5 INJECTION INTRAVENOUS at 21:16

## 2025-05-14 RX ADMIN — LORAZEPAM 1 MG: 2 INJECTION INTRAMUSCULAR; INTRAVENOUS at 21:32

## 2025-05-14 RX ADMIN — ARFORMOTEROL TARTRATE 15 MCG: 15 SOLUTION RESPIRATORY (INHALATION) at 21:43

## 2025-05-14 RX ADMIN — INSULIN LISPRO 4 UNITS: 100 INJECTION, SOLUTION INTRAVENOUS; SUBCUTANEOUS at 12:55

## 2025-05-14 RX ADMIN — ENOXAPARIN SODIUM 40 MG: 100 INJECTION SUBCUTANEOUS at 09:11

## 2025-05-14 RX ADMIN — HYDROXYZINE HYDROCHLORIDE 10 MG: 10 TABLET ORAL at 14:02

## 2025-05-14 RX ADMIN — INSULIN LISPRO 2 UNITS: 100 INJECTION, SOLUTION INTRAVENOUS; SUBCUTANEOUS at 21:15

## 2025-05-14 RX ADMIN — IPRATROPIUM BROMIDE AND ALBUTEROL SULFATE 1 DOSE: 2.5; .5 SOLUTION RESPIRATORY (INHALATION) at 21:44

## 2025-05-14 RX ADMIN — ARFORMOTEROL TARTRATE 15 MCG: 15 SOLUTION RESPIRATORY (INHALATION) at 09:47

## 2025-05-14 RX ADMIN — WATER 40 MG: 1 INJECTION INTRAMUSCULAR; INTRAVENOUS; SUBCUTANEOUS at 12:55

## 2025-05-14 RX ADMIN — GUAIFENESIN 1200 MG: 600 TABLET, EXTENDED RELEASE ORAL at 09:08

## 2025-05-14 RX ADMIN — ROSUVASTATIN CALCIUM 20 MG: 20 TABLET, FILM COATED ORAL at 09:08

## 2025-05-14 RX ADMIN — IPRATROPIUM BROMIDE AND ALBUTEROL SULFATE 1 DOSE: 2.5; .5 SOLUTION RESPIRATORY (INHALATION) at 16:35

## 2025-05-14 RX ADMIN — AZITHROMYCIN DIHYDRATE 500 MG: 500 INJECTION, POWDER, LYOPHILIZED, FOR SOLUTION INTRAVENOUS at 12:58

## 2025-05-14 RX ADMIN — BUDESONIDE 500 MCG: 0.5 SUSPENSION RESPIRATORY (INHALATION) at 07:54

## 2025-05-14 RX ADMIN — IPRATROPIUM BROMIDE AND ALBUTEROL SULFATE 1 DOSE: 2.5; .5 SOLUTION RESPIRATORY (INHALATION) at 07:54

## 2025-05-14 RX ADMIN — IOPAMIDOL 75 ML: 755 INJECTION, SOLUTION INTRAVENOUS at 03:24

## 2025-05-14 ASSESSMENT — PAIN DESCRIPTION - LOCATION: LOCATION: ARM;BACK;SHOULDER;HEAD

## 2025-05-14 ASSESSMENT — PAIN DESCRIPTION - ONSET: ONSET: ON-GOING

## 2025-05-14 ASSESSMENT — PAIN DESCRIPTION - DESCRIPTORS: DESCRIPTORS: ACHING

## 2025-05-14 ASSESSMENT — PAIN - FUNCTIONAL ASSESSMENT: PAIN_FUNCTIONAL_ASSESSMENT: ACTIVITIES ARE NOT PREVENTED

## 2025-05-14 ASSESSMENT — PAIN DESCRIPTION - ORIENTATION: ORIENTATION: RIGHT;LEFT

## 2025-05-14 ASSESSMENT — PAIN SCALES - GENERAL: PAINLEVEL_OUTOF10: 8

## 2025-05-14 ASSESSMENT — PAIN DESCRIPTION - FREQUENCY: FREQUENCY: CONTINUOUS

## 2025-05-14 ASSESSMENT — PAIN DESCRIPTION - PAIN TYPE: TYPE: CHRONIC PAIN

## 2025-05-14 NOTE — PROGRESS NOTES
Notified by lab of critical pCO2 87.5, MD and RT notified    Electronically signed by Anastasia Meeks RN on 5/14/2025 at 8:14 AM

## 2025-05-14 NOTE — ED NOTES
Patient reassessed, does not respond when asked but able to open her eyes. Informed MD, new orders carried out. BiPAP applied by Benjamin Zaragoza RN  05/14/25 0034

## 2025-05-14 NOTE — PROGRESS NOTES
Pt agreeable to put bipap back on for 2 hours and then doing a repeat ABG before potentially taking a break.      Electronically signed by Anastasia Meeks RN on 5/14/2025 at 9:45 AM

## 2025-05-14 NOTE — ED TRIAGE NOTES
Pt been having difficulty breathing since this morning, on 3 lpm oxygen at baseline but spo2 is just 75%

## 2025-05-14 NOTE — PROGRESS NOTES
4 Eyes Skin Assessment     NAME:  Susanne Jason  YOB: 1960  MEDICAL RECORD NUMBER:  [id number]    The patient is being assessed for  Admission    I agree that at least one RN has performed a thorough Head to Toe Skin Assessment on the patient. ALL assessment sites listed below have been assessed.      Areas assessed by both nurses:    Head, Face, Ears, Shoulders, Back, Chest, Arms, Elbows, Hands, Sacrum. Buttock, Coccyx, Ischium, Legs. Feet and Heels, and Under Medical Devices         Does the Patient have a Wound? No noted wound(s)       Severino Prevention initiated by RN: Yes  Wound Care Orders initiated by RN: No    Pressure Injury (Stage 3,4, Unstageable, DTI, NWPT, and Complex wounds) if present, place Wound referral order by RN under : No    New Ostomies, if present place, Ostomy referral order under : No     Nurse 1 eSignature: Electronically signed by Deb Marks RN on 5/14/25 at 3:52 AM EDT    **SHARE this note so that the co-signing nurse can place an eSignature**    Nurse 2 eSignature: Electronically signed by Lulu Rouse RN on 5/14/25 at 3:58 AM EDT

## 2025-05-14 NOTE — H&P
V2.0  History and Physical      Name:  Susanne Jason /Age/Sex: 1960  (65 y.o. female)   MRN & CSN:  [id number] & [id number] Encounter Date/Time: 2025 2:33 AM EDT   Location:  2[facility] PCP: No primary care provider on file.       Hospital Day: 2    Assessment and Plan:   Susanne Jason is a 65 y.o. female with past medical history of COPD on baseline 3 L oxygen at home, asthma, diabetes, hypertension, hyperlipidemia who is presenting with acute on chronic hypercarbic respiratory failure and acute exacerbation of COPD with respiratory acidosis requiring BiPAP    #1 Acute on chronic hypoxic and hypercarbic respiratory failure requiring BiPAP-ABG shows pH of 7.1 and a PCO2 of 84  #2.Acute exacerbation of COPD    Admit to PCU  BiPAP  DuoNebs every 4  Pulmicort twice daily  Mucinex 1200 twice daily  Solu-Medrol 40 IV twice daily  Patient is very poorly managed with her COPD at home and her tobacco abuse is not helping  CT of the chest to rule out PE    #3 chronic medical conditions include    Tobacco dependence  Type 2 diabetes  Essential hypertension  Hyperlipidemia  Surgery for perforated gastric ulcer    Hospital Problems           Last Modified POA    * (Principal) COPD (chronic obstructive pulmonary disease) ([facility]) 2025 Yes       Disposition:   Current Living situation: home  Expected Disposition: home  Estimated D/C:TBD    Diet ADULT DIET; Regular; 4 carb choices (60 gm/meal)   DVT Prophylaxis [x] Lovenox, []  Heparin, [] SCDs, [] Ambulation,  [] Eliquis, [] Xarelto, [] Coumadin   Code Status Full Code   Surrogate Decision Maker/ POA      Personally reviewed Lab Studies and Imaging     History from:     patient    History of Present Illness:     Chief Complaint: SOB    Susanne Jason is a 65 y.o. female with past medical history of chronic hypoxic respiratory failure on 3 L nasal cannula at baseline, COPD, asthma, tobacco dependence, diabetes, hypertension, hyperlipidemia, surgery  past surgical history that includes Colonoscopy; laparoscopy; [place] tooth extraction; other surgical history (1/9/2015); other surgical history; and Plantar fascia surgery (Left, 3/30/16).  Allergies:   Allergies   Allergen Reactions    Latex Itching and Rash     Fam HX:  family history is not on file.  Soc HX:   Social History     Socioeconomic History    Marital status:      Spouse name: None    Number of children: 3    Years of education: None    Highest education level: High school graduate   Tobacco Use    Smoking status: Heavy Smoker     Current packs/day: 0.50     Average packs/day: 0.5 packs/day for 30.0 years (15.0 ttl pk-yrs)     Types: Cigarettes   Substance and Sexual Activity    Alcohol use: No    Drug use: No     Social Drivers of Health     Financial Resource Strain: Medium Risk (11/13/2023)    Overall Financial Resource Strain (CARDIA)     Difficulty of Paying Living Expenses: Somewhat hard   Food Insecurity: No Food Insecurity (6/22/2024)    Hunger Vital Sign     Worried About Running Out of Food in the Last Year: Never true     Ran Out of Food in the Last Year: Never true   Transportation Needs: No Transportation Needs (6/22/2024)    PRAPARE - Transportation     Lack of Transportation (Medical): No     Lack of Transportation (Non-Medical): No   Physical Activity: Patient Declined (11/13/2023)    Exercise Vital Sign     Days of Exercise per Week: Patient declined     Minutes of Exercise per Session: Patient declined   Stress: Stress Concern Present (11/13/2023)    [employer] [place] [employer] - Occupational Stress Questionnaire     Feeling of Stress : To some extent   Social Connections: Unknown (11/13/2023)    Social Connection and Isolation Panel [NHANES]     Frequency of Communication with Friends and Family: More than three times a week     Frequency of Social Gatherings with Friends and Family: More than three times a week     Attends [employer]: Patient declined

## 2025-05-14 NOTE — CARE COORDINATION
Case Management Assessment  Initial Evaluation    Date/Time of Evaluation: 5/14/2025 12:04 PM  Assessment Completed by: JADEN SIMMONS    If patient is discharged prior to next notation, then this note serves as note for discharge by case management.    Patient Name: Susanne Jason                   YOB: 1960  Diagnosis: COPD (chronic obstructive pulmonary disease) (HCC) [J44.9]  COPD exacerbation (HCC) [J44.1]  Acute on chronic respiratory failure with hypoxia and hypercapnia (HCC) [J96.21, J96.22]                   Date / Time: 5/13/2025 11:24 PM    Patient Admission Status: Inpatient   Readmission Risk (Low < 19, Mod (19-27), High > 27): Readmission Risk Score: 12.5    Current PCP: No primary care provider on file.  PCP verified by CM? Yes    Chart Reviewed: Yes      History Provided by: Child/Family (due to patient having SOB)  Patient Orientation: Alert and Oriented    Patient Cognition: Alert    Hospitalization in the last 30 days (Readmission):  No    If yes, Readmission Assessment in CM Navigator will be completed.    Advance Directives:      Code Status: Full Code   Patient's Primary Decision Maker is: Legal Next of Kin      Discharge Planning:    Patient lives with: Children Type of Home: House (2 family home)  Primary Care Giver: Self  Patient Support Systems include: Children   Current Financial resources: Medicaid  Current community resources: None  Current services prior to admission: Oxygen Therapy            Current DME:              Type of Home Care services:  None    ADLS  Prior functional level: Assistance with the following:, Housework, Shopping  Current functional level: Assistance with the following:, Other (see comment) (tbd)    PT AM-PAC:   /24  OT AM-PAC:   /24    Family can provide assistance at DC: Other (comment) (tbd)  Would you like Case Management to discuss the discharge plan with any other family members/significant others, and if so, who? Yes (if needed)  Plans

## 2025-05-14 NOTE — PROGRESS NOTES
Pt on side of bed, removed HFNC, iv and purewick.  Audibly wheezing unable to form complete sentences without taking additional breaths.  Yelling out that she can't breathe and has to go to the bathroom.  Assisted pt to get back to bed and replaced oxygen.  Pt satting 72% on 15L hfnc at that time.  Encouraged pt to take deep breaths and encouraged going back on the bipap.  Pt continuing to refuse and yelling at staff.  Educated pt that the next step after bipap would be intubation which agitated pt further and she continued getting verbally aggressive with staff.  Placed pt on 15L NRB as well as the 15L HFNC to get her to recover and is now at 100%.  RT at bedside notified as well as MD.    Electronically signed by Anastasia Meeks RN on 5/14/2025 at 6:59 PM

## 2025-05-14 NOTE — PROGRESS NOTES
PH shows increased hypercapnia. I have explained to the pt that she needs to go on bipap and that is imperative for her situation, pt refused.  notified.

## 2025-05-14 NOTE — PLAN OF CARE
Problem: Chronic Conditions and Co-morbidities  Goal: Patient's chronic conditions and co-morbidity symptoms are monitored and maintained or improved  Outcome: Progressing  Flowsheets (Taken 5/14/2025 0653)  Care Plan - Patient's Chronic Conditions and Co-Morbidity Symptoms are Monitored and Maintained or Improved:   Monitor and assess patient's chronic conditions and comorbid symptoms for stability, deterioration, or improvement   Collaborate with multidisciplinary team to address chronic and comorbid conditions and prevent exacerbation or deterioration   Update acute care plan with appropriate goals if chronic or comorbid symptoms are exacerbated and prevent overall improvement and discharge     Problem: Discharge Planning  Goal: Discharge to home or other facility with appropriate resources  Outcome: Progressing  Flowsheets (Taken 5/14/2025 0653)  Discharge to home or other facility with appropriate resources:   Identify barriers to discharge with patient and caregiver   Arrange for needed discharge resources and transportation as appropriate   Identify discharge learning needs (meds, wound care, etc)   Refer to discharge planning if patient needs post-hospital services based on physician order or complex needs related to functional status, cognitive ability or social support system     Problem: Safety - Adult  Goal: Free from fall injury  Outcome: Progressing  Flowsheets (Taken 5/14/2025 0653)  Free From Fall Injury: Instruct family/caregiver on patient safety

## 2025-05-14 NOTE — PROGRESS NOTES
V2.0    [facility] Progress Note      Name:  Susanne Jason /Age/Sex: 1960  (65 y.o. female)   MRN & CSN:  [id number] & [id number] Encounter Date/Time: 2025 8:17 AM EDT   Location:  [facility]/4319-01 PCP: No primary care provider on file.     Attending:Chely Velazquez MD       Hospital Day: 2    HPI:    Assessment and Recommendations     Hospital course:    Susanne Jason is a 65 y.o. female with pmh of COPD, chronic respiratory failure, hyperlipidemia, hypertension, diabetes who presents with COPD (chronic obstructive pulmonary disease) (HCC) exacerbation with acute on chronic hypoxic respiratory failure with acute on chronic hypercarbic respiratory failure      Plan: Patient admitted overnight.      Acute on chronic hypoxic respiratory failure with acute hypercarbic respiratory failure-still hypercarbic.  After a lot of coaxing patient agreeable to wear BiPAP.  Repeat ABG in a couple of hours  -Placed on BiPAP on admission.  At present on high flow nasal cannula  - Patient hypoxic in the ED needing 15 L of oxygen to maintain sats greater than 97%.  VBG showed a pH of 7.1 with a CO2 of 84%  -on 3 L   - Discussed with pulmonology and respiratory therapy    COPD exacerbation  - Started on DuoNebs and steroids  - Continue inhaled steroids    Hypertension  - Uncontrolled on admission resume home BP medication  - As needed hydralazine ordered for systolic blood pressure greater than 180    Diabetes type 2  - Anticipate hyperglycemia due to steroids  - Started on sliding scale insulin  - As patient is on insulin monitor blood sugars for hypoglycemia  - Last A1c in  6 .7,repeat A1c pending      Chronic pain  -Continue tramadol    Hyperlipidemia  - Continue statin      Tobacco abuse  -Counseled about cessation    Due to the immediate potential for life-threatening deterioration due to acute hypercarbic respiratory failure secondary to COPD exacerbation, I spent >35  minutes providing critical care.   Value Date/Time    [facility] Bacteroides ovatus/xylanisolvens 08/24/2024 06:15 PM    [facility] Enterobacter cloacae complex 08/24/2024 06:15 PM    [facility] Streptococcus anginosus 08/24/2024 06:15 PM    ORG Pseudomonas aeruginosa 08/24/2024 06:15 PM         Electronically signed by Chely Velazquez MD on 5/14/2025 at 8:17 AM  Comment: Please note this report has been produced using speech recognition software and may contain errors related to that system including errors in grammar, punctuation, and spelling, as well as words and phrases that may be inappropriate. If there are any questions or concerns, please feel free to contact the dictating provider for clarification.

## 2025-05-14 NOTE — PLAN OF CARE
[facility]  Consult received.  Case reviewed with ER physician- [facility]    Full note to follow.    No primary care provider on file.    Thanks  KANNAN ORTEGA MD

## 2025-05-14 NOTE — PLAN OF CARE
Problem: Discharge Planning  Goal: Discharge to home or other facility with appropriate resources  5/14/2025 0959 by Anastasia Meeks, RN  Outcome: Progressing  Flowsheets (Taken 5/14/2025 0959)  Discharge to home or other facility with appropriate resources:   Identify barriers to discharge with patient and caregiver   Identify discharge learning needs (meds, wound care, etc)  Note: Pt planning to dc back home     Problem: Safety - Adult  Goal: Free from fall injury  5/14/2025 0959 by Anastasia Meeks, RN  Outcome: Progressing  Flowsheets (Taken 5/14/2025 0959)  Free From Fall Injury: Instruct family/caregiver on patient safety  Note: All fall precautions in place, call light and bedside table within reach      Problem: Pain  Goal: Verbalizes/displays adequate comfort level or baseline comfort level  Outcome: Progressing  Flowsheets (Taken 5/14/2025 0959)  Verbalizes/displays adequate comfort level or baseline comfort level:   Encourage patient to monitor pain and request assistance   Administer analgesics based on type and severity of pain and evaluate response  Note: Encouraged pt to notify RN of any pain

## 2025-05-14 NOTE — CONSULTS
Initial Pulmonary & Critical Care Consult Note    Patient Name: Susanne Jason   Patient : 1960   Date: 2025   Admit Date: 2025   CC:    Chief Complaint   Patient presents with    Respiratory Distress     Pt been having difficulty breathing since this morning, on 3 lpm oxygen at baseline but spo2 is just 75%         Reason for Consult:  acute on chronic resp failure on bipap   Requesting Physician: Dr Marcus Mo      Subjective   HPI: This is a 65-year-old female with past medical history significant for chronic hypoxic respiratory failure on 3 L oxygen at baseline, asthma, COPD, tobacco dependence, diabetes, hypertension who presented to the ER for shortness of breath.    As per the patient his shortness of breath had been going on for 2 to 3 days and had progressively gotten worse and was accompanied with cough.  She reports her cough was worse than her baseline and was accompanied with yellowish phlegm which is not normal for her as it is usually clear.  She also reported having a fever of 101 however she said that it resolved on its own.  She said that she was not feeling well for at least a week.     On arrival patient was hypoxic to mid 70s while on her 3 L of home oxygen she also had diffuse wheezing present bilaterally.  Her blood pressure was elevated 246/105 she was started on nebulizers with albuterol and was on steroids and magnesium.  Blood gas was significant for pH of 7.2, CO2 of 80, after breathing treatments patient became more somnolent repeat blood gas showed pH of 7.09 down from 7.2 and PCO2 greater than 98 for which she was started on BiPAP that was followed by improvement in her mental status and a blood gas 1 hour later showed a pH of 7.19, CO2 of 84.    Interval history: Patient seen at bedside this a.m. she continues to be in respiratory distress and she was on nasal cannula as patient was refusing BiPAP due to ongoing chest pain.  She states that her chest pain  Ejection fraction is visually estimated to be 60-65%. No regional wall   motion abnormalities are noted. Normal diastolic function.   There is mild tricuspid valve regurgitation, no stenosis.   The right ventricle is normal in size and function.   Trivial anterior pericardial effusion noted.    5/13/25:    Latest Reference Range & Units 05/13/25 23:35   pH, Alexei 7.350 - 7.450  7.232 (L)   pCO2, Alexei 41.0 - 51.0 mmHg 80.3 (H)   pO2, Alexei 25.0 - 40.0 mmHg 61.9 (H)   Bicarbonate, Venous 24.0 - 28.0 mmol/L 33.7 (H)   TC02 (Calc), Alexei mmol/L 36   Base Excess, Alexei -2.0 - 3.0 mmol/L 3.8 (H)   MetHgb, Alexei 0.0 - 1.5 % 0.1   O2 Saturation Venous Not established % 88   (L): Data is abnormally low  (H): Data is abnormally high     Latest Reference Range & Units 05/14/25 00:26   pH, Alexei 7.350 - 7.450  7.093 (LL)   pCO2, Alexei 41.0 - 51.0 mmHg >98.0 (H)   pO2, Alexei 25.0 - 40.0 mmHg 98.9 (H)   Bicarbonate, Venous 24.0 - 28.0 mmol/L 33.8 (H)   TC02 (Calc), Alexei mmol/L 37   Base Excess, Alexei -2.0 - 3.0 mmol/L 0.9   MetHgb, Alexei 0.0 - 1.5 % 0.1   O2 Saturation Venous Not established % 96        Latest Reference Range & Units 05/14/25 01:47   pH, Alexei 7.350 - 7.450  7.191 (LL)   pCO2, Alexei 41.0 - 51.0 mmHg 84.5 (H)   pO2, Alexei 25.0 - 40.0 mmHg 53.3 (H)   Bicarbonate, Venous 24.0 - 28.0 mmol/L 32.3 (H)   TC02 (Calc), Alexei mmol/L 35   Base Excess, Alexei -2.0 - 3.0 mmol/L 1.9   MetHgb, Alexei 0.0 - 1.5 % 0.2   O2 Saturation Venous Not established % 80     Blood gas 5/14/25:    Latest Reference Range & Units 05/14/25 08:08   pH, Arterial 7.350 - 7.450  7.200 (L)   pCO2, Arterial 35.0 - 45.0 mmHg 87.5 (HH)   pO2, Arterial 75.0 - 108.0 mmHg 80.7   HCO3, Arterial 21 - 29 mmol/L 34 (H)   TCO2 (calc), Art mmol/L 37   Base Excess, Arterial -3.0 - 3.0 mmol/L 3.5 (H)   O2 Sat, Arterial 93 - 100 % 94   Methemoglobin, Arterial 0.0 - 1.4 % 0.0   Carboxyhgb, Arterial 0.0 - 1.5 % 2.7 (H)       PFT ( 8/28/23)  fvc 1.53l/64%  fev1 0.70/37%  Fev1/fvc 0.45  tlc 147%  rv

## 2025-05-14 NOTE — PROGRESS NOTES
Physical Therapy and Occupational Therapy  No Treatment    Orders received and chart reviewed.  Pt not appropriate for PT/OT at this time due to respiratory status.  Will hold therapy evaluations at this time.  Discussed with RN and RN in agreement.  Will follow up later today vs 5/15 as time/schedule allow.    Chantel Diaz, PT #[id number]   Sarah Ying, OTR/L [id number]

## 2025-05-14 NOTE — CONSULTS
[facility]  Palliative Medicine Consultation Note      Date Of Admission:5/13/2025  Date of consult: 05/14/25  Seen by PC in the past:  No    Recommendations:        Saw pt at the bedside. She was sleeping on bipap, respirations even and unlabored. I called her name but she did not wake up. Did not disturb her further as she was upset and refusing bipap previously.     Called pt's daughter Lisbeth, introduced palliative care. We discussed her understanding of pt's condition. Lisbeth reported pt is fearful of going back on a ventilator but had not made a decision regarding her code status yet. Pt does not have a HCPOA to her knowledge. Pt is not legally , has a long term partner Abebe who is Lisbeth's father. Pt has two daughters, Lisbeth and another daughter whom Lisbeth says is less involved. Lisbeth would like to come in today so we can try to talk with pt about code status together. I asked pt's nurse to call me when Lisbeth arrives to bedside.     1451: pt's daughter Lisbeth has not yet arrived to bedside. Called her, no answer, left VM with callback number.     Pt remains severely hypercarbic and is now refusing to wear bipap.     Saw pt at the bedside, she is alert and oriented x4 and somewhat agitated. She reported that she is willing to put the bipap on later, she is just taking a break at this time. She does understand that she needs to wear the bipap if she wants to avoid intubation. She wants to avoid intubation, but agrees to intubation if absolutely necessary stating \"I don't want to die\".     We completed a HCPOA today. She named her long term partner Abebe Hatch as the primary agent, daughter Lisbeth as the first alternate agent and daughter Dora as the second alternate agent. Copies made and placed on chart.     We did not get to discuss her wishes regarding long term intubation/tracheostomy as she was upset about her chipped tooth and missing her dentist appointment tomorrow and also on  breakfast)  omeprazole (PRILOSEC) 40 MG delayed release capsule, Take 1 capsule by mouth daily  albuterol sulfate HFA (PROVENTIL;VENTOLIN;PROAIR) 108 (90 Base) MCG/ACT inhaler, Inhale 2 puffs into the lungs every 6 hours as needed for Wheezing    Allergies:  Latex    Social History:    TOBACCO: reports that she has been smoking. She has a 15 pack-year smoking history. She does not have any smokeless tobacco history on file.  ETOH:   reports no history of alcohol use.  Patient currently lives with family long term partner    Review of Systems -   Review of Systems: A 10 point review of systems was conducted, significant findings as notedin HPI.    Objective:          Physical Exam  Constitutional:       Appearance: She is ill-appearing.   Cardiovascular:      Rate and Rhythm: Normal rate and regular rhythm.      Heart sounds: Normal heart sounds.   Pulmonary:      Effort: Respiratory distress present.   Abdominal:      General: Bowel sounds are normal.      Palpations: Abdomen is soft.   Musculoskeletal:      Right lower leg: No edema.      Left lower leg: No edema.   Skin:     General: Skin is warm and dry.   Neurological:      Mental Status: She is oriented to person, place, and time.          Palliative Performance Scale:  [x] 60% Ambulation reduced; Significant disease; Can't do hobbies/housework; intake normal or reduced; occasional assist; LOC full/confusion  [] 50% Mainly sit/lie; Extensive disease; Can't do any work; Considerable assist; intake normal  Or reduced; LOC full/confusion  [] 40% Mainly in bed; Extensive disease; Mainly assist; intake normal or reduced; occasional assist; LOC full/confusion  [] 30% Bed Bound; Extensive disease; Total care; intake reduced; LOC full/confusion  [] 20% Bed Bound; Extensive disease; Total care; intake minimal; Drowsy/coma  [] 10% Bed Bound; Extensive disease; Total care; Mouth care only; Drowsy/coma  [] 0% Death    PPS: 60    Vitals:    /76   Pulse 95   Temp

## 2025-05-14 NOTE — ED PROVIDER NOTES
[facility]  EMERGENCY DEPARTMENT ENCOUNTER          ATTENDING PHYSICIAN NOTE       Date of evaluation: 5/13/2025    Chief Complaint     Respiratory Distress (Pt been having difficulty breathing since this morning, on 3 lpm oxygen at baseline but spo2 is just 75% )      History of Present Illness     Susanne Jason is a 65 y.o. female who presents to the emergency department complaining of shortness of breath.  Patient is limited in history due to her level of dyspnea.  She states she has had worsening shortness of breath throughout the day today.  Per history she had does have a history of asthma and COPD and is on 3 L nasal cannula at baseline and has had intubations in the past due to her respiratory issues.  She does note a cough.  She denies any chest pain or pressure.  She denies any swelling in her extremities.  On ED arrival she was noted to be hypoxic at 75% on her 3 L nasal cannula.    ASSESSMENT / PLAN  (MEDICAL DECISION MAKING)     INITIAL VITALS: BP: (!) 226/105, Temp: 97.2 °F (36.2 °C), Pulse: 99, Respirations: 24, SpO2: 97 %      Susanne Jason is a 65 y.o. female with history of asthma and COPD who is on 3 L nasal cannula at baseline presents for shortness of breath.  Patient noted increasing shortness of breath throughout the day today.  On arrival the emerged primary, patient was hypoxic with oxygen saturations in the mid 70s on her 3 L.  She had diffuse wheezing present bilaterally.  Patient was started on continuous nebulizer treatment with albuterol and was given steroids and magnesium.  Laboratory studies show respiratory acidosis with pH of 7.2 with pCO2 of 80.  CBC and renal panel unremarkable.  Lactate is within normal limits.  Procalcitonin is unremarkable.  Chest x-ray shows no evidence of pneumonia.  Troponin and BNP are unremarkable.  After the patient had completed her continuous nebulizer treatment, she was reassessed and was noted to be more somnolent.  Repeat gas  PORTABLE   Final Result   1.  No acute cardiopulmonary findings.      Electronically signed by Waldemar Gary          LABS:   Results for orders placed or performed during the hospital encounter of 05/13/25   COVID-19 & Influenza Combo    Specimen: Nasopharyngeal Swab   Result Value Ref Range    SARS-CoV-2 RNA, RT PCR NOT DETECTED NOT DETECTED    Influenza A NOT DETECTED NOT DETECTED    Influenza B NOT DETECTED NOT DETECTED   CBC with Auto Differential   Result Value Ref Range    WBC 9.4 4.0 - 11.0 K/uL    RBC 5.15 4.00 - 5.20 M/uL    Hemoglobin 12.1 12.0 - 16.0 g/dL    Hematocrit 37.6 36.0 - 48.0 %    MCV 73.0 (L) 80.0 - 100.0 fL    MCH 23.5 (L) 26.0 - 34.0 pg    MCHC 32.2 31.0 - 36.0 g/dL    RDW 18.7 (H) 12.4 - 15.4 %    Platelets 278 135 - 450 K/uL    MPV 7.6 5.0 - 10.5 fL    Neutrophils % 56.6 %    Lymphocytes % 34.3 %    Monocytes % 8.8 %    Eosinophils % 0.0 %    Basophils % 0.3 %    Neutrophils Absolute 5.3 1.7 - 7.7 K/uL    Lymphocytes Absolute 3.2 1.0 - 5.1 K/uL    Monocytes Absolute 0.8 0.0 - 1.3 K/uL    Eosinophils Absolute 0.0 0.0 - 0.6 K/uL    Basophils Absolute 0.0 0.0 - 0.2 K/uL   Basic Metabolic Panel   Result Value Ref Range    Sodium 139 136 - 145 mmol/L    Potassium 4.2 3.5 - 5.1 mmol/L    Chloride 101 99 - 110 mmol/L    CO2 29 21 - 32 mmol/L    Anion Gap 9 3 - 16    Glucose 165 (H) 70 - 99 mg/dL    BUN 8 7 - 20 mg/dL    Creatinine 0.7 0.6 - 1.2 mg/dL    Est, Glom Filt Rate >90 >60    Calcium 9.3 8.3 - 10.6 mg/dL   Blood Gas, Venous   Result Value Ref Range    pH, Alexei 7.232 (L) 7.350 - 7.450    pCO2, Alexei 80.3 (H) 41.0 - 51.0 mmHg    PO2, Alexei 61.9 (H) 25.0 - 40.0 mmHg    HCO3, Venous 33.7 (H) 24.0 - 28.0 mmol/L    Base Excess, Alexei 3.8 (H) -2.0 - 3.0 mmol/L    O2 Sat, Alexei 88 Not established %    Carboxyhemoglobin 4.2 (H) 0.0 - 1.5 %    MetHgb, Alexei 0.1 0.0 - 1.5 %    TC02 (Calc), Alexei 36 mmol/L    Hemoglobin, Alexei, Reduced 11.20 %   Troponin   Result Value Ref Range    Troponin, High Sensitivity 14 0 -

## 2025-05-14 NOTE — ED NOTES
Patient Name: Susanne Jason  : 1960 65 y.o.  MRN: [id number]  ED Room #: 2TR/2[facility]     Chief complaint:   Chief Complaint   Patient presents with    Respiratory Distress     Pt been having difficulty breathing since this morning, on 3 lpm oxygen at baseline but spo2 is just 75%      Hospital Problem/Diagnosis:   Hospital Problems           Last Modified POA    * (Principal) COPD (chronic obstructive pulmonary disease) ([facility]) 2025 Yes         O2 Flow Rate:O2 Device: PAP (positive airway pressure) O2 Flow Rate (L/min): 15 L/min (if applicable)  Cardiac Rhythm:   (if applicable)  Active LDA's:   Peripheral IV 25 Right Antecubital (Active)            How does patient ambulate? Unknown, did not assess in the Emergency Department    2. How does patient take pills? Unknown, no oral medications were given in the Emergency Department    3. Is patient alert? Alert    4. Is patient oriented? To Person, To Place, To Situation, and Follows Commands    5.   Patient arrived from:  home  Facility Name: ___________________________________________    6. If patient is disoriented or from a Skill Nursing Facility has family been notified of admission? No    7. Patient belongings? Belongings: Clothing, eyeglasses, purse    Disposition of belongings? Kept with Patient     8. Any specific patient or family belongings/needs/dynamics?   a. Continent to bowel and bladder    9. Miscellaneous comments/pending orders?  a. Patient normally on 3L of oxygen via nasal cannula, currently on BiPAP     If there are any additional questions please reach out to the Emergency Department.      Benjamin Pradhan, RN  [phone]

## 2025-05-14 NOTE — PROGRESS NOTES
Notified by lab of pCO2 74.3 notified MD and RT    Electronically signed by Anastasia Meeks RN on 5/14/2025 at 1:02 PM

## 2025-05-15 ENCOUNTER — APPOINTMENT (OUTPATIENT)
Dept: GENERAL RADIOLOGY | Age: 65
DRG: 140 | End: 2025-05-15
Payer: MEDICAID

## 2025-05-15 PROBLEM — E87.5 HYPERKALEMIA: Status: ACTIVE | Noted: 2025-05-15

## 2025-05-15 PROBLEM — I10 HYPERTENSION: Status: ACTIVE | Noted: 2025-05-15

## 2025-05-15 PROBLEM — J96.01 ACUTE RESPIRATORY FAILURE WITH HYPOXIA AND HYPERCAPNIA (HCC): Status: ACTIVE | Noted: 2023-12-02

## 2025-05-15 PROBLEM — E87.8 ELECTROLYTE IMBALANCE: Status: ACTIVE | Noted: 2025-05-15

## 2025-05-15 LAB
ANION GAP SERPL CALCULATED.3IONS-SCNC: 6 MMOL/L (ref 3–16)
ANION GAP SERPL CALCULATED.3IONS-SCNC: 9 MMOL/L (ref 3–16)
BASE EXCESS BLDA CALC-SCNC: 11 MMOL/L (ref -3–3)
BASE EXCESS BLDA CALC-SCNC: 12 MMOL/L (ref -3–3)
BASE EXCESS BLDA CALC-SCNC: 6.9 MMOL/L (ref -3–3)
BUN SERPL-MCNC: 17 MG/DL (ref 7–20)
BUN SERPL-MCNC: 18 MG/DL (ref 7–20)
CA-I BLD-SCNC: 1.3 MMOL/L (ref 1.12–1.32)
CALCIUM SERPL-MCNC: 9.3 MG/DL (ref 8.3–10.6)
CALCIUM SERPL-MCNC: 9.6 MG/DL (ref 8.3–10.6)
CHLORIDE SERPL-SCNC: 98 MMOL/L (ref 99–110)
CHLORIDE SERPL-SCNC: 99 MMOL/L (ref 99–110)
CO2 BLDA-SCNC: 38 MMOL/L
CO2 BLDA-SCNC: 40 MMOL/L
CO2 BLDA-SCNC: 42 MMOL/L
CO2 SERPL-SCNC: 32 MMOL/L (ref 21–32)
CO2 SERPL-SCNC: 34 MMOL/L (ref 21–32)
COHGB MFR BLDA: 1.2 % (ref 0–1.5)
CREAT SERPL-MCNC: 0.7 MG/DL (ref 0.6–1.2)
CREAT SERPL-MCNC: 0.8 MG/DL (ref 0.6–1.2)
GFR SERPLBLD CREATININE-BSD FMLA CKD-EPI: 82 ML/MIN/{1.73_M2}
GFR SERPLBLD CREATININE-BSD FMLA CKD-EPI: >90 ML/MIN/{1.73_M2}
GLUCOSE BLD-MCNC: 140 MG/DL (ref 70–99)
GLUCOSE BLD-MCNC: 156 MG/DL (ref 70–99)
GLUCOSE BLD-MCNC: 160 MG/DL (ref 70–99)
GLUCOSE BLD-MCNC: 176 MG/DL (ref 70–99)
GLUCOSE BLD-MCNC: 176 MG/DL (ref 70–99)
GLUCOSE BLD-MCNC: 183 MG/DL (ref 70–99)
GLUCOSE BLD-MCNC: 230 MG/DL (ref 70–99)
GLUCOSE BLD-MCNC: 317 MG/DL (ref 70–99)
GLUCOSE BLD-MCNC: 99 MG/DL (ref 70–99)
GLUCOSE SERPL-MCNC: 141 MG/DL (ref 70–99)
GLUCOSE SERPL-MCNC: 154 MG/DL (ref 70–99)
HCO3 BLDA-SCNC: 35.7 MMOL/L (ref 21–29)
HCO3 BLDA-SCNC: 38 MMOL/L (ref 21–29)
HCO3 BLDA-SCNC: 39.3 MMOL/L (ref 21–29)
HGB BLDA-MCNC: 12.7 G/DL
LACTATE BLD-SCNC: 1.08 MMOL/L (ref 0.4–2)
METHGB MFR BLDA: 0 % (ref 0–1.4)
PCO2 BLDA: 61.3 MM HG (ref 35–45)
PCO2 BLDA: 90.3 MMHG (ref 35–45)
PCO2 BLDA: 91.9 MM HG (ref 35–45)
PERFORMED ON: ABNORMAL
PH BLDA: 7.23 [PH] (ref 7.35–7.45)
PH BLDA: 7.24 [PH] (ref 7.35–7.45)
PH BLDA: 7.37 [PH] (ref 7.35–7.45)
PO2 BLDA: 68.6 MM HG (ref 75–108)
PO2 BLDA: 86.5 MMHG (ref 75–108)
PO2 BLDA: 87.6 MM HG (ref 75–108)
POC SAMPLE TYPE: ABNORMAL
POC SAMPLE TYPE: ABNORMAL
POTASSIUM BLD-SCNC: 5.2 MMOL/L (ref 3.5–5.1)
POTASSIUM SERPL-SCNC: 5.3 MMOL/L (ref 3.5–5.1)
POTASSIUM SERPL-SCNC: 6.3 MMOL/L (ref 3.5–5.1)
POTASSIUM SERPL-SCNC: 6.5 MMOL/L (ref 3.5–5.1)
SAO2 % BLDA: 88 % (ref 93–100)
SAO2 % BLDA: 96 % (ref 93–100)
SAO2 % BLDA: 97 % (ref 93–100)
SODIUM BLD-SCNC: 141 MMOL/L (ref 136–145)
SODIUM SERPL-SCNC: 139 MMOL/L (ref 136–145)
SODIUM SERPL-SCNC: 139 MMOL/L (ref 136–145)

## 2025-05-15 PROCEDURE — 71045 X-RAY EXAM CHEST 1 VIEW: CPT

## 2025-05-15 PROCEDURE — 94640 AIRWAY INHALATION TREATMENT: CPT

## 2025-05-15 PROCEDURE — 2580000003 HC RX 258

## 2025-05-15 PROCEDURE — 2700000000 HC OXYGEN THERAPY PER DAY

## 2025-05-15 PROCEDURE — 6360000002 HC RX W HCPCS

## 2025-05-15 PROCEDURE — 94660 CPAP INITIATION&MGMT: CPT

## 2025-05-15 PROCEDURE — 80048 BASIC METABOLIC PNL TOTAL CA: CPT

## 2025-05-15 PROCEDURE — 83605 ASSAY OF LACTIC ACID: CPT

## 2025-05-15 PROCEDURE — 6360000002 HC RX W HCPCS: Performed by: INTERNAL MEDICINE

## 2025-05-15 PROCEDURE — 5A1945Z RESPIRATORY VENTILATION, 24-96 CONSECUTIVE HOURS: ICD-10-PCS | Performed by: INTERNAL MEDICINE

## 2025-05-15 PROCEDURE — 31500 INSERT EMERGENCY AIRWAY: CPT | Performed by: INTERNAL MEDICINE

## 2025-05-15 PROCEDURE — 74018 RADEX ABDOMEN 1 VIEW: CPT

## 2025-05-15 PROCEDURE — 94002 VENT MGMT INPAT INIT DAY: CPT

## 2025-05-15 PROCEDURE — 99291 CRITICAL CARE FIRST HOUR: CPT | Performed by: INTERNAL MEDICINE

## 2025-05-15 PROCEDURE — 2580000003 HC RX 258: Performed by: INTERNAL MEDICINE

## 2025-05-15 PROCEDURE — 31500 INSERT EMERGENCY AIRWAY: CPT

## 2025-05-15 PROCEDURE — 6360000002 HC RX W HCPCS: Performed by: NURSE PRACTITIONER

## 2025-05-15 PROCEDURE — 0BH17EZ INSERTION OF ENDOTRACHEAL AIRWAY INTO TRACHEA, VIA NATURAL OR ARTIFICIAL OPENING: ICD-10-PCS | Performed by: INTERNAL MEDICINE

## 2025-05-15 PROCEDURE — 2000000000 HC ICU R&B

## 2025-05-15 PROCEDURE — 2580000003 HC RX 258: Performed by: NURSE PRACTITIONER

## 2025-05-15 PROCEDURE — 94761 N-INVAS EAR/PLS OXIMETRY MLT: CPT

## 2025-05-15 PROCEDURE — 6370000000 HC RX 637 (ALT 250 FOR IP): Performed by: NURSE PRACTITIONER

## 2025-05-15 PROCEDURE — 0BJ08ZZ INSPECTION OF TRACHEOBRONCHIAL TREE, VIA NATURAL OR ARTIFICIAL OPENING ENDOSCOPIC: ICD-10-PCS | Performed by: INTERNAL MEDICINE

## 2025-05-15 PROCEDURE — 31622 DX BRONCHOSCOPE/WASH: CPT | Performed by: INTERNAL MEDICINE

## 2025-05-15 PROCEDURE — 82330 ASSAY OF CALCIUM: CPT

## 2025-05-15 PROCEDURE — 2500000003 HC RX 250 WO HCPCS: Performed by: INTERNAL MEDICINE

## 2025-05-15 PROCEDURE — 6370000000 HC RX 637 (ALT 250 FOR IP): Performed by: INTERNAL MEDICINE

## 2025-05-15 PROCEDURE — 36415 COLL VENOUS BLD VENIPUNCTURE: CPT

## 2025-05-15 PROCEDURE — 82947 ASSAY GLUCOSE BLOOD QUANT: CPT

## 2025-05-15 PROCEDURE — 2500000003 HC RX 250 WO HCPCS

## 2025-05-15 PROCEDURE — 99223 1ST HOSP IP/OBS HIGH 75: CPT | Performed by: INTERNAL MEDICINE

## 2025-05-15 PROCEDURE — 82803 BLOOD GASES ANY COMBINATION: CPT

## 2025-05-15 PROCEDURE — 36600 WITHDRAWAL OF ARTERIAL BLOOD: CPT

## 2025-05-15 PROCEDURE — 84295 ASSAY OF SERUM SODIUM: CPT

## 2025-05-15 PROCEDURE — 84132 ASSAY OF SERUM POTASSIUM: CPT

## 2025-05-15 RX ORDER — PROPOFOL 10 MG/ML
5-50 INJECTION, EMULSION INTRAVENOUS CONTINUOUS
Status: DISCONTINUED | OUTPATIENT
Start: 2025-05-15 | End: 2025-05-17

## 2025-05-15 RX ORDER — CALCIUM GLUCONATE 20 MG/ML
1000 INJECTION, SOLUTION INTRAVENOUS ONCE
Status: COMPLETED | OUTPATIENT
Start: 2025-05-15 | End: 2025-05-15

## 2025-05-15 RX ORDER — PHENYLEPHRINE HCL IN 0.9% NACL 1 MG/10 ML
SYRINGE (ML) INTRAVENOUS
Status: DISCONTINUED
Start: 2025-05-15 | End: 2025-05-16

## 2025-05-15 RX ORDER — ROCURONIUM BROMIDE 10 MG/ML
1 INJECTION, SOLUTION INTRAVENOUS ONCE
Status: DISCONTINUED | OUTPATIENT
Start: 2025-05-15 | End: 2025-05-16

## 2025-05-15 RX ORDER — DEXTROSE MONOHYDRATE 100 MG/ML
INJECTION, SOLUTION INTRAVENOUS CONTINUOUS PRN
Status: DISCONTINUED | OUTPATIENT
Start: 2025-05-15 | End: 2025-05-22 | Stop reason: HOSPADM

## 2025-05-15 RX ORDER — SODIUM CHLORIDE 9 MG/ML
INJECTION, SOLUTION INTRAVENOUS CONTINUOUS
Status: DISCONTINUED | OUTPATIENT
Start: 2025-05-15 | End: 2025-05-16

## 2025-05-15 RX ORDER — FENTANYL CITRATE-0.9 % NACL/PF 10 MCG/ML
25-200 PLASTIC BAG, INJECTION (ML) INTRAVENOUS CONTINUOUS
Refills: 0 | Status: DISCONTINUED | OUTPATIENT
Start: 2025-05-15 | End: 2025-05-19

## 2025-05-15 RX ORDER — ETOMIDATE 2 MG/ML
INJECTION INTRAVENOUS
Status: COMPLETED
Start: 2025-05-15 | End: 2025-05-15

## 2025-05-15 RX ORDER — ETOMIDATE 2 MG/ML
30 INJECTION INTRAVENOUS ONCE
Status: COMPLETED | OUTPATIENT
Start: 2025-05-15 | End: 2025-05-15

## 2025-05-15 RX ORDER — LORAZEPAM 2 MG/ML
1 INJECTION INTRAMUSCULAR
Status: COMPLETED | OUTPATIENT
Start: 2025-05-15 | End: 2025-05-15

## 2025-05-15 RX ORDER — PROPOFOL 10 MG/ML
INJECTION, EMULSION INTRAVENOUS
Status: COMPLETED
Start: 2025-05-15 | End: 2025-05-15

## 2025-05-15 RX ORDER — FUROSEMIDE 10 MG/ML
20 INJECTION INTRAMUSCULAR; INTRAVENOUS ONCE
Status: COMPLETED | OUTPATIENT
Start: 2025-05-15 | End: 2025-05-15

## 2025-05-15 RX ADMIN — PROPOFOL 20 MCG/KG/MIN: 10 INJECTION, EMULSION INTRAVENOUS at 15:02

## 2025-05-15 RX ADMIN — IPRATROPIUM BROMIDE AND ALBUTEROL SULFATE 1 DOSE: 2.5; .5 SOLUTION RESPIRATORY (INHALATION) at 12:50

## 2025-05-15 RX ADMIN — Medication 50 MCG/HR: at 15:53

## 2025-05-15 RX ADMIN — WATER 40 MG: 1 INJECTION INTRAMUSCULAR; INTRAVENOUS; SUBCUTANEOUS at 11:28

## 2025-05-15 RX ADMIN — SODIUM CHLORIDE: 0.9 INJECTION, SOLUTION INTRAVENOUS at 11:26

## 2025-05-15 RX ADMIN — ETOMIDATE 20 MG: 2 INJECTION INTRAVENOUS at 15:06

## 2025-05-15 RX ADMIN — PROPOFOL 25 MCG/KG/MIN: 10 INJECTION, EMULSION INTRAVENOUS at 21:02

## 2025-05-15 RX ADMIN — IPRATROPIUM BROMIDE AND ALBUTEROL SULFATE 1 DOSE: 2.5; .5 SOLUTION RESPIRATORY (INHALATION) at 08:17

## 2025-05-15 RX ADMIN — SODIUM CHLORIDE: 0.9 INJECTION, SOLUTION INTRAVENOUS at 17:48

## 2025-05-15 RX ADMIN — ARFORMOTEROL TARTRATE 15 MCG: 15 SOLUTION RESPIRATORY (INHALATION) at 21:59

## 2025-05-15 RX ADMIN — WATER 40 MG: 1 INJECTION INTRAMUSCULAR; INTRAVENOUS; SUBCUTANEOUS at 00:16

## 2025-05-15 RX ADMIN — INSULIN HUMAN 10 UNITS: 100 INJECTION, SOLUTION PARENTERAL at 06:04

## 2025-05-15 RX ADMIN — DEXTROSE 250 ML: 10 SOLUTION INTRAVENOUS at 06:17

## 2025-05-15 RX ADMIN — BUDESONIDE 500 MCG: 0.5 SUSPENSION RESPIRATORY (INHALATION) at 21:59

## 2025-05-15 RX ADMIN — BUDESONIDE 500 MCG: 0.5 SUSPENSION RESPIRATORY (INHALATION) at 08:17

## 2025-05-15 RX ADMIN — ARFORMOTEROL TARTRATE 15 MCG: 15 SOLUTION RESPIRATORY (INHALATION) at 08:17

## 2025-05-15 RX ADMIN — ENOXAPARIN SODIUM 40 MG: 100 INJECTION SUBCUTANEOUS at 11:34

## 2025-05-15 RX ADMIN — LORAZEPAM 1 MG: 2 INJECTION INTRAMUSCULAR; INTRAVENOUS at 01:59

## 2025-05-15 RX ADMIN — CALCIUM GLUCONATE 1000 MG: 20 INJECTION, SOLUTION INTRAVENOUS at 06:22

## 2025-05-15 RX ADMIN — INSULIN LISPRO 2 UNITS: 100 INJECTION, SOLUTION INTRAVENOUS; SUBCUTANEOUS at 17:33

## 2025-05-15 RX ADMIN — SODIUM ZIRCONIUM CYCLOSILICATE 10 G: 10 POWDER, FOR SUSPENSION ORAL at 06:08

## 2025-05-15 RX ADMIN — IPRATROPIUM BROMIDE AND ALBUTEROL SULFATE 1 DOSE: 2.5; .5 SOLUTION RESPIRATORY (INHALATION) at 15:50

## 2025-05-15 RX ADMIN — FUROSEMIDE 20 MG: 10 INJECTION, SOLUTION INTRAMUSCULAR; INTRAVENOUS at 16:57

## 2025-05-15 RX ADMIN — AZITHROMYCIN DIHYDRATE 500 MG: 500 INJECTION, POWDER, LYOPHILIZED, FOR SOLUTION INTRAVENOUS at 11:27

## 2025-05-15 RX ADMIN — IPRATROPIUM BROMIDE AND ALBUTEROL SULFATE 1 DOSE: 2.5; .5 SOLUTION RESPIRATORY (INHALATION) at 21:59

## 2025-05-15 ASSESSMENT — PULMONARY FUNCTION TESTS
PIF_VALUE: 36
PIF_VALUE: 31
PIF_VALUE: 28
PIF_VALUE: 29
PIF_VALUE: 36
PIF_VALUE: 29
PIF_VALUE: 28
PIF_VALUE: 31
PIF_VALUE: 26
PIF_VALUE: 31
PIF_VALUE: 29
PIF_VALUE: 30
PIF_VALUE: 29
PIF_VALUE: 28
PIF_VALUE: 29
PIF_VALUE: 26
PIF_VALUE: 26
PIF_VALUE: 32
PIF_VALUE: 32
PIF_VALUE: 28
PIF_VALUE: 33
PIF_VALUE: 29
PIF_VALUE: 26
PIF_VALUE: 26
PIF_VALUE: 32
PIF_VALUE: 25
PIF_VALUE: 31
PIF_VALUE: 27
PIF_VALUE: 37
PIF_VALUE: 33

## 2025-05-15 ASSESSMENT — PAIN SCALES - GENERAL
PAINLEVEL_OUTOF10: 0
PAINLEVEL_OUTOF10: 0

## 2025-05-15 NOTE — CONSULTS
Nephrology Consult Note                                                                                                                                                                                                                                                                                                                                                               Office : [phone]     Fax :[phone]    Patient's Name: Susanne Jason  10:24 AM  5/15/2025    Reason for Consult:  Hyperkalemia   Requesting Physician:  No primary care provider on file.  Chief Complaint:    Chief Complaint   Patient presents with    Respiratory Distress     Pt been having difficulty breathing since this morning, on 3 lpm oxygen at baseline but spo2 is just 75%        Assessment/Plan     # Hyperkalemia   - Give Lasix 20 mg x 1 and start on IVF at 100 mL/hr  - Place on low K diet   - Hold ARB  - Monitor     # Acute on chronic hypoxic respiratory failure  # COPD exacerbation  - On BiPAP  - Pulmonary on board    # HTN  - BP's overall controlled  - Monitor     # DM2  - Insulin management per primary       History of Present Ilness:    Susanne Jason is a 65 y.o. female with a PMH of COPD, chronic respiratory failure, HLD, HTN, and DM2, who presents with acute on chronic hypoxic respiratory failure secondary to COPD exacerbation. We are now being consulted for hyperkalemia.     Interval hx:    On BiPAP  Very anxious       Past Medical History:   Diagnosis Date    Asthma     COPD (chronic obstructive pulmonary disease) (HCC)     Diabetes mellitus (HCC)     GERD (gastroesophageal reflux disease)        Past Surgical History:   Procedure Laterality Date    COLONOSCOPY      LAPAROSCOPY      OTHER SURGICAL HISTORY  1/9/2015    PLANTAR FASCIOTOMY RIGHT FOOT; STEROID INJECTION LEFT HEEL    OTHER SURGICAL HISTORY      patient states surgery for  stomach ulcer    PLANTAR FASCIA SURGERY Left 3/30/16    ENDOSCOPIC PLANTAR FASCIOTOMY  for input(s): \"LABCAST\", \"BACTERIA\", \"COMU\", \"HYALCAST\", \"WBCUA\", \"RBCUA\" in the last 72 hours.    Invalid input(s): \"EPIU\"  Urine Culture: No results for input(s): \"LABURIN\" in the last 72 hours.  Urine Chemistry: No results for input(s): \"CLUR\", \"LABCREA\", \"PROTEINUR\", \"NAUR\" in the last 72 hours.      IMAGING:  CTA PULMONARY W CONTRAST   Final Result   1.  No pulmonary emboli. No acute cardiopulmonary findings.   2.  Moderate hiatal hernia.      Electronically signed by [employer]ogen[employer]      XR CHEST PORTABLE   Final Result   1.  No acute cardiopulmonary findings.      Electronically signed by Sonogenixnazia            Medical Decision Making:  The following items were considered in medical decision making:  Discussion of patient care with other providers  Reviewed clinical lab tests  Reviewed radiology tests  Reviewed other diagnostic tests/interventions    Will be discussed w/  Primary team     Thank you for allowing us to participate in care of Susanne LORI Jason   Feel free to contact me,     Angely Watters PA-C   [facility]  Office : [phone] or through [employer]  Fax :[phone]       I have seen the patient independently from the PA/NP .I discussed the care with the PA/NP . I personally reviewed the HPI, PH, FH, SH, ROS and medications. I repeated pertinent portions of the examination and reviewed the relevant imaging and laboratory data. I agree with the findings, assessment and plan as documented, with the following addendum:        Benton Watson MD

## 2025-05-15 NOTE — PROGRESS NOTES
Physical Therapy/Occupational Therapy   Hold  Orders received and chart reviewed. Per d/w RN pt is on BIPAP and inappropriate for therapy evals at this time. PT/OT will attempt to evaluate pt at later time vs later date as appropriate and as schedule permits. RN aware.     Bladimir Whelan, PT, DPT   Priyanka Aj OTR/L #[id number]

## 2025-05-15 NOTE — CONSULTS
ICU CONSULT       Hospital Day:   ICU Day:                                                          Code:Full Code  Admit Date: 5/13/2025  PCP: No primary care provider on file.                                  CC: Dyspnea    HISTORY OF PRESENT ILLNESS:   Patient is a 65 year old female with a history of COPD on 3L O2 baseline, asthma, diabetes, hypertension, HLD who presented for worsening dyspnea. ICU consulted for closer monitoring of hypercarbic respiratory failure.    Patient initially presented to the ED on 5/13 with 2-3 days of worsening shortness of breath with cough. Her cough also was associated with yellow sputum. Had also been reporting fevers at home. Her SpO2 was in mid 70s at the time. She was started on continuous nebulizer tx in ED, given steroids and magnesium. Initial labs with pH 7.2 with pCO2 of 87.5. CXR with no evidence of pna. Troponin and BNP unremarkable. She was admitted to the PCU and started on BiPAP with duonebs, solumedrol 40 mg IV BID. Pulmonology following.    Since initiation of BiPAP, her respiratory symptoms have not significantly improved. Her pH and pCO2 initially began to improve, best was 7.27 / 74.3. However, on 5/15, began to once again worsen, pH 7.228.    Rapid response was called at approx 1330 on 5/15 for worsening somnolence. At that time, she was on AVAPS. She answers questions but is acutely confused, flailing extremities, more somnolent, which had not reportedly been the case prior. ABG was not acutely worse. However, given her tenuous respiratory status, she was transferred to ICU for closer monitoring. Upon transfer to the ICU, she became hypoxic, SpO2 60s, ultimately requiring intubation.    Pt completed HCPOA paperwork this admission. I spoke with her POA -- patient is FULL CODE.     PAST HISTORY:     Past Medical History:   Diagnosis Date    Asthma     COPD (chronic obstructive pulmonary disease) (HCC)     Diabetes mellitus (HCC)     GERD (gastroesophageal  hours  - Continue home neb    Neuro  - Sedated with propofol    Metabolic / Renal  # Hyperkalemia (improving)  Potassium 6.5 this morning, which improved to 5.3 this afternoon.  - Nephrology following  - s/p lokelma 10g. Lasix not given    # HLD  - home crestor    # T2DM  - MDSSI  - POCT glucose checks    GI  # GERD  - home protonix    Code Status:Full Code  FEN: ADULT DIET; Regular; 4 carb choices (60 gm/meal); Low Potassium (Less than 3000 mg/day)   PPX:  Lovenox  DISPO: TBD    This patient has been staffed and discussed with Dr. Chopra  -----------------------------  Colton Guerra MD, PGY-1  Internal Medicine Resident  5/15/2025  1:58 PM

## 2025-05-15 NOTE — PROGRESS NOTES
4 Eyes Skin Assessment     NAME:  Susanne Jason  YOB: 1960  MEDICAL RECORD NUMBER:  [id number]    The patient is being assessed for  Transfer to New Unit    I agree that at least one RN has performed a thorough Head to Toe Skin Assessment on the patient. ALL assessment sites listed below have been assessed.      Areas assessed by both nurses:    Head, Face, Ears, Shoulders, Back, Chest, Arms, Elbows, Hands, Sacrum. Buttock, Coccyx, Ischium, Legs. Feet and Heels, Under Medical Devices , and Other          Does the Patient have a Wound? Redness face.       Severino Prevention initiated by RN: Yes  Wound Care Orders initiated by RN: Yes    Pressure Injury (Stage 3,4, Unstageable, DTI, NWPT, and Complex wounds) if present, place Wound referral order by RN under : No    New Ostomies, if present place, Ostomy referral order under : No     Nurse 1 eSignature: Electronically signed by Zayda Garcia RN on 5/15/25 at 7:23 PM EDT    **SHARE this note so that the co-signing nurse can place an eSignature**    Nurse 2 eSignature: Electronically signed by Mary Bridges RN on 5/16/25 at 11:40 AM EDT

## 2025-05-15 NOTE — PROGRESS NOTES
Rapid response called at 1333 d/t increased lethargy.  Abg ordered and collected.  Transfer orders placed and transported pt to room 4516 at 1400

## 2025-05-15 NOTE — PROCEDURES
PROCEDURE NOTE  Date: 5/15/2025   Name: Susanne Jason  YOB: 1960    Procedures    Intubation:    Indication: acute respiratory failure    Anesthesia: Etomidate 20 mg IV  ASA IV  Mallampati: unable to assess due to acute nature of procedure     Procedure:  Patient was preoxygenated with Ambu bag and 100% O2 for about three minutes. All equipments was checked.    Glidescope was inserted into the oral cavity and placed in proper position.  Vocal cords were visualized.   ETT size 8.0 was passed through the vocal cords and stylet was removed.    There was bilateral breath sounds and positive CO2 colorimetric detector.    Tube was fixed at 23cm at the lip.   There was no immediate complication.     EBL: minimal

## 2025-05-15 NOTE — PROGRESS NOTES
Pt intubated due to respiratory failure. Size 4 Glidescope used to visualize vocal chords. Size 8.0 mm ET tube placed through the vocal chords to 23 cm at the lips. ET tube cuff inflated to MOV. Positive color change on the etco2 detector noted. Bilateral breath sounds heard. X-ray called to confirm placement. Pt placed on mechanical ventilator with ordered settings. No visible trauma noted. Pt resting comfortably.

## 2025-05-15 NOTE — PROCEDURES
PROCEDURE NOTE  Date: 5/15/2025   Name: Susanne Jason  YOB: 1960    Procedures    PROCEDURE: Fiberoptic bronchoscopy     Preprocedure diagnosis: High peak pressure   Post procedure diagnosis: bronchomalacia, mild thick white secretions      DESCRIPTION OF PROCEDURE: Informed consent was obtained from the patient after explaining the risks and benefits. A time out was taken.    Mallampati: intubated   ASA IV  Anesthesia:       Propofol, ICU protocol     Bronchoscope was inserted into the main airway via the ETT.    Lower trachea and bronchial trees were examined to the segmental level.   There was significant tracheomalacia with coughing with ~90% narrowing of trachea.  There was clear to white secretions bilaterally.  There was mild creamy subsegmental plugging in some of LLL and RLL subsegments.  There was no endobronchial masses or lesions.  Mucosa was mildly inflamed.      The patient tolerated the procedure well.  No immediate complication    EBL: none

## 2025-05-15 NOTE — PROGRESS NOTES
Critical CO2 90.3.  notified RT who states they will speak with michelle CHIU    Electronically signed by Anastasia Meeks RN on 5/15/2025 at 11:38 AM

## 2025-05-15 NOTE — PLAN OF CARE
Problem: Chronic Conditions and Co-morbidities  Goal: Patient's chronic conditions and co-morbidity symptoms are monitored and maintained or improved  Outcome: Progressing  Flowsheets (Taken 5/15/2025 0246)  Care Plan - Patient's Chronic Conditions and Co-Morbidity Symptoms are Monitored and Maintained or Improved:   Monitor and assess patient's chronic conditions and comorbid symptoms for stability, deterioration, or improvement   Collaborate with multidisciplinary team to address chronic and comorbid conditions and prevent exacerbation or deterioration   Update acute care plan with appropriate goals if chronic or comorbid symptoms are exacerbated and prevent overall improvement and discharge     Problem: Discharge Planning  Goal: Discharge to home or other facility with appropriate resources  Outcome: Progressing  Flowsheets (Taken 5/15/2025 0246)  Discharge to home or other facility with appropriate resources:   Identify barriers to discharge with patient and caregiver   Arrange for needed discharge resources and transportation as appropriate   Identify discharge learning needs (meds, wound care, etc)   Refer to discharge planning if patient needs post-hospital services based on physician order or complex needs related to functional status, cognitive ability or social support system     Problem: Safety - Adult  Goal: Free from fall injury  Outcome: Progressing  Flowsheets (Taken 5/15/2025 0246)  Free From Fall Injury: Instruct family/caregiver on patient safety     Problem: Pain  Goal: Verbalizes/displays adequate comfort level or baseline comfort level  Outcome: Progressing  Flowsheets (Taken 5/15/2025 0246)  Verbalizes/displays adequate comfort level or baseline comfort level:   Encourage patient to monitor pain and request assistance   Assess pain using appropriate pain scale   Administer analgesics based on type and severity of pain and evaluate response   Implement non-pharmacological measures as  appropriate and evaluate response   Consider cultural and social influences on pain and pain management   Notify Licensed Independent Practitioner if interventions unsuccessful or patient reports new pain     Problem: ABCDS Injury Assessment  Goal: Absence of physical injury  Outcome: Progressing  Flowsheets (Taken 5/15/2025 0246)  Absence of Physical Injury: Implement safety measures based on patient assessment

## 2025-05-15 NOTE — PROGRESS NOTES
V2.0    [facility] Progress Note      Name:  Susanne Jason /Age/Sex: 1960  (65 y.o. female)   MRN & CSN:  [id number] & [id number] Encounter Date/Time: 5/15/2025 8:17 AM EDT   Location:  [facility]9/4319-01 PCP: No primary care provider on file.     Attending:Adrián Hernandez MD       Hospital Day: 3    HPI:    Assessment and Recommendations     Hospital course:    Susanne Jason is a 65 y.o. female with pmh of COPD, chronic respiratory failure, hyperlipidemia, hypertension, diabetes who presents with COPD (chronic obstructive pulmonary disease) (HCC) exacerbation with acute on chronic hypoxic respiratory failure with acute on chronic hypercarbic respiratory failure      Acute on chronic hypoxic respiratory failure with acute hypercarbic respiratory failure: Present on admission  - Patient hypoxic in the ED needing 15 L of oxygen to maintain sats greater than 97%.  On presentation patient had a pH of 7.2, with CO2 of 80 however she progressively got worse her pH dropped to 7.09 with CO2 greater than 98 and needed to be on BiPAP.   - On 3 L oxygen chronically, for asthma/COPD  - Placed on BiPAP on admission and then high flow nasal cannula; back on bipap which reportedly was kept on most of the night after periods of patient refusal to wear: Appears this has been noted in previous hospitalizations  - Appears was able to wear BiPAP overnight; reportedly with Ativan  - Stat blood gas; to see if improved on BiPAP; or if will need mech ventilation. Will not give ativan.   - Continue management for COPD exacerbation  - Pulmonary following; patient had frequent previous hospitalizations for COPD exacerbations, respiratory failure, requiring intubation- Appears she will need intubation this admit as well: discuss with Pulmonary: following.  - Palliative care consulted in view of her advanced COPD    Severe COPD/severe persistent asthma, current smoker   COPD exacerbation  - Chronically on 3 L oxygen  - Started on

## 2025-05-15 NOTE — PROGRESS NOTES
Pulmonary Progress Note    Patient Name: Susanne Jason   Patient : 1960   Date: 5/15/2025   Admit Date: 2025     CC:Respiratory Distress       Interval History: Pt seen at bedside this am.  Quite somnolent does wake up intermittently to answer some question not appropriately, on review of her med rec patient had received Ativan 1 mg overnight twice and tramadol 50 mg likely may have caused patient to be more somnolent also she was having significant leak from her AVAPS.  She is still responsive to pain.  Later on rapid response was called as patient was found to be more unresponsive and her blood gas did not show significant improvement.  Decision was made to transfer patient to ICU for further management.    HPI:  This is a 65-year-old female with past medical history significant for chronic hypoxic respiratory failure on 3 L oxygen at baseline, asthma, COPD, tobacco dependence, diabetes, hypertension who presented to the ER for shortness of breath.     As per the patient his shortness of breath had been going on for 2 to 3 days and had progressively gotten worse and was accompanied with cough.  She reports her cough was worse than her baseline and was accompanied with yellowish phlegm which is not normal for her as it is usually clear.  She also reported having a fever of 101 however she said that it resolved on its own.  She said that she was not feeling well for at least a week.      On arrival patient was hypoxic to mid 70s while on her 3 L of home oxygen she also had diffuse wheezing present bilaterally.  Her blood pressure was elevated 246/105 she was started on nebulizers with albuterol and was on steroids and magnesium.  Blood gas was significant for pH of 7.2, CO2 of 80, after breathing treatments patient became more somnolent repeat blood gas showed pH of 7.09 down from 7.2 and PCO2 greater than 98 for which she was started on BiPAP that was followed by improvement in her mental  monoxide   poisoning.      6 minute walk test 5/24/24: This 6 minute walk test demonstrates the need for 2L with exertion and at   rest. patient walked 143 m. At rest, oxygen level 84% while on room air.   While breathing 2L, maintained normal oxygenation at rest and with   ambulation. [place] oxygen 93% on 2L with exertion.   Echo12/4/23: Technically difficult examination due to ventilator and patient status.   Left ventricular cavity size is normal. Normal left ventricular wall   thickness. Overall left ventricular systolic function appears normal.   Ejection fraction is visually estimated to be 60-65%. No regional wall   motion abnormalities are noted. Normal diastolic function.   There is mild tricuspid valve regurgitation, no stenosis.   The right ventricle is normal in size and function.   Trivial anterior pericardial effusion noted.    ASSESSMENT/PLAN  Acute on chronic hypoxic hypercapnic respiratory failure 2/2 COPD exacerbation-worsening  Patient had presented due to respiratory distress she has severe obstructive lung disease is requiring oxygen above her baseline of 3 L on presentation patient had a pH of 7.2, with CO2 of 80 however she progressively got worse her pH dropped to 7.09 with CO2 greater than 98 and needed to be on BiPAP.  Patient at home is on budesonide-glycopyrrolate/formoterol 2 puffs twice daily, her blood gas this a.m. is significant for pH of 7.2 and CO2 of 87.5, bicarb of 34 not significantly improved from her last blood gas.  Due to her change in the consistency of her phlegm  and the color of her phlegm also increased oxygen requirement she is meeting criteria for COPD exacerbation    Interval hx: Patient's blood gas from this morning was significant for pH of 7.228, CO2 90.3, bicarb 38 patient was more somnolent during this a.m.  She had received Ativan and tramadol overnight.  Likely might be contributing to her sedation.  Also there was quite a lot of leak from her mask for the

## 2025-05-15 NOTE — PROGRESS NOTES
Updated pts partner over the phone    Electronically signed by Anastasia Meeks RN on 5/15/2025 at 11:00 AM

## 2025-05-15 NOTE — PROGRESS NOTES
Palliative Care Chart Review  and Check in Note:     NAME:  Susanne Jason  Admit Date: 5/13/2025  Hospital Day:  Hospital Day: 3   Current Code status: Full Code    Palliative care is continuing to following Ms. Jason for symptom management,  and goals of care discussion as needed. Patient's chart reviewed today 5/15/25.      Called pt's long term partner Abebe to inform him of HCPOA completed yesterday, no answer, left VM with callback number.     Went to see pt at the bedside, she was resting in bed with eyes closed with bipap on. Did not disturb her as she tends to become agitated and take bipap off.       The following are the currently established goals/code status, and Symptom management.     Goals of care: Pt wants to avoid intubation but is OK with it if necessary. We discussed that she needs to be compliant with bipap if she wants to avoid intubation. We did not get to discuss possible tracheostomy as pt was agitated and unable to be re-directed. Pt's daughter Lisbeth did not think pt would want a trach but was unsure. I encouraged her to discuss with the pt.    We completed a HCPOA yesterday and she named her long term partner Abebe Holden as the primary agent, daughter Lisbeth as the first alternate agent and daughter Dora as the second alternate agent.     I attempted to inform Lisbeth and Abebe but they did not answer the phone and have not called back.     Code status: Full code, discussed with pt in depth 5/14.     Discharge plan:  TBD pending hospital course.       HENRIQUE Singh - CNP  05/15/25  8:31 AM

## 2025-05-15 NOTE — PLAN OF CARE
Problem: Safety - Adult  Goal: Free from fall injury  5/15/2025 1101 by Anastasia Meeks, RN  Outcome: Progressing  Flowsheets (Taken 5/15/2025 1101)  Free From Fall Injury: Instruct family/caregiver on patient safety  Note: All fall precautions in place, call light and bedside table within reach.  Pt on camera for safety      Problem: Pain  Goal: Verbalizes/displays adequate comfort level or baseline comfort level  5/15/2025 1101 by Anastasia Meeks, RN  Outcome: Progressing  Flowsheets (Taken 5/15/2025 1101)  Verbalizes/displays adequate comfort level or baseline comfort level:   Administer analgesics based on type and severity of pain and evaluate response   Encourage patient to monitor pain and request assistance  Note: Encouraged PT to notify RN of any pain

## 2025-05-15 NOTE — PLAN OF CARE
Problem: Chronic Conditions and Co-morbidities  Goal: Patient's chronic conditions and co-morbidity symptoms are monitored and maintained or improved  5/15/2025 1921 by Ronda Barker RN  Outcome: Progressing  5/15/2025 1101 by Anastasia Mekes RN  Outcome: Progressing     Problem: Discharge Planning  Goal: Discharge to home or other facility with appropriate resources  5/15/2025 1921 by Ronda Barker RN  Outcome: Progressing  5/15/2025 1101 by Anastasia Meeks RN  Outcome: Progressing     Problem: Safety - Adult  Goal: Free from fall injury  5/15/2025 1921 by Ronda Barker RN  Outcome: Progressing  Flowsheets (Taken 5/15/2025 1820 by Zayda Garcia RN)  Free From Fall Injury:   Based on caregiver fall risk screen, instruct family/caregiver to ask for assistance with transferring infant if caregiver noted to have fall risk factors   Instruct family/caregiver on patient safety  5/15/2025 1101 by Anastasia Meeks RN  Outcome: Progressing  Flowsheets (Taken 5/15/2025 1101)  Free From Fall Injury: Instruct family/caregiver on patient safety  Note: All fall precautions in place, call light and bedside table within reach.  Pt on camera for safety      Problem: Pain  Goal: Verbalizes/displays adequate comfort level or baseline comfort level  5/15/2025 1921 by Ronda Barker RN  Outcome: Progressing  5/15/2025 1101 by Anastasia Meeks RN  Outcome: Progressing  Flowsheets (Taken 5/15/2025 1101)  Verbalizes/displays adequate comfort level or baseline comfort level:   Administer analgesics based on type and severity of pain and evaluate response   Encourage patient to monitor pain and request assistance  Note: Encouraged PT to notify RN of any pain      Problem: ABCDS Injury Assessment  Goal: Absence of physical injury  5/15/2025 1921 by Ronda Barker RN  Outcome: Progressing  Flowsheets (Taken 5/15/2025 1820 by Zayda Garcia RN)  Absence of Physical Injury: Implement safety measures based on patient

## 2025-05-16 ENCOUNTER — APPOINTMENT (OUTPATIENT)
Dept: GENERAL RADIOLOGY | Age: 65
DRG: 140 | End: 2025-05-16
Payer: MEDICAID

## 2025-05-16 LAB
ALBUMIN SERPL-MCNC: 3.7 G/DL (ref 3.4–5)
ANION GAP SERPL CALCULATED.3IONS-SCNC: 7 MMOL/L (ref 3–16)
BASE EXCESS BLDA CALC-SCNC: 11 MMOL/L (ref -3–3)
BASE EXCESS BLDA CALC-SCNC: 9 MMOL/L (ref -3–3)
BASOPHILS # BLD: 0 K/UL (ref 0–0.2)
BASOPHILS NFR BLD: 0 %
BUN SERPL-MCNC: 32 MG/DL (ref 7–20)
CALCIUM SERPL-MCNC: 8.9 MG/DL (ref 8.3–10.6)
CHLORIDE SERPL-SCNC: 97 MMOL/L (ref 99–110)
CO2 BLDA-SCNC: 38 MMOL/L
CO2 BLDA-SCNC: 39 MMOL/L
CO2 SERPL-SCNC: 31 MMOL/L (ref 21–32)
CREAT SERPL-MCNC: 0.9 MG/DL (ref 0.6–1.2)
DEPRECATED RDW RBC AUTO: 18.7 % (ref 12.4–15.4)
EOSINOPHIL # BLD: 0 K/UL (ref 0–0.6)
EOSINOPHIL NFR BLD: 0 %
GFR SERPLBLD CREATININE-BSD FMLA CKD-EPI: 71 ML/MIN/{1.73_M2}
GLUCOSE BLD-MCNC: 128 MG/DL (ref 70–99)
GLUCOSE BLD-MCNC: 133 MG/DL (ref 70–99)
GLUCOSE BLD-MCNC: 145 MG/DL (ref 70–99)
GLUCOSE BLD-MCNC: 154 MG/DL (ref 70–99)
GLUCOSE BLD-MCNC: 99 MG/DL (ref 70–99)
GLUCOSE SERPL-MCNC: 126 MG/DL (ref 70–99)
HCO3 BLDA-SCNC: 36 MMOL/L (ref 21–29)
HCO3 BLDA-SCNC: 36.7 MMOL/L (ref 21–29)
HCT VFR BLD AUTO: 35.4 % (ref 36–48)
HGB BLD-MCNC: 11.2 G/DL (ref 12–16)
LACTATE BLD-SCNC: 0.55 MMOL/L (ref 0.4–2)
LYMPHOCYTES # BLD: 1.4 K/UL (ref 1–5.1)
LYMPHOCYTES NFR BLD: 11 %
MAGNESIUM SERPL-MCNC: 1.84 MG/DL (ref 1.8–2.4)
MCH RBC QN AUTO: 23.8 PG (ref 26–34)
MCHC RBC AUTO-ENTMCNC: 31.8 G/DL (ref 31–36)
MCV RBC AUTO: 74.9 FL (ref 80–100)
MICROCYTES BLD QL SMEAR: ABNORMAL
MONOCYTES # BLD: 0.3 K/UL (ref 0–1.3)
MONOCYTES NFR BLD: 2 %
NEUTROPHILS # BLD: 11.2 K/UL (ref 1.7–7.7)
NEUTROPHILS NFR BLD: 87 %
OVALOCYTES BLD QL SMEAR: ABNORMAL
PCO2 BLDA: 64 MM HG (ref 35–45)
PCO2 BLDA: 89.4 MM HG (ref 35–45)
PERFORMED ON: ABNORMAL
PERFORMED ON: NORMAL
PH BLDA: 7.22 [PH] (ref 7.35–7.45)
PH BLDA: 7.36 [PH] (ref 7.35–7.45)
PHOSPHATE SERPL-MCNC: 2.8 MG/DL (ref 2.5–4.9)
PLATELET # BLD AUTO: 284 K/UL (ref 135–450)
PMV BLD AUTO: 8.5 FL (ref 5–10.5)
PO2 BLDA: 83.7 MM HG (ref 75–108)
PO2 BLDA: 99 MM HG (ref 75–108)
POC SAMPLE TYPE: ABNORMAL
POC SAMPLE TYPE: ABNORMAL
POTASSIUM BLD-SCNC: 4.9 MMOL/L (ref 3.5–5.1)
POTASSIUM SERPL-SCNC: 5.3 MMOL/L (ref 3.5–5.1)
RBC # BLD AUTO: 4.72 M/UL (ref 4–5.2)
SAO2 % BLDA: 93 % (ref 93–100)
SAO2 % BLDA: 97 % (ref 93–100)
SCHISTOCYTES BLD QL SMEAR: ABNORMAL
SODIUM SERPL-SCNC: 135 MMOL/L (ref 136–145)
WBC # BLD AUTO: 12.9 K/UL (ref 4–11)

## 2025-05-16 PROCEDURE — 80069 RENAL FUNCTION PANEL: CPT

## 2025-05-16 PROCEDURE — 6370000000 HC RX 637 (ALT 250 FOR IP): Performed by: INTERNAL MEDICINE

## 2025-05-16 PROCEDURE — 51701 INSERT BLADDER CATHETER: CPT

## 2025-05-16 PROCEDURE — 83735 ASSAY OF MAGNESIUM: CPT

## 2025-05-16 PROCEDURE — 2500000003 HC RX 250 WO HCPCS: Performed by: INTERNAL MEDICINE

## 2025-05-16 PROCEDURE — 6360000002 HC RX W HCPCS

## 2025-05-16 PROCEDURE — 2700000000 HC OXYGEN THERAPY PER DAY

## 2025-05-16 PROCEDURE — 2000000000 HC ICU R&B

## 2025-05-16 PROCEDURE — 2580000003 HC RX 258

## 2025-05-16 PROCEDURE — 51798 US URINE CAPACITY MEASURE: CPT

## 2025-05-16 PROCEDURE — 84132 ASSAY OF SERUM POTASSIUM: CPT

## 2025-05-16 PROCEDURE — 99233 SBSQ HOSP IP/OBS HIGH 50: CPT | Performed by: INTERNAL MEDICINE

## 2025-05-16 PROCEDURE — 99291 CRITICAL CARE FIRST HOUR: CPT | Performed by: INTERNAL MEDICINE

## 2025-05-16 PROCEDURE — 94003 VENT MGMT INPAT SUBQ DAY: CPT

## 2025-05-16 PROCEDURE — 6360000002 HC RX W HCPCS: Performed by: INTERNAL MEDICINE

## 2025-05-16 PROCEDURE — 94640 AIRWAY INHALATION TREATMENT: CPT

## 2025-05-16 PROCEDURE — 82803 BLOOD GASES ANY COMBINATION: CPT

## 2025-05-16 PROCEDURE — 74018 RADEX ABDOMEN 1 VIEW: CPT

## 2025-05-16 PROCEDURE — 6370000000 HC RX 637 (ALT 250 FOR IP)

## 2025-05-16 PROCEDURE — 36415 COLL VENOUS BLD VENIPUNCTURE: CPT

## 2025-05-16 PROCEDURE — 2500000003 HC RX 250 WO HCPCS

## 2025-05-16 PROCEDURE — 94761 N-INVAS EAR/PLS OXIMETRY MLT: CPT

## 2025-05-16 PROCEDURE — 2580000003 HC RX 258: Performed by: INTERNAL MEDICINE

## 2025-05-16 PROCEDURE — 85025 COMPLETE CBC W/AUTO DIFF WBC: CPT

## 2025-05-16 RX ORDER — POLYETHYLENE GLYCOL 3350 17 G/17G
17 POWDER, FOR SOLUTION ORAL 2 TIMES DAILY
Status: DISCONTINUED | OUTPATIENT
Start: 2025-05-16 | End: 2025-05-22 | Stop reason: HOSPADM

## 2025-05-16 RX ORDER — ALBUTEROL SULFATE 90 UG/1
2 INHALANT RESPIRATORY (INHALATION) EVERY 6 HOURS PRN
COMMUNITY

## 2025-05-16 RX ORDER — SENNA AND DOCUSATE SODIUM 50; 8.6 MG/1; MG/1
2 TABLET, FILM COATED ORAL DAILY PRN
Status: DISCONTINUED | OUTPATIENT
Start: 2025-05-16 | End: 2025-05-16

## 2025-05-16 RX ORDER — SENNA AND DOCUSATE SODIUM 50; 8.6 MG/1; MG/1
2 TABLET, FILM COATED ORAL DAILY
Status: DISCONTINUED | OUTPATIENT
Start: 2025-05-16 | End: 2025-05-22 | Stop reason: HOSPADM

## 2025-05-16 RX ORDER — ALBUTEROL SULFATE 0.83 MG/ML
2.5 SOLUTION RESPIRATORY (INHALATION) EVERY 6 HOURS PRN
Status: DISCONTINUED | OUTPATIENT
Start: 2025-05-16 | End: 2025-05-22 | Stop reason: HOSPADM

## 2025-05-16 RX ORDER — FUROSEMIDE 10 MG/ML
20 INJECTION INTRAMUSCULAR; INTRAVENOUS ONCE
Status: COMPLETED | OUTPATIENT
Start: 2025-05-16 | End: 2025-05-16

## 2025-05-16 RX ORDER — SENNOSIDES A AND B 8.6 MG/1
1 TABLET, FILM COATED ORAL NIGHTLY
Status: DISCONTINUED | OUTPATIENT
Start: 2025-05-16 | End: 2025-05-16

## 2025-05-16 RX ADMIN — DOCUSATE SODIUM 50 MG AND SENNOSIDES 8.6 MG 2 TABLET: 8.6; 5 TABLET, FILM COATED ORAL at 12:02

## 2025-05-16 RX ADMIN — POLYETHYLENE GLYCOL 3350 17 G: 17 POWDER, FOR SOLUTION ORAL at 21:57

## 2025-05-16 RX ADMIN — PROPOFOL 25 MCG/KG/MIN: 10 INJECTION, EMULSION INTRAVENOUS at 05:36

## 2025-05-16 RX ADMIN — BUDESONIDE 500 MCG: 0.5 SUSPENSION RESPIRATORY (INHALATION) at 08:17

## 2025-05-16 RX ADMIN — SODIUM CHLORIDE, PRESERVATIVE FREE 10 ML: 5 INJECTION INTRAVENOUS at 08:44

## 2025-05-16 RX ADMIN — WATER 40 MG: 1 INJECTION INTRAMUSCULAR; INTRAVENOUS; SUBCUTANEOUS at 12:15

## 2025-05-16 RX ADMIN — AZITHROMYCIN MONOHYDRATE 500 MG: 500 INJECTION, POWDER, LYOPHILIZED, FOR SOLUTION INTRAVENOUS at 12:06

## 2025-05-16 RX ADMIN — Medication 75 MCG/HR: at 23:53

## 2025-05-16 RX ADMIN — ARFORMOTEROL TARTRATE 15 MCG: 15 SOLUTION RESPIRATORY (INHALATION) at 08:17

## 2025-05-16 RX ADMIN — Medication 50 MCG/HR: at 05:37

## 2025-05-16 RX ADMIN — BUDESONIDE 500 MCG: 0.5 SUSPENSION RESPIRATORY (INHALATION) at 20:15

## 2025-05-16 RX ADMIN — POLYETHYLENE GLYCOL 3350 17 G: 17 POWDER, FOR SOLUTION ORAL at 12:02

## 2025-05-16 RX ADMIN — IPRATROPIUM BROMIDE AND ALBUTEROL SULFATE 1 DOSE: 2.5; .5 SOLUTION RESPIRATORY (INHALATION) at 08:17

## 2025-05-16 RX ADMIN — SODIUM CHLORIDE, PRESERVATIVE FREE 40 MG: 5 INJECTION INTRAVENOUS at 08:44

## 2025-05-16 RX ADMIN — WATER 40 MG: 1 INJECTION INTRAMUSCULAR; INTRAVENOUS; SUBCUTANEOUS at 00:08

## 2025-05-16 RX ADMIN — ENOXAPARIN SODIUM 40 MG: 100 INJECTION SUBCUTANEOUS at 08:44

## 2025-05-16 RX ADMIN — FUROSEMIDE 20 MG: 10 INJECTION, SOLUTION INTRAMUSCULAR; INTRAVENOUS at 12:02

## 2025-05-16 RX ADMIN — IPRATROPIUM BROMIDE AND ALBUTEROL SULFATE 1 DOSE: 2.5; .5 SOLUTION RESPIRATORY (INHALATION) at 11:45

## 2025-05-16 RX ADMIN — SODIUM CHLORIDE: 0.9 INJECTION, SOLUTION INTRAVENOUS at 03:01

## 2025-05-16 RX ADMIN — ROSUVASTATIN CALCIUM 20 MG: 20 TABLET, FILM COATED ORAL at 08:44

## 2025-05-16 RX ADMIN — ARFORMOTEROL TARTRATE 15 MCG: 15 SOLUTION RESPIRATORY (INHALATION) at 20:15

## 2025-05-16 RX ADMIN — PROPOFOL 30 MCG/KG/MIN: 10 INJECTION, EMULSION INTRAVENOUS at 19:59

## 2025-05-16 RX ADMIN — ALBUTEROL SULFATE 2.5 MG: 2.5 SOLUTION RESPIRATORY (INHALATION) at 16:48

## 2025-05-16 RX ADMIN — PROPOFOL 40 MCG/KG/MIN: 10 INJECTION, EMULSION INTRAVENOUS at 14:50

## 2025-05-16 RX ADMIN — IPRATROPIUM BROMIDE AND ALBUTEROL SULFATE 1 DOSE: 2.5; .5 SOLUTION RESPIRATORY (INHALATION) at 20:15

## 2025-05-16 ASSESSMENT — PULMONARY FUNCTION TESTS
PIF_VALUE: 28
PIF_VALUE: 28
PIF_VALUE: 32
PIF_VALUE: 30
PIF_VALUE: 31
PIF_VALUE: 29
PIF_VALUE: 33
PIF_VALUE: 28
PIF_VALUE: 32
PIF_VALUE: 25
PIF_VALUE: 32
PIF_VALUE: 28
PIF_VALUE: 27
PIF_VALUE: 29
PIF_VALUE: 27
PIF_VALUE: 31
PIF_VALUE: 28
PIF_VALUE: 28
PIF_VALUE: 27
PIF_VALUE: 29
PIF_VALUE: 29
PIF_VALUE: 30
PIF_VALUE: 28
PIF_VALUE: 28
PIF_VALUE: 29
PIF_VALUE: 28
PIF_VALUE: 38
PIF_VALUE: 28
PIF_VALUE: 31
PIF_VALUE: 37
PIF_VALUE: 27
PIF_VALUE: 32
PIF_VALUE: 31
PIF_VALUE: 31
PIF_VALUE: 29
PIF_VALUE: 28
PIF_VALUE: 31
PIF_VALUE: 27
PIF_VALUE: 29
PIF_VALUE: 28
PIF_VALUE: 28
PIF_VALUE: 33
PIF_VALUE: 30
PIF_VALUE: 27
PIF_VALUE: 28
PIF_VALUE: 24
PIF_VALUE: 27
PIF_VALUE: 31
PIF_VALUE: 29
PIF_VALUE: 32
PIF_VALUE: 24
PIF_VALUE: 29
PIF_VALUE: 31
PIF_VALUE: 32
PIF_VALUE: 30
PIF_VALUE: 28
PIF_VALUE: 31
PIF_VALUE: 32
PIF_VALUE: 31
PIF_VALUE: 31
PIF_VALUE: 34
PIF_VALUE: 35
PIF_VALUE: 31
PIF_VALUE: 28
PIF_VALUE: 32
PIF_VALUE: 27
PIF_VALUE: 27
PIF_VALUE: 31
PIF_VALUE: 29
PIF_VALUE: 27
PIF_VALUE: 31
PIF_VALUE: 27
PIF_VALUE: 28
PIF_VALUE: 27
PIF_VALUE: 29
PIF_VALUE: 36
PIF_VALUE: 29
PIF_VALUE: 28
PIF_VALUE: 29
PIF_VALUE: 32

## 2025-05-16 ASSESSMENT — PAIN SCALES - GENERAL
PAINLEVEL_OUTOF10: 4
PAINLEVEL_OUTOF10: 0
PAINLEVEL_OUTOF10: 0

## 2025-05-16 NOTE — PROGRESS NOTES
Clinical Pharmacy Progress Note  Medication History     Admit Date: 5/13/2025    List of of current medications patient is taking is complete as possible. Home Medication list in EPIC updated to reflect changes noted below.    Source of information: pharmacy fills, OARRS  No family in room today; unable to reach daughter via phone (did  not leave message)  Also used [facility]    Patient's [facility]: [facility] [phone]     Changes made to medication list:   Medications removed:   Prednisone taper - from July 2024  Tramadol prn - none on OARRS report for > 1 yr  Vit D3 5000 units OTC - not on [facility] medication list  Sudafed OTC prn - not on [facility] medication list  Lidoderm 5% patch daily - not filled since   Medications added:   Albuterol MDI prn (pt fills both nebs and MDI)  Medication doses adjusted:   Zolpidem - changed to 10mg QHS prn per OARRS (last filled 4/24/25)  Other notes:   Rosuvastatin - concern that patient may not be taking.  Last filled at [facility] in 2024.  Currently on [facility] medication list, thus did not remove.      Current Outpatient Medications   Medication Instructions    acetaminophen (TYLENOL) 650 mg, Oral, EVERY 6 HOURS PRN    albuterol (PROVENTIL) 2.5 mg, EVERY 6 HOURS PRN    albuterol sulfate HFA (VENTOLIN HFA) 108 (90 Base) MCG/ACT inhaler 2 puffs, EVERY 6 HOURS PRN    Budeson-Glycopyrrol-Formoterol (BREZTRI AEROSPHERE) 160-9-4.8 MCG/ACT AERO 2 puffs, 2 times daily    guaiFENesin (MUCINEX) 600 mg, Oral, 2 TIMES DAILY PRN    losartan (COZAAR) 25 mg, DAILY    metFORMIN (GLUCOPHAGE-XR) 500 mg, DAILY WITH BREAKFAST    omeprazole (PRILOSEC) 40 mg, DAILY    rosuvastatin (CRESTOR) 20 mg, Oral, DAILY    zolpidem (AMBIEN) 10 mg, NIGHTLY PRN       Please call with any questions.  Kizzy Crawford PharmD., BCPS   5/16/2025 2:41 PM  Wireless: [phone]

## 2025-05-16 NOTE — PROGRESS NOTES
Occupational Therapy/Physical Therapy  Discharge    Pt transferred to ICU and intubated. Will sign off an request new orders if/when appropriate for therapy evaluation.    Priyanka Aj OTR/L #[id number]  Billie Carter, PT [phone]

## 2025-05-16 NOTE — CARE COORDINATION
12:08 PM  Patient is from home, with family.   Currently inb/sed; vent dependent,   Corpak started for TF.   Therapy s/o due intubation  Will need therapy prior to dc to assist with dispo planning.  CM following      Electronically signed by Bobby Biswas RN, CM on 5/16/2025 at 2:03 PM.  Phone: [phone]  Fax: [phone]

## 2025-05-16 NOTE — PLAN OF CARE
Problem: Chronic Conditions and Co-morbidities  Goal: Patient's chronic conditions and co-morbidity symptoms are monitored and maintained or improved  Outcome: Progressing  Flowsheets (Taken 5/16/2025 0800)  Care Plan - Patient's Chronic Conditions and Co-Morbidity Symptoms are Monitored and Maintained or Improved:   Monitor and assess patient's chronic conditions and comorbid symptoms for stability, deterioration, or improvement   Update acute care plan with appropriate goals if chronic or comorbid symptoms are exacerbated and prevent overall improvement and discharge   Collaborate with multidisciplinary team to address chronic and comorbid conditions and prevent exacerbation or deterioration     Problem: Discharge Planning  Goal: Discharge to home or other facility with appropriate resources  Outcome: Progressing  Flowsheets (Taken 5/16/2025 0800)  Discharge to home or other facility with appropriate resources:   Identify barriers to discharge with patient and caregiver   Identify discharge learning needs (meds, wound care, etc)   Refer to discharge planning if patient needs post-hospital services based on physician order or complex needs related to functional status, cognitive ability or social support system   Arrange for interpreters to assist at discharge as needed   Arrange for needed discharge resources and transportation as appropriate     Problem: Skin/Tissue Integrity  Goal: Skin integrity remains intact  Description: 1.  Monitor for areas of redness and/or skin breakdown2.  Assess vascular access sites hourly3.  Every 4-6 hours minimum:  Change oxygen saturation probe site4.  Every 4-6 hours:  If on nasal continuous positive airway pressure, respiratory therapy assess nares and determine need for appliance change or resting period  Outcome: Progressing  Flowsheets (Taken 5/16/2025 0800)  Skin Integrity Remains Intact:   Monitor for areas of redness and/or skin breakdown   Assess vascular access sites

## 2025-05-16 NOTE — PROGRESS NOTES
Nephrology Progress Note                                                                                                                                                                                                                                                                                                                                                               Office : [phone]     Fax :[phone]    Patient's Name: Susanne Jason  7:10 PM  5/16/2025    Reason for Consult:  Hyperkalemia   Requesting Physician:  No primary care provider on file.  Chief Complaint:    Chief Complaint   Patient presents with    Respiratory Distress     Pt been having difficulty breathing since this morning, on 3 lpm oxygen at baseline but spo2 is just 75%        Assessment/Plan     # Hyperkalemia   - S/p Lasix 20 mg x 1. S/p IVF  - On tube feeding (low K)  - Hold ARB  - Monitor     # Acute on chronic hypoxic respiratory failure  # COPD exacerbation  - Intubated on 5/15  - On abx and steroids   - Pulmonary on board    # HTN  - BP's controlled   - Monitor     # DM2  - Insulin management per primary       History of Present Ilness:    Susanne Jason is a 65 y.o. female with a PMH of COPD, chronic respiratory failure, HLD, HTN, and DM2, who presents with acute on chronic hypoxic respiratory failure secondary to COPD exacerbation. We are now being consulted for hyperkalemia.     Interval hx:    Rapid response yesterday secondary to AMS  Required intubation   K improved  Creatinine stable  Good UOP  BP stable      Past Medical History:   Diagnosis Date    Asthma     COPD (chronic obstructive pulmonary disease) (HCC)     Diabetes mellitus (HCC)     GERD (gastroesophageal reflux disease)        Past Surgical History:   Procedure Laterality Date    COLONOSCOPY      LAPAROSCOPY      OTHER SURGICAL HISTORY  1/9/2015    PLANTAR FASCIOTOMY RIGHT FOOT; STEROID INJECTION LEFT HEEL    OTHER SURGICAL HISTORY      patient states  surgery for  stomach ulcer    PLANTAR FASCIA SURGERY Left 3/30/16    ENDOSCOPIC PLANTAR FASCIOTOMY LEFT FOOT    WISDOM TOOTH EXTRACTION         History reviewed. No pertinent family history.     reports that she has been smoking. She has a 15 pack-year smoking history. She does not have any smokeless tobacco history on file. She reports that she does not drink alcohol and does not use drugs.    Allergies:  Latex    Current Medications:    sennosides-docusate sodium (SENOKOT-S) 8.6-50 MG tablet 2 tablet, Daily  polyethylene glycol (GLYCOLAX) packet 17 g, BID  methylPREDNISolone sodium succ (SOLU-MEDROL) 40 mg in sterile water 1 mL injection, Daily  albuterol (PROVENTIL) (2.5 MG/3ML) 0.083% nebulizer solution 2.5 mg, Q6H PRN  glucose chewable tablet 16 g, PRN  dextrose bolus 10% 125 mL, PRN   Or  dextrose bolus 10% 250 mL, PRN  dextrose 10 % infusion, Continuous PRN  propofol infusion, Continuous  fentaNYL (SUBLIMAZE) 1,000 mcg in sodium chloride 0.9% 100 mL infusion, Continuous  pantoprazole (PROTONIX) 40 mg in sodium chloride (PF) 0.9 % 10 mL injection, Daily  sodium chloride flush 0.9 % injection 5-40 mL, 2 times per day  sodium chloride flush 0.9 % injection 5-40 mL, PRN  0.9 % sodium chloride infusion, PRN  magnesium sulfate 2000 mg in 50 mL IVPB premix, PRN  enoxaparin (LOVENOX) injection 40 mg, Daily  ondansetron (ZOFRAN-ODT) disintegrating tablet 4 mg, Q8H PRN   Or  ondansetron (ZOFRAN) injection 4 mg, Q6H PRN  polyethylene glycol (GLYCOLAX) packet 17 g, Daily PRN  acetaminophen (TYLENOL) tablet 650 mg, Q6H PRN   Or  acetaminophen (TYLENOL) suppository 650 mg, Q6H PRN  budesonide (PULMICORT) nebulizer suspension 500 mcg, BID RT  glucose chewable tablet 16 g, PRN  dextrose bolus 10% 125 mL, PRN   Or  dextrose bolus 10% 250 mL, PRN  glucagon injection 1 mg, PRN  dextrose 10 % infusion, Continuous PRN  insulin lispro (HUMALOG,ADMELOG) injection vial 0-8 Units, 4x Daily AC & HS  ipratropium 0.5 mg-albuterol 2.5 mg

## 2025-05-16 NOTE — CONSULTS
Comprehensive Nutrition Assessment    RECOMMENDATIONS:  Initiate TF  Vital HP @ 40 mL/hr (960 mL TV) Appropriate for hyperkalemia  Flushes: 30 ml q4h  Nutrition Education: Education/Counseling not appropriate     NUTRITION ASSESSMENT:   Nutritional summary & status: Pt presented w/ worsening dyspnea requiring intubation. No pressor requirements, sedated on propofol providing 288 kcal/day. Abdomen distended, KUB shows significant stool burden. Also on fentanyl, starting bowel regimen today. Pt will likely remain intubated for prolonged period, starting TF today. No recent weight loss, no muscle mass or subcutaneous fat wasting noted. Peptide based high protein formula appropriate for current hyperkalemia (1,344 mg K).   Admission // PMH: COPD//acute respiratory failure, hyperkalemia, HTN, DM    MALNUTRITION ASSESSMENT  Context of Malnutrition: Acute Illness   Malnutrition Status: At risk for malnutrition  Findings of the 6 clinical characteristics of malnutrition (Minimum of 2 out of 6 clinical characteristics is required to make the diagnosis of moderate or severe Protein Calorie Malnutrition based on AND/ASPEN Guidelines):  Energy Intake:  No decrease in energy intake  Weight Loss:  No weight loss     Body Fat Loss:  No body fat loss     Muscle Mass Loss:  No muscle mass loss    Fluid Accumulation:  No fluid accumulation      NUTRITION DIAGNOSIS   Inadequate oral intake related to impaired respiratory function as evidenced by intubation    Nutrition Monitoring and Evaluation:   Food/Nutrient Intake Outcomes:  Enteral Nutrition Intake/Tolerance  Physical Signs/Symptoms Outcomes:  Biochemical Data, Nutrition Focused Physical Findings, Weight, Hemodynamic Status     OBJECTIVE DATA: Significant to nutrition assessment  Nutrition Related Findings: Na 135. K 5.3. .  Wounds: None  Nutrition Goals: Initiate nutrition support, within 2 days     CURRENT NUTRITION THERAPIES  Current Tube Feeding (TF) Orders:  Feeding

## 2025-05-16 NOTE — PROGRESS NOTES
Pt very agitated on vent. SBP 150s, HR 120s. Pt sitting straight up in bed. Sedation increased, pt continues to thrash around in bed.     Bladder scan showed 675 ml. Pt straight cathed and 725 ml of clear yellow urine returned. PVR 0 ml. Pt now resting with eyes closed in bed. CHG bath given, gowns and linens changed.

## 2025-05-16 NOTE — PROGRESS NOTES
Sedation decreased to fentanyl at 25 & propofol at 20. Pt followed commands. After KUB, pt became agitated, sitting up in bed, trying to pull at ETT. Pt noncompliant w/ ventilator, coughing and biting on ETT. -120s, RR 30s. Sedation increased for safety. Restraints assessed.

## 2025-05-16 NOTE — PROGRESS NOTES
V2.0    [facility] [place]      Name:  Susanne Jason /Age/Sex: 1960  (65 y.o. female)   MRN & CSN:  [id number] & [id number] Encounter Date/Time: 2025 8:17 AM EDT   Location:  [facility]/4516-01 PCP: No primary care provider on file.     Attending:Adirán Hernandez MD       Hospital Day: 4    HPI:    Assessment and Recommendations     Hospital course:    Susanne Jason is a 65 y.o. female with pmh of COPD, chronic respiratory failure, hyperlipidemia, hypertension, diabetes who presents with COPD (chronic obstructive pulmonary disease) (HCC) exacerbation with acute on chronic hypoxic respiratory failure with acute on chronic hypercarbic respiratory failure      Acute on chronic hypoxic respiratory failure with acute hypercarbic respiratory failure: Present on admission  - Patient hypoxic in the ED needing 15 L of oxygen to maintain sats greater than 97%.  On presentation patient had a pH of 7.2, with CO2 of 80 however she progressively got worse her pH dropped to 7.09 with CO2 greater than 98 and needed to be on BiPAP.   - On 3 L oxygen chronically, for asthma/COPD  - Placed on BiPAP on admission and then high flow nasal cannula; back on bipap which reportedly was kept on most of the night after periods of patient refusal to wear: Appears this has been noted in previous hospitalizations  - Required tx to ICU and subsequent intubation Hosp Day 1: 25  - ABG improved since intubation  - Continue management for COPD exacerbation  - Vent mgt: Per Pulmonary/CCT   - Palliative care consulted in view of her advanced COPD    Severe COPD/severe persistent asthma, current smoker   COPD exacerbation  - Chronically on 3 L oxygen  - Started on DuoNebs and steroids; azithromycin; management for asthma/COPD exacerbation  - She follows with Pulmonary Eun Donovan at [facility] for her severe COPD/asthma. - Per notes from 3/2025:   \"With uncontrolled Asthma/ recurrent exacerbations/ peripheral eosinophilia will  present  Gastrointestinal: Soft, non tender  Genitourinary: no suprapubic tenderness  Musculoskeletal: No edema  Skin: warm, dry  Neuro: Alert.  Psych: Mood appropriate.         Medications:   Medications:    sennosides-docusate sodium  2 tablet Orogastric Daily    polyethylene glycol  17 g Orogastric BID    methylPREDNISolone  40 mg IntraVENous Daily    azithromycin  500 mg IntraVENous Q24H    pantoprazole (PROTONIX) 40 mg in sodium chloride (PF) 0.9 % 10 mL injection  40 mg IntraVENous Daily    sodium chloride flush  5-40 mL IntraVENous 2 times per day    enoxaparin  40 mg SubCUTAneous Daily    budesonide  0.5 mg Nebulization BID RT    insulin lispro  0-8 Units SubCUTAneous 4x Daily AC & HS    ipratropium 0.5 mg-albuterol 2.5 mg  1 Dose Inhalation Q4H WA RT    lidocaine  1 patch TransDERmal Daily    [Held by provider] losartan  25 mg Oral Daily    rosuvastatin  20 mg Oral Daily    arformoterol tartrate  15 mcg Nebulization BID RT      Infusions:    dextrose      propofol 40 mcg/kg/min (05/16/25 1226)    fentaNYL 50 mcg/hr (05/16/25 1226)    sodium chloride      dextrose       PRN Meds: glucose, 4 tablet, PRN  dextrose bolus, 125 mL, PRN   Or  dextrose bolus, 250 mL, PRN  dextrose, , Continuous PRN  sodium chloride flush, 5-40 mL, PRN  sodium chloride, , PRN  magnesium sulfate, 2,000 mg, PRN  ondansetron, 4 mg, Q8H PRN   Or  ondansetron, 4 mg, Q6H PRN  polyethylene glycol, 17 g, Daily PRN  acetaminophen, 650 mg, Q6H PRN   Or  acetaminophen, 650 mg, Q6H PRN  glucose, 4 tablet, PRN  dextrose bolus, 125 mL, PRN   Or  dextrose bolus, 250 mL, PRN  glucagon (rDNA), 1 mg, PRN  dextrose, , Continuous PRN  hydrALAZINE, 10 mg, Q8H PRN  hydrOXYzine HCl, 10 mg, TID PRN        Labs and Imaging   CTA PULMONARY W CONTRAST  Result Date: 5/14/2025  EXAM:CTA PULMONARY W CONTRAST INDICATION: PE COMPARISON: Chest radiograph dated 5/13/2025 and other prior studies TECHNIQUE: Helically acquired axial images were obtained through the

## 2025-05-16 NOTE — PROGRESS NOTES
ICU CONSULT       Hospital Day: 3  ICU Day: 2                                                         Code:Full Code  Admit Date: 5/13/2025  PCP: No primary care provider on file.                                  CC: Dyspnea    SUBJECTIVE:   Interval Events:  - No acute events overnight  - Vital signs stable. MAPs 70s off pressors. Vent settings unchanged  - ABG improved since intubation, 7.37/61.3/87.6  - This AM, labs significant for K+ 5.3 (hemolyzed)  - Bronchoscopy performed yesterday, with significant tracheomalacia, coughing with 90% narrowing of trachea  - Very prolonged expiratory phase appreciated on ventilator    Interval Plan:  - Will attempt to wean ventilatory support today, continue steroids and inhalers  - Nephrology following for hyperkalemia  - Consider tube feeds    Vent Settings: Vent Mode: AC/PRVC Resp Rate (Set): 16 bpm/Vt (Set, mL): 400 mL/ /FiO2 : 45 % / PEEP/CPAP (cmH2O): 10 / SpO2: 95 %    HPI:  Patient is a 65 year old female with a history of COPD on 3L O2 baseline, asthma, diabetes, hypertension, HLD who presented for worsening dyspnea. ICU consulted for closer monitoring of hypercarbic respiratory failure.    Patient initially presented to the ED on 5/13 with 2-3 days of worsening shortness of breath with cough. Her cough also was associated with yellow sputum. Had also been reporting fevers at home. Her SpO2 was in mid 70s at the time. She was started on continuous nebulizer tx in ED, given steroids and magnesium. Initial labs with pH 7.2 with pCO2 of 87.5. CXR with no evidence of pna. Troponin and BNP unremarkable. She was admitted to the PCU and started on BiPAP with duonebs, solumedrol 40 mg IV BID. Pulmonology following.    Since initiation of BiPAP, her respiratory symptoms have not significantly improved. Her pH and pCO2 initially began to improve, best was 7.27 / 74.3. However, on 5/15, began to once again worsen, pH 7.228.    Rapid response was called at approx 1330 on

## 2025-05-17 LAB
ALBUMIN SERPL-MCNC: 3.7 G/DL (ref 3.4–5)
ANION GAP SERPL CALCULATED.3IONS-SCNC: 9 MMOL/L (ref 3–16)
BASOPHILS # BLD: 0 K/UL (ref 0–0.2)
BASOPHILS NFR BLD: 0 %
BUN SERPL-MCNC: 33 MG/DL (ref 7–20)
CALCIUM SERPL-MCNC: 9.1 MG/DL (ref 8.3–10.6)
CHLORIDE SERPL-SCNC: 98 MMOL/L (ref 99–110)
CO2 SERPL-SCNC: 29 MMOL/L (ref 21–32)
CREAT SERPL-MCNC: 0.7 MG/DL (ref 0.6–1.2)
DEPRECATED RDW RBC AUTO: 18.2 % (ref 12.4–15.4)
EOSINOPHIL # BLD: 0 K/UL (ref 0–0.6)
EOSINOPHIL NFR BLD: 0 %
GFR SERPLBLD CREATININE-BSD FMLA CKD-EPI: >90 ML/MIN/{1.73_M2}
GLUCOSE BLD-MCNC: 163 MG/DL (ref 70–99)
GLUCOSE BLD-MCNC: 171 MG/DL (ref 70–99)
GLUCOSE BLD-MCNC: 204 MG/DL (ref 70–99)
GLUCOSE SERPL-MCNC: 139 MG/DL (ref 70–99)
HCT VFR BLD AUTO: 36.6 % (ref 36–48)
HGB BLD-MCNC: 11.6 G/DL (ref 12–16)
LYMPHOCYTES # BLD: 3.6 K/UL (ref 1–5.1)
LYMPHOCYTES NFR BLD: 28 %
MAGNESIUM SERPL-MCNC: 2.34 MG/DL (ref 1.8–2.4)
MCH RBC QN AUTO: 23.4 PG (ref 26–34)
MCHC RBC AUTO-ENTMCNC: 31.5 G/DL (ref 31–36)
MCV RBC AUTO: 74.3 FL (ref 80–100)
METAMYELOCYTES NFR BLD MANUAL: 2 %
MONOCYTES # BLD: 1.1 K/UL (ref 0–1.3)
MONOCYTES NFR BLD: 9 %
MYELOCYTES NFR BLD MANUAL: 1 %
NEUTROPHILS # BLD: 8 K/UL (ref 1.7–7.7)
NEUTROPHILS NFR BLD: 60 %
NRBC BLD-RTO: 1 /100 WBC
PERFORMED ON: ABNORMAL
PHOSPHATE SERPL-MCNC: 4.5 MG/DL (ref 2.5–4.9)
PLATELET # BLD AUTO: 194 K/UL (ref 135–450)
PMV BLD AUTO: 8.2 FL (ref 5–10.5)
POTASSIUM SERPL-SCNC: 4.9 MMOL/L (ref 3.5–5.1)
POTASSIUM SERPL-SCNC: ABNORMAL MMOL/L (ref 3.5–5.1)
RBC # BLD AUTO: 4.93 M/UL (ref 4–5.2)
SODIUM SERPL-SCNC: 136 MMOL/L (ref 136–145)
WBC # BLD AUTO: 12.7 K/UL (ref 4–11)

## 2025-05-17 PROCEDURE — 6370000000 HC RX 637 (ALT 250 FOR IP): Performed by: INTERNAL MEDICINE

## 2025-05-17 PROCEDURE — 6360000002 HC RX W HCPCS

## 2025-05-17 PROCEDURE — 2580000003 HC RX 258

## 2025-05-17 PROCEDURE — 36415 COLL VENOUS BLD VENIPUNCTURE: CPT

## 2025-05-17 PROCEDURE — 6370000000 HC RX 637 (ALT 250 FOR IP)

## 2025-05-17 PROCEDURE — 94640 AIRWAY INHALATION TREATMENT: CPT

## 2025-05-17 PROCEDURE — 84478 ASSAY OF TRIGLYCERIDES: CPT

## 2025-05-17 PROCEDURE — 94761 N-INVAS EAR/PLS OXIMETRY MLT: CPT

## 2025-05-17 PROCEDURE — 83735 ASSAY OF MAGNESIUM: CPT

## 2025-05-17 PROCEDURE — 99233 SBSQ HOSP IP/OBS HIGH 50: CPT | Performed by: HOSPITALIST

## 2025-05-17 PROCEDURE — 6360000002 HC RX W HCPCS: Performed by: INTERNAL MEDICINE

## 2025-05-17 PROCEDURE — 2500000003 HC RX 250 WO HCPCS

## 2025-05-17 PROCEDURE — 2500000003 HC RX 250 WO HCPCS: Performed by: INTERNAL MEDICINE

## 2025-05-17 PROCEDURE — 2700000000 HC OXYGEN THERAPY PER DAY

## 2025-05-17 PROCEDURE — 51798 US URINE CAPACITY MEASURE: CPT

## 2025-05-17 PROCEDURE — 51701 INSERT BLADDER CATHETER: CPT

## 2025-05-17 PROCEDURE — 2000000000 HC ICU R&B

## 2025-05-17 PROCEDURE — 84132 ASSAY OF SERUM POTASSIUM: CPT

## 2025-05-17 PROCEDURE — 94003 VENT MGMT INPAT SUBQ DAY: CPT

## 2025-05-17 PROCEDURE — 99291 CRITICAL CARE FIRST HOUR: CPT | Performed by: INTERNAL MEDICINE

## 2025-05-17 PROCEDURE — 80069 RENAL FUNCTION PANEL: CPT

## 2025-05-17 PROCEDURE — 85025 COMPLETE CBC W/AUTO DIFF WBC: CPT

## 2025-05-17 RX ORDER — PREDNISONE 20 MG/1
40 TABLET ORAL DAILY
Status: DISCONTINUED | OUTPATIENT
Start: 2025-05-17 | End: 2025-05-19

## 2025-05-17 RX ORDER — DEXMEDETOMIDINE HYDROCHLORIDE 4 UG/ML
.1-1.5 INJECTION, SOLUTION INTRAVENOUS CONTINUOUS
Status: DISCONTINUED | OUTPATIENT
Start: 2025-05-17 | End: 2025-05-19

## 2025-05-17 RX ADMIN — DEXMEDETOMIDINE 0.2 MCG/KG/HR: 100 INJECTION, SOLUTION INTRAVENOUS at 13:04

## 2025-05-17 RX ADMIN — DOCUSATE SODIUM 50 MG AND SENNOSIDES 8.6 MG 2 TABLET: 8.6; 5 TABLET, FILM COATED ORAL at 10:31

## 2025-05-17 RX ADMIN — ENOXAPARIN SODIUM 40 MG: 100 INJECTION SUBCUTANEOUS at 10:31

## 2025-05-17 RX ADMIN — DEXMEDETOMIDINE 0.6 MCG/KG/HR: 100 INJECTION, SOLUTION INTRAVENOUS at 22:45

## 2025-05-17 RX ADMIN — ARFORMOTEROL TARTRATE 15 MCG: 15 SOLUTION RESPIRATORY (INHALATION) at 11:10

## 2025-05-17 RX ADMIN — PROPOFOL 30 MCG/KG/MIN: 10 INJECTION, EMULSION INTRAVENOUS at 10:30

## 2025-05-17 RX ADMIN — SODIUM CHLORIDE, PRESERVATIVE FREE 40 MG: 5 INJECTION INTRAVENOUS at 10:32

## 2025-05-17 RX ADMIN — PREDNISONE 40 MG: 20 TABLET ORAL at 10:31

## 2025-05-17 RX ADMIN — POLYETHYLENE GLYCOL 3350 17 G: 17 POWDER, FOR SOLUTION ORAL at 10:31

## 2025-05-17 RX ADMIN — ALBUTEROL SULFATE 2.5 MG: 2.5 SOLUTION RESPIRATORY (INHALATION) at 05:53

## 2025-05-17 RX ADMIN — SODIUM CHLORIDE, PRESERVATIVE FREE 10 ML: 5 INJECTION INTRAVENOUS at 10:32

## 2025-05-17 RX ADMIN — IPRATROPIUM BROMIDE AND ALBUTEROL SULFATE 1 DOSE: 2.5; .5 SOLUTION RESPIRATORY (INHALATION) at 15:57

## 2025-05-17 RX ADMIN — BUDESONIDE 500 MCG: 0.5 SUSPENSION RESPIRATORY (INHALATION) at 11:10

## 2025-05-17 RX ADMIN — ARFORMOTEROL TARTRATE 15 MCG: 15 SOLUTION RESPIRATORY (INHALATION) at 20:52

## 2025-05-17 RX ADMIN — PROPOFOL 30 MCG/KG/MIN: 10 INJECTION, EMULSION INTRAVENOUS at 04:26

## 2025-05-17 RX ADMIN — IPRATROPIUM BROMIDE AND ALBUTEROL SULFATE 1 DOSE: 2.5; .5 SOLUTION RESPIRATORY (INHALATION) at 20:52

## 2025-05-17 RX ADMIN — SODIUM CHLORIDE, PRESERVATIVE FREE 10 ML: 5 INJECTION INTRAVENOUS at 21:23

## 2025-05-17 RX ADMIN — IPRATROPIUM BROMIDE AND ALBUTEROL SULFATE 1 DOSE: 2.5; .5 SOLUTION RESPIRATORY (INHALATION) at 11:10

## 2025-05-17 RX ADMIN — POLYETHYLENE GLYCOL 3350 17 G: 17 POWDER, FOR SOLUTION ORAL at 21:22

## 2025-05-17 RX ADMIN — IPRATROPIUM BROMIDE AND ALBUTEROL SULFATE 1 DOSE: 2.5; .5 SOLUTION RESPIRATORY (INHALATION) at 07:55

## 2025-05-17 RX ADMIN — INSULIN LISPRO 2 UNITS: 100 INJECTION, SOLUTION INTRAVENOUS; SUBCUTANEOUS at 18:51

## 2025-05-17 RX ADMIN — Medication 75 MCG/HR: at 15:12

## 2025-05-17 RX ADMIN — ROSUVASTATIN CALCIUM 20 MG: 20 TABLET, FILM COATED ORAL at 10:31

## 2025-05-17 RX ADMIN — BUDESONIDE 500 MCG: 0.5 SUSPENSION RESPIRATORY (INHALATION) at 20:52

## 2025-05-17 ASSESSMENT — PULMONARY FUNCTION TESTS
PIF_VALUE: 18
PIF_VALUE: 27
PIF_VALUE: 19
PIF_VALUE: 35
PIF_VALUE: 19
PIF_VALUE: 29
PIF_VALUE: 29
PIF_VALUE: 30
PIF_VALUE: 18
PIF_VALUE: 29
PIF_VALUE: 30
PIF_VALUE: 29
PIF_VALUE: 26
PIF_VALUE: 20
PIF_VALUE: 39
PIF_VALUE: 34
PIF_VALUE: 31
PIF_VALUE: 19
PIF_VALUE: 19
PIF_VALUE: 18
PIF_VALUE: 19
PIF_VALUE: 32
PIF_VALUE: 30

## 2025-05-17 ASSESSMENT — PAIN SCALES - GENERAL
PAINLEVEL_OUTOF10: 0

## 2025-05-17 NOTE — PROGRESS NOTES
Bladder scan at this time 481 ml, Straight catheterization performed per order. Straight catheterized using sterile technique

## 2025-05-17 NOTE — PLAN OF CARE
skin breakdown2.  Assess vascular access sites hourly3.  Every 4-6 hours minimum:  Change oxygen saturation probe site4.  Every 4-6 hours:  If on nasal continuous positive airway pressure, respiratory therapy assess nares and determine need for appliance change or resting period  5/17/2025 0219 by Britton Noel, RN  Outcome: Progressing     Problem: ABCDS Injury Assessment  Goal: Absence of physical injury  5/17/2025 0219 by Britton Noel, RN  Outcome: Progressing

## 2025-05-17 NOTE — PROGRESS NOTES
V2.0    [facility] [place]      Name:  Susanne Jason /Age/Sex: 1960  (65 y.o. female)   MRN & CSN:  [id number] & [id number] Encounter Date/Time: 2025 8:17 AM EDT   Location:  [facility]/4516-01 PCP: No primary care provider on file.     Attending:Adrián Hernandez MD       Hospital Day: 5    HPI:    Assessment and Recommendations     Hospital course:    Susanne Jason is a 65 y.o. female with pmh of COPD, chronic respiratory failure, hyperlipidemia, hypertension, diabetes who presents with COPD (chronic obstructive pulmonary disease) (HCC) exacerbation with acute on chronic hypoxic respiratory failure with acute on chronic hypercarbic respiratory failure      Acute on chronic hypoxic respiratory failure with acute hypercarbic respiratory failure: Present on admission  - Patient hypoxic in the ED needing 15 L of oxygen to maintain sats greater than 97%.  On presentation patient had a pH of 7.2, with CO2 of 80 however she progressively got worse her pH dropped to 7.09 with CO2 greater than 98 and needed to be on BiPAP.   - On 3 L oxygen chronically, for asthma/COPD  - Placed on BiPAP on admission and then high flow nasal cannula; back on bipap which reportedly was kept on most of the night after periods of patient refusal to wear: Appears this has been noted in previous hospitalizations  - Required tx to ICU and subsequent intubation Hosp Day 1: 25  - Continue management for COPD exacerbation  - Vent mgt: Per Pulmonary/CCT   - Palliative care was consulted in view of her advanced COPD    Severe COPD/severe persistent asthma, current smoker   COPD exacerbation  - Chronically on 3 L oxygen  - Started on DuoNebs and steroids; azithromycin; management for asthma/COPD exacerbation  - She follows with Pulmonary Eun Donovan at [facility] for her severe COPD/asthma. - Per notes from 3/2025:   \"With uncontrolled Asthma/ recurrent exacerbations/ peripheral eosinophilia will initiate biological agents-  09:32 PM     Urine Cultures: No results found for: \"LABURIN\"  Blood Cultures:   Lab Results   Component Value Date/Time    BC No Growth after 4 days of incubation. 11/13/2023 01:05 AM     Lab Results   Component Value Date/Time    BLOODCULT2 No Growth after 4 days of incubation. 11/13/2023 01:05 AM     Organism:   Lab Results   Component Value Date/Time    ORG Bacteroides ovatus/xylanisolvens 08/24/2024 06:15 PM    ORG Enterobacter cloacae complex 08/24/2024 06:15 PM    ORG Streptococcus anginosus 08/24/2024 06:15 PM    ORG Pseudomonas aeruginosa 08/24/2024 06:15 PM         Electronically signed by Adrián Hernandez MD on 5/17/2025 at 3:05 PM  Comment: Please note this report has been produced using speech recognition software and may contain errors related to that system including errors in grammar, punctuation, and spelling, as well as words and phrases that may be inappropriate. If there are any questions or concerns, please feel free to contact the dictating provider for clarification.

## 2025-05-17 NOTE — PLAN OF CARE
Problem: Chronic Conditions and Co-morbidities  Goal: Patient's chronic conditions and co-morbidity symptoms are monitored and maintained or improved  Outcome: Progressing  Flowsheets (Taken 5/17/2025 0800)  Care Plan - Patient's Chronic Conditions and Co-Morbidity Symptoms are Monitored and Maintained or Improved:   Monitor and assess patient's chronic conditions and comorbid symptoms for stability, deterioration, or improvement   Collaborate with multidisciplinary team to address chronic and comorbid conditions and prevent exacerbation or deterioration   Update acute care plan with appropriate goals if chronic or comorbid symptoms are exacerbated and prevent overall improvement and discharge     Problem: Discharge Planning  Goal: Discharge to home or other facility with appropriate resources  Outcome: Progressing  Flowsheets (Taken 5/17/2025 0800)  Discharge to home or other facility with appropriate resources: Identify barriers to discharge with patient and caregiver     Problem: Safety - Adult  Goal: Free from fall injury  Outcome: Progressing     Problem: Pain  Goal: Verbalizes/displays adequate comfort level or baseline comfort level  Outcome: Progressing  Flowsheets  Taken 5/17/2025 1600  Verbalizes/displays adequate comfort level or baseline comfort level:   Encourage patient to monitor pain and request assistance   Assess pain using appropriate pain scale   Administer analgesics based on type and severity of pain and evaluate response   Implement non-pharmacological measures as appropriate and evaluate response  Taken 5/17/2025 1200  Verbalizes/displays adequate comfort level or baseline comfort level:   Encourage patient to monitor pain and request assistance   Assess pain using appropriate pain scale   Administer analgesics based on type and severity of pain and evaluate response   Implement non-pharmacological measures as appropriate and evaluate response  Taken 5/17/2025 0800  Verbalizes/displays

## 2025-05-17 NOTE — PROGRESS NOTES
Nephrology Progress Note                                                                                                                                                                                                                                                                                                                                                               Office : [phone]     Fax :[phone]    Patient's Name: Susanne Jason  9:55 AM  5/17/2025    Reason for Consult:  Hyperkalemia   Requesting Physician:  No primary care provider on file.  Chief Complaint:    Chief Complaint   Patient presents with    Respiratory Distress     Pt been having difficulty breathing since this morning, on 3 lpm oxygen at baseline but spo2 is just 75%        Assessment/Plan     # Hyperkalemia   - Better  - S/p Lasix 20 mg x 1. S/p IVF  - On tube feeding (low K)  - Hold ARB  - Monitor     # Acute on chronic hypoxic respiratory failure  # COPD exacerbation  - Intubated on 5/15  - On abx and steroids   - Pulmonary on board    # HTN  - BP's controlled   - Monitor     # DM2  - Insulin management per primary       History of Present Ilness:    Susanne Jason is a 65 y.o. female with a PMH of COPD, chronic respiratory failure, HLD, HTN, and DM2, who presents with acute on chronic hypoxic respiratory failure secondary to COPD exacerbation. We are now being consulted for hyperkalemia.     Interval hx:    Intubated   K improved  Required straight cath  Creatinine stable  Good UOP  BP stable      Past Medical History:   Diagnosis Date    Asthma     COPD (chronic obstructive pulmonary disease) (HCC)     Diabetes mellitus (HCC)     GERD (gastroesophageal reflux disease)        Past Surgical History:   Procedure Laterality Date    COLONOSCOPY      LAPAROSCOPY      OTHER SURGICAL HISTORY  1/9/2015    PLANTAR FASCIOTOMY RIGHT FOOT; STEROID INJECTION LEFT HEEL    OTHER SURGICAL HISTORY      patient states surgery for

## 2025-05-17 NOTE — PROGRESS NOTES
ICU CONSULT       Hospital Day: 4  ICU Day: 3                                                        Code:Full Code  Admit Date: 5/13/2025  PCP: No primary care provider on file.                                  CC: Dyspnea    SUBJECTIVE:   Interval Events:  - No acute events overnight. Vital signs stable, off pressors  - Remains intubated and sedated on propofol and fentanyl  - On exam, still having significant expiratory wheezes with prolonged expiratory phase. Wheezing worse on right side. No tracheal secretions this morning. Peak pressures remain stable in mid 30s    Vent Settings: Vent Mode: AC/PRVC Resp Rate (Set): 16 bpm/Vt (Set, mL): 400 mL/ /FiO2 : 50 % / PEEP/CPAP (cmH2O): 10 / SpO2: 96 %      Interval Plan:  - Continue steroids. Transition to oral prednisone. Continue inhalers  - Wean ventilatory support as able  - Nephrology following for hyperkalemia  - Tube feeds    Vent Settings: Vent Mode: AC/PRVC Resp Rate (Set): 16 bpm/Vt (Set, mL): 400 mL/ /FiO2 : 50 % / PEEP/CPAP (cmH2O): 10 / SpO2: 96 %    HPI:  Patient is a 65 year old female with a history of COPD on 3L O2 baseline, asthma, diabetes, hypertension, HLD who presented for worsening dyspnea. ICU consulted for closer monitoring of hypercarbic respiratory failure.    Patient initially presented to the ED on 5/13 with 2-3 days of worsening shortness of breath with cough. Her cough also was associated with yellow sputum. Had also been reporting fevers at home. Her SpO2 was in mid 70s at the time. She was started on continuous nebulizer tx in ED, given steroids and magnesium. Initial labs with pH 7.2 with pCO2 of 87.5. CXR with no evidence of pna. Troponin and BNP unremarkable. She was admitted to the PCU and started on BiPAP with duonebs, solumedrol 40 mg IV BID. Pulmonology following.    Since initiation of BiPAP, her respiratory symptoms have not significantly improved. Her pH and pCO2 initially began to improve, best was 7.27 / 74.3. However,  noted    Peripheral IV 05/14/25 Left;Proximal;Anterior Forearm (Active)   Number of days: 2       Peripheral IV 05/15/25 Right Antecubital (Active)   Number of days: 1     Acute respiratory failure on mechanical vent support.  , RR 16, FiO2 50, PEEP 10.   COPD exacerbation.  Significant wheezing on exam.  No airspace disease on CT.  Overall no significant change in dynamic compliance over the past 24 hours.    Acute on chronic respiratory acidosis.  ABG 7.35/64/99  Creatinine 0.7  Mild leukocytosis.  She is on steroids.    Hemodynamically stable.    On TF     Switch propofol to precedex  Plan for SBT.  No plan for extubation today   Continue pulmicort/brovana/duoneb  Continue azithromycin ans steroids        Pt has a high probability of imminent or life-threatening deterioration requiring close monitoring, and highly complex decision-making and/or interventions of high intensity to assess, manipulate, and support his critical organ systems to prevent a likely inevitable decline which could occur if left untreated.      A total critical care time 35 minutes was used. This includes but not limited to examining patient, collaborating with other physicians, monitoring vital signs, telemetry, continuous pulse oximetry, and clinical response to IV medications, documentation time, review and interpretation of laboratory and radiological data, review of nursing notes and old record review. This time excludes any time that may have been spent performing procedures for life threatening organ failure

## 2025-05-18 LAB
ALBUMIN SERPL-MCNC: 3.5 G/DL (ref 3.4–5)
ANION GAP SERPL CALCULATED.3IONS-SCNC: 8 MMOL/L (ref 3–16)
BASE EXCESS BLDA CALC-SCNC: 16 MMOL/L (ref -3–3)
BASOPHILS # BLD: 0 K/UL (ref 0–0.2)
BASOPHILS NFR BLD: 0.1 %
BUN SERPL-MCNC: 28 MG/DL (ref 7–20)
CALCIUM SERPL-MCNC: 9.2 MG/DL (ref 8.3–10.6)
CHLORIDE SERPL-SCNC: 97 MMOL/L (ref 99–110)
CO2 BLDA-SCNC: 42 MMOL/L
CO2 SERPL-SCNC: 33 MMOL/L (ref 21–32)
CREAT SERPL-MCNC: 0.6 MG/DL (ref 0.6–1.2)
DEPRECATED RDW RBC AUTO: 18.2 % (ref 12.4–15.4)
EOSINOPHIL # BLD: 0 K/UL (ref 0–0.6)
EOSINOPHIL NFR BLD: 0 %
GFR SERPLBLD CREATININE-BSD FMLA CKD-EPI: >90 ML/MIN/{1.73_M2}
GLUCOSE BLD-MCNC: 224 MG/DL (ref 70–99)
GLUCOSE BLD-MCNC: 225 MG/DL (ref 70–99)
GLUCOSE BLD-MCNC: 237 MG/DL (ref 70–99)
GLUCOSE BLD-MCNC: 264 MG/DL (ref 70–99)
GLUCOSE SERPL-MCNC: 211 MG/DL (ref 70–99)
HCO3 BLDA-SCNC: 40.3 MMOL/L (ref 21–29)
HCT VFR BLD AUTO: 33.2 % (ref 36–48)
HGB BLD-MCNC: 10.7 G/DL (ref 12–16)
LYMPHOCYTES # BLD: 2.5 K/UL (ref 1–5.1)
LYMPHOCYTES NFR BLD: 25.8 %
MAGNESIUM SERPL-MCNC: 1.88 MG/DL (ref 1.8–2.4)
MCH RBC QN AUTO: 23.6 PG (ref 26–34)
MCHC RBC AUTO-ENTMCNC: 32.3 G/DL (ref 31–36)
MCV RBC AUTO: 73 FL (ref 80–100)
MONOCYTES # BLD: 1.2 K/UL (ref 0–1.3)
MONOCYTES NFR BLD: 12.4 %
NEUTROPHILS # BLD: 6 K/UL (ref 1.7–7.7)
NEUTROPHILS NFR BLD: 61.7 %
PCO2 BLDA: 61.7 MM HG (ref 35–45)
PERFORMED ON: ABNORMAL
PH BLDA: 7.42 [PH] (ref 7.35–7.45)
PHOSPHATE SERPL-MCNC: 2.7 MG/DL (ref 2.5–4.9)
PLATELET # BLD AUTO: 207 K/UL (ref 135–450)
PMV BLD AUTO: 7.7 FL (ref 5–10.5)
PO2 BLDA: 75.1 MM HG (ref 75–108)
POC SAMPLE TYPE: ABNORMAL
POTASSIUM SERPL-SCNC: 4.6 MMOL/L (ref 3.5–5.1)
RBC # BLD AUTO: 4.54 M/UL (ref 4–5.2)
SAO2 % BLDA: 95 % (ref 93–100)
SODIUM SERPL-SCNC: 138 MMOL/L (ref 136–145)
WBC # BLD AUTO: 9.7 K/UL (ref 4–11)

## 2025-05-18 PROCEDURE — 94640 AIRWAY INHALATION TREATMENT: CPT

## 2025-05-18 PROCEDURE — 6360000002 HC RX W HCPCS: Performed by: INTERNAL MEDICINE

## 2025-05-18 PROCEDURE — 83735 ASSAY OF MAGNESIUM: CPT

## 2025-05-18 PROCEDURE — 6360000002 HC RX W HCPCS

## 2025-05-18 PROCEDURE — 2500000003 HC RX 250 WO HCPCS

## 2025-05-18 PROCEDURE — 2580000003 HC RX 258

## 2025-05-18 PROCEDURE — 94003 VENT MGMT INPAT SUBQ DAY: CPT

## 2025-05-18 PROCEDURE — 99291 CRITICAL CARE FIRST HOUR: CPT | Performed by: INTERNAL MEDICINE

## 2025-05-18 PROCEDURE — 6370000000 HC RX 637 (ALT 250 FOR IP)

## 2025-05-18 PROCEDURE — 82803 BLOOD GASES ANY COMBINATION: CPT

## 2025-05-18 PROCEDURE — 2700000000 HC OXYGEN THERAPY PER DAY

## 2025-05-18 PROCEDURE — 6370000000 HC RX 637 (ALT 250 FOR IP): Performed by: INTERNAL MEDICINE

## 2025-05-18 PROCEDURE — 2000000000 HC ICU R&B

## 2025-05-18 PROCEDURE — 85025 COMPLETE CBC W/AUTO DIFF WBC: CPT

## 2025-05-18 PROCEDURE — 51701 INSERT BLADDER CATHETER: CPT

## 2025-05-18 PROCEDURE — 2500000003 HC RX 250 WO HCPCS: Performed by: INTERNAL MEDICINE

## 2025-05-18 PROCEDURE — 51798 US URINE CAPACITY MEASURE: CPT

## 2025-05-18 PROCEDURE — 99233 SBSQ HOSP IP/OBS HIGH 50: CPT | Performed by: HOSPITALIST

## 2025-05-18 PROCEDURE — 36415 COLL VENOUS BLD VENIPUNCTURE: CPT

## 2025-05-18 PROCEDURE — 80069 RENAL FUNCTION PANEL: CPT

## 2025-05-18 PROCEDURE — 94761 N-INVAS EAR/PLS OXIMETRY MLT: CPT

## 2025-05-18 RX ADMIN — INSULIN LISPRO 4 UNITS: 100 INJECTION, SOLUTION INTRAVENOUS; SUBCUTANEOUS at 17:27

## 2025-05-18 RX ADMIN — SODIUM CHLORIDE, PRESERVATIVE FREE 10 ML: 5 INJECTION INTRAVENOUS at 21:24

## 2025-05-18 RX ADMIN — IPRATROPIUM BROMIDE AND ALBUTEROL SULFATE 1 DOSE: 2.5; .5 SOLUTION RESPIRATORY (INHALATION) at 08:43

## 2025-05-18 RX ADMIN — Medication 75 MCG/HR: at 05:51

## 2025-05-18 RX ADMIN — INSULIN LISPRO 2 UNITS: 100 INJECTION, SOLUTION INTRAVENOUS; SUBCUTANEOUS at 21:23

## 2025-05-18 RX ADMIN — DEXMEDETOMIDINE 0.6 MCG/KG/HR: 100 INJECTION, SOLUTION INTRAVENOUS at 20:38

## 2025-05-18 RX ADMIN — IPRATROPIUM BROMIDE AND ALBUTEROL SULFATE 1 DOSE: 2.5; .5 SOLUTION RESPIRATORY (INHALATION) at 20:18

## 2025-05-18 RX ADMIN — BUDESONIDE 500 MCG: 0.5 SUSPENSION RESPIRATORY (INHALATION) at 20:18

## 2025-05-18 RX ADMIN — ENOXAPARIN SODIUM 40 MG: 100 INJECTION SUBCUTANEOUS at 07:32

## 2025-05-18 RX ADMIN — DEXMEDETOMIDINE 0.6 MCG/KG/HR: 100 INJECTION, SOLUTION INTRAVENOUS at 09:03

## 2025-05-18 RX ADMIN — INSULIN LISPRO 2 UNITS: 100 INJECTION, SOLUTION INTRAVENOUS; SUBCUTANEOUS at 07:32

## 2025-05-18 RX ADMIN — DOCUSATE SODIUM 50 MG AND SENNOSIDES 8.6 MG 2 TABLET: 8.6; 5 TABLET, FILM COATED ORAL at 07:32

## 2025-05-18 RX ADMIN — INSULIN LISPRO 2 UNITS: 100 INJECTION, SOLUTION INTRAVENOUS; SUBCUTANEOUS at 10:40

## 2025-05-18 RX ADMIN — ROSUVASTATIN CALCIUM 20 MG: 20 TABLET, FILM COATED ORAL at 07:32

## 2025-05-18 RX ADMIN — SODIUM CHLORIDE, PRESERVATIVE FREE 10 ML: 5 INJECTION INTRAVENOUS at 07:34

## 2025-05-18 RX ADMIN — PREDNISONE 40 MG: 20 TABLET ORAL at 07:32

## 2025-05-18 RX ADMIN — ARFORMOTEROL TARTRATE 15 MCG: 15 SOLUTION RESPIRATORY (INHALATION) at 08:43

## 2025-05-18 RX ADMIN — POLYETHYLENE GLYCOL 3350 17 G: 17 POWDER, FOR SOLUTION ORAL at 21:23

## 2025-05-18 RX ADMIN — SODIUM CHLORIDE, PRESERVATIVE FREE 40 MG: 5 INJECTION INTRAVENOUS at 07:34

## 2025-05-18 RX ADMIN — BUDESONIDE 500 MCG: 0.5 SUSPENSION RESPIRATORY (INHALATION) at 08:43

## 2025-05-18 RX ADMIN — IPRATROPIUM BROMIDE AND ALBUTEROL SULFATE 1 DOSE: 2.5; .5 SOLUTION RESPIRATORY (INHALATION) at 12:22

## 2025-05-18 RX ADMIN — POLYETHYLENE GLYCOL 3350 17 G: 17 POWDER, FOR SOLUTION ORAL at 07:33

## 2025-05-18 RX ADMIN — HYDRALAZINE HYDROCHLORIDE 10 MG: 20 INJECTION INTRAMUSCULAR; INTRAVENOUS at 14:47

## 2025-05-18 RX ADMIN — ARFORMOTEROL TARTRATE 15 MCG: 15 SOLUTION RESPIRATORY (INHALATION) at 20:18

## 2025-05-18 ASSESSMENT — PULMONARY FUNCTION TESTS
PIF_VALUE: 19
PIF_VALUE: 18
PIF_VALUE: 18
PIF_VALUE: 19
PIF_VALUE: 18
PIF_VALUE: 18
PIF_VALUE: 19
PIF_VALUE: 18
PIF_VALUE: 19
PIF_VALUE: 19
PIF_VALUE: 18
PIF_VALUE: 19
PIF_VALUE: 18
PIF_VALUE: 19
PIF_VALUE: 18
PIF_VALUE: 19
PIF_VALUE: 19
PIF_VALUE: 18
PIF_VALUE: 19
PIF_VALUE: 18
PIF_VALUE: 18
PIF_VALUE: 19
PIF_VALUE: 19
PIF_VALUE: 18
PIF_VALUE: 19
PIF_VALUE: 18
PIF_VALUE: 18
PIF_VALUE: 19
PIF_VALUE: 19
PIF_VALUE: 18
PIF_VALUE: 18
PIF_VALUE: 19
PIF_VALUE: 18
PIF_VALUE: 19
PIF_VALUE: 18
PIF_VALUE: 19
PIF_VALUE: 18
PIF_VALUE: 19
PIF_VALUE: 18
PIF_VALUE: 19
PIF_VALUE: 19
PIF_VALUE: 18
PIF_VALUE: 18

## 2025-05-18 ASSESSMENT — PAIN SCALES - GENERAL
PAINLEVEL_OUTOF10: 0

## 2025-05-18 NOTE — PLAN OF CARE
Problem: Chronic Conditions and Co-morbidities  Goal: Patient's chronic conditions and co-morbidity symptoms are monitored and maintained or improved  5/18/2025 0523 by Britton Noel RN  Outcome: Progressing     Problem: Discharge Planning  Goal: Discharge to home or other facility with appropriate resources  5/18/2025 0523 by Britton Noel RN  Outcome: Progressing     Problem: Safety - Adult  Goal: Free from fall injury  5/18/2025 0523 by Britton Noel RN  Outcome: Progressing  Flowsheets (Taken 5/17/2025 1755 by Claribel Aguilar RN)  Free From Fall Injury: Instruct family/caregiver on patient safety     Problem: Pain  Goal: Verbalizes/displays adequate comfort level or baseline comfort level  5/18/2025 0523 by Britton Noel RN  Outcome: Progressing     Problem: ABCDS Injury Assessment  Goal: Absence of physical injury  5/18/2025 0523 by Britton Noel RN  Outcome: Progressing  Flowsheets (Taken 5/17/2025 1755 by Claribel Aguilar RN)  Absence of Physical Injury: Implement safety measures based on patient assessment     Problem: Skin/Tissue Integrity  Goal: Skin integrity remains intact  Description: 1.  Monitor for areas of redness and/or skin breakdown2.  Assess vascular access sites hourly3.  Every 4-6 hours minimum:  Change oxygen saturation probe site4.  Every 4-6 hours:  If on nasal continuous positive airway pressure, respiratory therapy assess nares and determine need for appliance change or resting period  5/18/2025 0523 by Britton Noel RN  Outcome: Progressing  Flowsheets (Taken 5/17/2025 1755 by Claribel Aguilar RN)  Skin Integrity Remains Intact: Monitor for areas of redness and/or skin breakdown     Problem: Safety - Medical Restraint  Goal: Remains free of injury from restraints (Restraint for Interference with Medical Device)  Description: INTERVENTIONS:1. Determine that other, less restrictive measures have been tried or would not be effective before applying the

## 2025-05-18 NOTE — PROGRESS NOTES
Nephrology Progress Note                                                                                                                                                                                                                                                                                                                                                               Office : [phone]     Fax :[phone]    Patient's Name: Susanne Jason  10:14 AM  5/18/2025    Reason for Consult:  Hyperkalemia   Requesting Physician:  No primary care provider on file.  Chief Complaint:    Chief Complaint   Patient presents with    Respiratory Distress     Pt been having difficulty breathing since this morning, on 3 lpm oxygen at baseline but spo2 is just 75%        Assessment/Plan     # Hyperkalemia   - Resolved  - S/p Lasix 20 mg x 1. S/p IVF  - On tube feeding (low K)  - Hold ARB  - Monitor     # Acute on chronic hypoxic respiratory failure  # COPD exacerbation  - Intubated on 5/15  - On abx and steroids   - Pulmonary on board    # HTN  - BP's controlled   - Monitor     # DM2  - Insulin management per primary       History of Present Ilness:    Susanne Jason is a 65 y.o. female with a PMH of COPD, chronic respiratory failure, HLD, HTN, and DM2, who presents with acute on chronic hypoxic respiratory failure secondary to COPD exacerbation. We are now being consulted for hyperkalemia.     Interval hx:    Intubated   K improved  Required straight cath  Creatinine stable  Good UOP  BP stable      Past Medical History:   Diagnosis Date    Asthma     COPD (chronic obstructive pulmonary disease) (HCC)     Diabetes mellitus (HCC)     GERD (gastroesophageal reflux disease)        Past Surgical History:   Procedure Laterality Date    COLONOSCOPY      LAPAROSCOPY      OTHER SURGICAL HISTORY  1/9/2015    PLANTAR FASCIOTOMY RIGHT FOOT; STEROID INJECTION LEFT HEEL    OTHER SURGICAL HISTORY      patient states surgery for

## 2025-05-18 NOTE — PROGRESS NOTES
ICU resident team at bedside pt not following commands. Fentanyl sedation paused per dr. Moeller.

## 2025-05-18 NOTE — PROGRESS NOTES
ICU PROGRESS      Hospital Day: 4  ICU Day: 3                                                        Code:Full Code  Admit Date: 5/13/2025  PCP: No primary care provider on file.                                  CC: Dyspnea    SUBJECTIVE:   Interval Events:  On 75mcg fentanyl and 0.6 precedex   On CPAP mode since 1pm yesterday 5/17  Bilateral wheeze on exam   F/u ABG pH 7.423 pCO2 61.7 Bicarb 33 pO2 75  Pulling volumes in 400s with rate of 16-17 RSBI ~45  - 1050mL UOP/24hrs +786mL since admit    Vent Settings: Vent Mode: CPAP/PS Resp Rate (Set): 16 bpm/Vt (Set, mL): 400 mL/ /FiO2 : 50 % / PEEP/CPAP (cmH2O): 8 / SpO2: 97 %    HPI:  Patient is a 65 year old female with a history of COPD on 3L O2 baseline, asthma, diabetes, hypertension, HLD who presented for worsening dyspnea. ICU consulted for closer monitoring of hypercarbic respiratory failure.    Patient initially presented to the ED on 5/13 with 2-3 days of worsening shortness of breath with cough. Her cough also was associated with yellow sputum. Had also been reporting fevers at home. Her SpO2 was in mid 70s at the time. She was started on continuous nebulizer tx in ED, given steroids and magnesium. Initial labs with pH 7.2 with pCO2 of 87.5. CXR with no evidence of pna. Troponin and BNP unremarkable. She was admitted to the PCU and started on BiPAP with duonebs, solumedrol 40 mg IV BID. Pulmonology following.    Since initiation of BiPAP, her respiratory symptoms have not significantly improved. Her pH and pCO2 initially began to improve, best was 7.27 / 74.3. However, on 5/15, began to once again worsen, pH 7.228.    Rapid response was called at approx 1330 on 5/15 for worsening somnolence. At that time, she was on AVAPS. She answers questions but is acutely confused, flailing extremities, more somnolent, which had not reportedly been the case prior. ABG was not acutely worse. However, given her tenuous respiratory status, she was transferred to ICU for  \"YEAST\", \"BACTERIA\", \"CLARITYU\", \"SPECGRAV\", \"LEUKOCYTESUR\", \"UROBILINOGEN\", \"BILIRUBINUR\", \"BLOODU\", \"GLUCOSEU\", \"AMORPHOUS\" in the last 72 hours.    Invalid input(s): \"KETONESU\"    XR ABDOMEN (KUB) (SINGLE AP VIEW)   Final Result      Mild right-sided stool burden. No evidence of bowel obstruction.      NG tube in the mid stomach.      Electronically signed by Prieto Ryan      XR ABDOMEN (KUB) (SINGLE AP VIEW)   Final Result   See above      Electronically signed by Zayda Major      XR CHEST PORTABLE   Final Result      ET tube in expected location.      No acute consolidation            Electronically signed by Zayda Major      XR CHEST PORTABLE   Final Result      No acute pulmonary pathology or significant change from the prior examination                  Electronically signed by Zayda Major      CTA PULMONARY W CONTRAST   Final Result   1.  No pulmonary emboli. No acute cardiopulmonary findings.   2.  Moderate hiatal hernia.      Electronically signed by Waldemar REGEN [employer]      XR CHEST PORTABLE   Final Result   1.  No acute cardiopulmonary findings.      Electronically signed by Waldemar [employer]EN [employer]          ASSESSMENT AND PLAN:   Patient is a 65 year old female with a history of COPD on 3L O2 baseline, asthma, diabetes, hypertension, HLD who presented for worsening dyspnea. ICU consulted for closer monitoring of hypercarbic respiratory failure. The following issues will be addressed during this hospitalization:    Respiratory  # Acute on chronic hypoxic and hypercapnic respiratory failure 2/2 COPD exacerbation  # Severe tracheomalacia  Patient had presented due to worsening dyspnea, productive cough, consistent with AECOPD. She has severe obstructive lung disease is requiring oxygen above her baseline of 3 L. On presentation patient had a pH of 7.2, with CO2 of 80 however she progressively got worse her pH dropped to 7.09 with CO2 greater than 98, prompting initiation of BiPAP. Despite BiPAP, she has

## 2025-05-18 NOTE — PROGRESS NOTES
[place]2.[place]    [facility] [place]      Name:  Susanne Jason /Age/Sex: 1960  (65 y.o. female)   MRN & CSN:  [id number] & [id number] Encounter Date/Time: 2025 8:17 AM EDT   Location:  [facility]/4516-01 PCP: No primary care provider on file.     Attending:Adrián Hernandez MD       Hospital Day: 6    HPI:    Assessment and Recommendations     Hospital course:    Susanne Jason is a 65 y.o. female with pmh of COPD, chronic respiratory failure, hyperlipidemia, hypertension, diabetes who presents with COPD (chronic obstructive pulmonary disease) (HCC) exacerbation with acute on chronic hypoxic respiratory failure with acute on chronic hypercarbic respiratory failure      Acute on chronic hypoxic respiratory failure with acute hypercarbic respiratory failure: Present on admission  - Patient hypoxic in the ED needing 15 L of oxygen to maintain sats greater than 97%.  On presentation patient had a pH of 7.2, with CO2 of 80 however she progressively got worse her pH dropped to 7.09 with CO2 greater than 98 and needed to be on BiPAP.   - On 3 L oxygen chronically, for asthma/COPD  - Placed on BiPAP on admission and then high flow nasal cannula; back on bipap which reportedly was kept on most of the night after periods of patient refusal to wear: Appears this has been noted in previous hospitalizations  - Required tx to ICU and subsequent intubation Hosp Day 1: 25  - Continue management for COPD exacerbation  - On TF  - Vent mgt: Per Pulmonary/CCT : CPAP mode since 25; planned extubation    Severe COPD/severe persistent asthma, current smoker   COPD exacerbation  - Chronically on 3 L oxygen  - Started on DuoNebs and steroids; azithromycin; management for asthma/COPD exacerbation    She follows with Pulmonary Eun Donovan at [facility] for her severe COPD/asthma. - Per notes from 3/2025:   \"With uncontrolled Asthma/ recurrent exacerbations/ peripheral eosinophilia will initiate biological agents-

## 2025-05-18 NOTE — PLAN OF CARE
Problem: Chronic Conditions and Co-morbidities  Goal: Patient's chronic conditions and co-morbidity symptoms are monitored and maintained or improved  5/18/2025 1320 by Zayda Garcia RN  Outcome: Progressing  5/18/2025 0523 by Britton Noel RN  Outcome: Progressing     Problem: Discharge Planning  Goal: Discharge to home or other facility with appropriate resources  5/18/2025 1320 by Zayda Garcia RN  Outcome: Progressing  5/18/2025 0523 by Britton Noel RN  Outcome: Progressing     Problem: Safety - Adult  Goal: Free from fall injury  5/18/2025 1320 by Zayda Garcia RN  Outcome: Progressing  Flowsheets (Taken 5/18/2025 0856)  Free From Fall Injury:   Based on caregiver fall risk screen, instruct family/caregiver to ask for assistance with transferring infant if caregiver noted to have fall risk factors   Instruct family/caregiver on patient safety  5/18/2025 0523 by Britton Noel RN  Outcome: Progressing  Flowsheets (Taken 5/17/2025 1755 by Claribel Aguilar RN)  Free From Fall Injury: Instruct family/caregiver on patient safety     Problem: Pain  Goal: Verbalizes/displays adequate comfort level or baseline comfort level  5/18/2025 1320 by Zayda Garcia RN  Outcome: Progressing  5/18/2025 0523 by Britton Noel RN  Outcome: Progressing     Problem: ABCDS Injury Assessment  Goal: Absence of physical injury  5/18/2025 1320 by Zayda Garcia RN  Outcome: Progressing  Flowsheets (Taken 5/18/2025 0856)  Absence of Physical Injury: Implement safety measures based on patient assessment  5/18/2025 0523 by Britton Noel RN  Outcome: Progressing  Flowsheets (Taken 5/17/2025 1755 by Claribel Aguilar RN)  Absence of Physical Injury: Implement safety measures based on patient assessment     Problem: Skin/Tissue Integrity  Goal: Skin integrity remains intact  Description: 1.  Monitor for areas of redness and/or skin breakdown2.  Assess vascular access sites hourly3.  Every 4-6

## 2025-05-19 LAB
ALBUMIN SERPL-MCNC: 3.6 G/DL (ref 3.4–5)
ANION GAP SERPL CALCULATED.3IONS-SCNC: 7 MMOL/L (ref 3–16)
BASOPHILS # BLD: 0 K/UL (ref 0–0.2)
BASOPHILS NFR BLD: 0.1 %
BUN SERPL-MCNC: 20 MG/DL (ref 7–20)
CALCIUM SERPL-MCNC: 9.6 MG/DL (ref 8.3–10.6)
CHLORIDE SERPL-SCNC: 96 MMOL/L (ref 99–110)
CO2 SERPL-SCNC: 33 MMOL/L (ref 21–32)
CREAT SERPL-MCNC: 0.6 MG/DL (ref 0.6–1.2)
DEPRECATED RDW RBC AUTO: 18 % (ref 12.4–15.4)
EOSINOPHIL # BLD: 0 K/UL (ref 0–0.6)
EOSINOPHIL NFR BLD: 0 %
GFR SERPLBLD CREATININE-BSD FMLA CKD-EPI: >90 ML/MIN/{1.73_M2}
GLUCOSE BLD-MCNC: 163 MG/DL (ref 70–99)
GLUCOSE BLD-MCNC: 178 MG/DL (ref 70–99)
GLUCOSE BLD-MCNC: 216 MG/DL (ref 70–99)
GLUCOSE BLD-MCNC: 237 MG/DL (ref 70–99)
GLUCOSE SERPL-MCNC: 226 MG/DL (ref 70–99)
HCT VFR BLD AUTO: 34.5 % (ref 36–48)
HGB BLD-MCNC: 11.1 G/DL (ref 12–16)
LYMPHOCYTES # BLD: 2.6 K/UL (ref 1–5.1)
LYMPHOCYTES NFR BLD: 22.9 %
MAGNESIUM SERPL-MCNC: 1.86 MG/DL (ref 1.8–2.4)
MCH RBC QN AUTO: 23.6 PG (ref 26–34)
MCHC RBC AUTO-ENTMCNC: 32.2 G/DL (ref 31–36)
MCV RBC AUTO: 73.2 FL (ref 80–100)
MONOCYTES # BLD: 1.4 K/UL (ref 0–1.3)
MONOCYTES NFR BLD: 12.4 %
NEUTROPHILS # BLD: 7.3 K/UL (ref 1.7–7.7)
NEUTROPHILS NFR BLD: 64.6 %
PERFORMED ON: ABNORMAL
PHOSPHATE SERPL-MCNC: 3.7 MG/DL (ref 2.5–4.9)
PLATELET # BLD AUTO: 216 K/UL (ref 135–450)
PMV BLD AUTO: 7.5 FL (ref 5–10.5)
POTASSIUM SERPL-SCNC: 4.6 MMOL/L (ref 3.5–5.1)
RBC # BLD AUTO: 4.72 M/UL (ref 4–5.2)
SODIUM SERPL-SCNC: 136 MMOL/L (ref 136–145)
WBC # BLD AUTO: 11.4 K/UL (ref 4–11)

## 2025-05-19 PROCEDURE — 94761 N-INVAS EAR/PLS OXIMETRY MLT: CPT

## 2025-05-19 PROCEDURE — 6360000002 HC RX W HCPCS: Performed by: INTERNAL MEDICINE

## 2025-05-19 PROCEDURE — 85025 COMPLETE CBC W/AUTO DIFF WBC: CPT

## 2025-05-19 PROCEDURE — 94003 VENT MGMT INPAT SUBQ DAY: CPT

## 2025-05-19 PROCEDURE — 80069 RENAL FUNCTION PANEL: CPT

## 2025-05-19 PROCEDURE — 2500000003 HC RX 250 WO HCPCS

## 2025-05-19 PROCEDURE — 51798 US URINE CAPACITY MEASURE: CPT

## 2025-05-19 PROCEDURE — 99291 CRITICAL CARE FIRST HOUR: CPT | Performed by: INTERNAL MEDICINE

## 2025-05-19 PROCEDURE — 6370000000 HC RX 637 (ALT 250 FOR IP): Performed by: INTERNAL MEDICINE

## 2025-05-19 PROCEDURE — 83735 ASSAY OF MAGNESIUM: CPT

## 2025-05-19 PROCEDURE — 51701 INSERT BLADDER CATHETER: CPT

## 2025-05-19 PROCEDURE — 2700000000 HC OXYGEN THERAPY PER DAY

## 2025-05-19 PROCEDURE — 2000000000 HC ICU R&B

## 2025-05-19 PROCEDURE — 94640 AIRWAY INHALATION TREATMENT: CPT

## 2025-05-19 PROCEDURE — 99233 SBSQ HOSP IP/OBS HIGH 50: CPT | Performed by: INTERNAL MEDICINE

## 2025-05-19 PROCEDURE — 6370000000 HC RX 637 (ALT 250 FOR IP)

## 2025-05-19 PROCEDURE — 36415 COLL VENOUS BLD VENIPUNCTURE: CPT

## 2025-05-19 PROCEDURE — 2500000003 HC RX 250 WO HCPCS: Performed by: INTERNAL MEDICINE

## 2025-05-19 PROCEDURE — 6360000002 HC RX W HCPCS

## 2025-05-19 PROCEDURE — 2580000003 HC RX 258

## 2025-05-19 RX ORDER — DIPHENHYDRAMINE HYDROCHLORIDE 50 MG/ML
50 INJECTION, SOLUTION INTRAMUSCULAR; INTRAVENOUS ONCE
Status: COMPLETED | OUTPATIENT
Start: 2025-05-19 | End: 2025-05-19

## 2025-05-19 RX ORDER — MECOBALAMIN 5000 MCG
5 TABLET,DISINTEGRATING ORAL NIGHTLY
Status: DISCONTINUED | OUTPATIENT
Start: 2025-05-19 | End: 2025-05-22 | Stop reason: HOSPADM

## 2025-05-19 RX ORDER — PREDNISONE 20 MG/1
40 TABLET ORAL ONCE
Status: DISCONTINUED | OUTPATIENT
Start: 2025-05-19 | End: 2025-05-19

## 2025-05-19 RX ORDER — CHLORTHALIDONE 25 MG/1
25 TABLET ORAL DAILY
Status: DISCONTINUED | OUTPATIENT
Start: 2025-05-19 | End: 2025-05-22 | Stop reason: HOSPADM

## 2025-05-19 RX ORDER — INSULIN LISPRO 100 [IU]/ML
0-16 INJECTION, SOLUTION INTRAVENOUS; SUBCUTANEOUS EVERY 6 HOURS
Status: DISCONTINUED | OUTPATIENT
Start: 2025-05-19 | End: 2025-05-22 | Stop reason: HOSPADM

## 2025-05-19 RX ORDER — MAGNESIUM SULFATE IN WATER 40 MG/ML
2000 INJECTION, SOLUTION INTRAVENOUS ONCE
Status: DISCONTINUED | OUTPATIENT
Start: 2025-05-19 | End: 2025-05-22 | Stop reason: HOSPADM

## 2025-05-19 RX ORDER — LABETALOL HYDROCHLORIDE 5 MG/ML
10 INJECTION, SOLUTION INTRAVENOUS ONCE
Status: DISCONTINUED | OUTPATIENT
Start: 2025-05-19 | End: 2025-05-19

## 2025-05-19 RX ORDER — AMLODIPINE BESYLATE 5 MG/1
5 TABLET ORAL NIGHTLY
Status: DISCONTINUED | OUTPATIENT
Start: 2025-05-19 | End: 2025-05-22 | Stop reason: HOSPADM

## 2025-05-19 RX ORDER — DIPHENHYDRAMINE HYDROCHLORIDE 50 MG/ML
25 INJECTION, SOLUTION INTRAMUSCULAR; INTRAVENOUS ONCE
Status: DISCONTINUED | OUTPATIENT
Start: 2025-05-19 | End: 2025-05-19

## 2025-05-19 RX ORDER — IPRATROPIUM BROMIDE AND ALBUTEROL SULFATE 2.5; .5 MG/3ML; MG/3ML
1 SOLUTION RESPIRATORY (INHALATION)
Status: DISCONTINUED | OUTPATIENT
Start: 2025-05-20 | End: 2025-05-22 | Stop reason: HOSPADM

## 2025-05-19 RX ADMIN — Medication 50 MCG/HR: at 04:51

## 2025-05-19 RX ADMIN — INSULIN LISPRO 4 UNITS: 100 INJECTION, SOLUTION INTRAVENOUS; SUBCUTANEOUS at 14:03

## 2025-05-19 RX ADMIN — SODIUM CHLORIDE, PRESERVATIVE FREE 40 MG: 5 INJECTION INTRAVENOUS at 08:16

## 2025-05-19 RX ADMIN — BUDESONIDE 500 MCG: 0.5 SUSPENSION RESPIRATORY (INHALATION) at 20:07

## 2025-05-19 RX ADMIN — ALBUTEROL SULFATE 2.5 MG: 2.5 SOLUTION RESPIRATORY (INHALATION) at 17:19

## 2025-05-19 RX ADMIN — SODIUM CHLORIDE, PRESERVATIVE FREE 10 ML: 5 INJECTION INTRAVENOUS at 08:16

## 2025-05-19 RX ADMIN — ARFORMOTEROL TARTRATE 15 MCG: 15 SOLUTION RESPIRATORY (INHALATION) at 20:07

## 2025-05-19 RX ADMIN — DEXMEDETOMIDINE 0.6 MCG/KG/HR: 100 INJECTION, SOLUTION INTRAVENOUS at 04:49

## 2025-05-19 RX ADMIN — BUDESONIDE 500 MCG: 0.5 SUSPENSION RESPIRATORY (INHALATION) at 08:38

## 2025-05-19 RX ADMIN — ENOXAPARIN SODIUM 40 MG: 100 INJECTION SUBCUTANEOUS at 08:15

## 2025-05-19 RX ADMIN — ROSUVASTATIN CALCIUM 20 MG: 20 TABLET, FILM COATED ORAL at 08:15

## 2025-05-19 RX ADMIN — IPRATROPIUM BROMIDE AND ALBUTEROL SULFATE 1 DOSE: 2.5; .5 SOLUTION RESPIRATORY (INHALATION) at 08:38

## 2025-05-19 RX ADMIN — MAGNESIUM SULFATE HEPTAHYDRATE 2000 MG: 40 INJECTION, SOLUTION INTRAVENOUS at 16:36

## 2025-05-19 RX ADMIN — PREDNISONE 40 MG: 20 TABLET ORAL at 08:15

## 2025-05-19 RX ADMIN — ALBUTEROL SULFATE 2.5 MG: 2.5 SOLUTION RESPIRATORY (INHALATION) at 20:06

## 2025-05-19 RX ADMIN — DOCUSATE SODIUM 50 MG AND SENNOSIDES 8.6 MG 2 TABLET: 8.6; 5 TABLET, FILM COATED ORAL at 08:18

## 2025-05-19 RX ADMIN — DIPHENHYDRAMINE HYDROCHLORIDE 50 MG: 50 INJECTION INTRAMUSCULAR; INTRAVENOUS at 16:33

## 2025-05-19 RX ADMIN — INSULIN LISPRO 4 UNITS: 100 INJECTION, SOLUTION INTRAVENOUS; SUBCUTANEOUS at 08:15

## 2025-05-19 RX ADMIN — POLYETHYLENE GLYCOL 3350 17 G: 17 POWDER, FOR SOLUTION ORAL at 08:15

## 2025-05-19 RX ADMIN — ARFORMOTEROL TARTRATE 15 MCG: 15 SOLUTION RESPIRATORY (INHALATION) at 08:38

## 2025-05-19 ASSESSMENT — PULMONARY FUNCTION TESTS
PIF_VALUE: 18
PIF_VALUE: 19
PIF_VALUE: 18
PIF_VALUE: 19
PIF_VALUE: 18
PIF_VALUE: 19

## 2025-05-19 ASSESSMENT — PAIN SCALES - GENERAL
PAINLEVEL_OUTOF10: 0

## 2025-05-19 NOTE — RT PROTOCOL NOTE
using Per Protocol order mode.        4-6 - enter or revise RT Bronchodilator order(s) to two equivalent RT bronchodilator orders with one order with BID Frequency and one order with Frequency of every 4 hours PRN wheezing or increased work of breathing using Per Protocol order mode.        7-10 - enter or revise RT Bronchodilator order(s) to two equivalent RT bronchodilator orders with one order with TID Frequency and one order with Frequency of every 4 hours PRN wheezing or increased work of breathing using Per Protocol order mode.       11-13 - enter or revise RT Bronchodilator order(s) to one equivalent RT bronchodilator order with QID Frequency and an Albuterol order with Frequency of every 4 hours PRN wheezing or increased work of breathing using Per Protocol order mode.      Greater than 13 - enter or revise RT Bronchodilator order(s) to one equivalent RT bronchodilator order with every 4 hours Frequency and an Albuterol order with Frequency of every 2 hours PRN wheezing or increased work of breathing using Per Protocol order mode.     RT to enter RT Home Evaluation for COPD & MDI Assessment order using Per Protocol order mode.    Electronically signed by Elma Domínguez RCP on 5/19/2025 at 4:18 PM

## 2025-05-19 NOTE — PROGRESS NOTES
V2.0    [facility] [place] Progress Note      Name:  Susanne Jason /Age/Sex: 1960  (65 y.o. female)   MRN & CSN:  [id number] & [id number] Encounter Date/Time: 2025 8:17 AM EDT   Location:  [facility]/4516-01 PCP: No primary care provider on file.     Attending:Adrián Hernandez MD       Hospital Day: 7    HPI:    Assessment and Recommendations     Hospital course:    Susanne Jason is a 65 y.o. female with pmh of COPD, chronic respiratory failure, hyperlipidemia, hypertension, diabetes who presents with COPD (chronic obstructive pulmonary disease) (HCC) exacerbation with acute on chronic hypoxic respiratory failure with acute on chronic hypercarbic respiratory failure      Acute on chronic hypoxic respiratory failure with acute hypercarbic respiratory failure: Present on admission  - Patient hypoxic in the ED needing 15 L of oxygen to maintain sats greater than 97%.  On presentation patient had a pH of 7.2, with CO2 of 80 however she progressively got worse her pH dropped to 7.09 with CO2 greater than 98 and needed to be on BiPAP.   - On 3 L oxygen chronically, for asthma/COPD  - Placed on BiPAP on admission and then high flow nasal cannula; back on bipap which reportedly was kept on most of the night after periods of patient refusal to wear: Appears this has been noted in previous hospitalizations  - Required tx to ICU and subsequent intubation Hosp Day 1: 25  - Continue management for COPD exacerbation  - On TF  - Liberated from the vent 2025  - PT/OT/SLP consult    Severe COPD/severe persistent asthma, current smoker   COPD exacerbation  - Chronically on 3 L oxygen  - DuoNebs and steroids; azithromycin; management for asthma/COPD exacerbation    She follows with Pulmonary Eun Donovan at [facility] for her severe COPD/asthma. - Per notes from 3/2025:   \"With uncontrolled Asthma/ recurrent exacerbations/ peripheral eosinophilia will initiate biological agents- prescription for Dupixent

## 2025-05-19 NOTE — PROGRESS NOTES
Patient has been successfully weaned from Mechanical Ventilation.  RSBI before extubation was 28 and SpO2 of 100 on 40% FiO2.  Patient extubated and placed on 4 liters/min via nasal cannula.  Post extubation SpO2 is 93% with HR  90 bpm and RR 26 breaths/min.  Patient had strong cough that was productive of white and yellow sputum.  Extubation Well tolerated by patient..

## 2025-05-19 NOTE — PROGRESS NOTES
Palliative Care Chart Review  and Check in Note:     NAME:  Susanne Jason  Admit Date: 5/13/2025  Hospital Day:  Hospital Day: 7   Current Code status: Full Code    Palliative care is continuing to following Ms. Jason for symptom management,  and goals of care discussion as needed. Patient's chart reviewed today 5/19/25.      Saw pt at the bedside with her long term partner Abebe. Pt remains intubated, currently on a breathing trial. She is following commands appropriately and respirations appear even and unlabored. Introduced palliative care to Abebe and discussed pt's HCPOA which she completed last week. He denied any questions.     The following are the currently established goals/code status, and Symptom management.     Goals of care: Pt wants to avoid intubation but is OK with it if necessary. We discussed that she needs to be compliant with bipap if she wants to avoid intubation. We did not get to discuss possible tracheostomy as pt was agitated and unable to be re-directed. Pt's daughter Lisbeth did not think pt would want a trach but was unsure. I encouraged her to discuss with the pt.     We completed a HCPOA 5/14 and she named her long term partner Abebe Hatch as the primary agent, daughter Lisbeth as the first alternate agent and daughter Dora as the second alternate agent.     Code status: Full code, discussed with pt in depth 5/14.     Discharge plan: TBD pending hospital course.       HENRIQUE Singh - CNP  05/19/25  10:29 AM

## 2025-05-19 NOTE — PROGRESS NOTES
Patient complains of needing to urinate, Purewick in place and draining small amounts of urine, patient still feels like she needs to urinate. Attempted bedpan, patient not able to urinate. Patient requested to try to get up to the bathroom, it quickly became apparent patient was not going to be able to make it to the bathroom, bedside commode obtained for patient and placed next to bed. Patient only able to make it to edge of bed before the patient became markedly dyspneic and desaturated into the 50's. Patient helped back to bed, oxygen increased, RT and MD called to bedside. After patient recovered, offered to straight cath patient, she is refusing at this time.

## 2025-05-19 NOTE — CARE COORDINATION
Case Management Assessment           Daily Note                 Date/ Time of Note: 5/19/2025 9:12 AM         Note completed by: Bobby Biswas RN    Patient Name: Susanne Jason  YOB: 1960    Diagnosis:COPD (chronic obstructive pulmonary disease) (HCC) [J44.9]  COPD exacerbation (HCC) [J44.1]  Acute on chronic respiratory failure with hypoxia and hypercapnia ([facility]) [J96.21, J96.22]  Patient Admission Status: Inpatient  Date of Admission:5/13/2025 11:24 PM    Length of Stay: 5 GLOS: GMLOS: 4.9 Readmission Risk Score: Readmission Risk Score: 15.3    Current Plan of Care: intb, sed, plan to poss extubate today.   ________________________________________________________________________________________    Would benefit from evals prior to dc when medically stable  _________________________________________________________________________________  Discharge Plan: To Be Determined DUE TO: medical course  Pre-cert required for SNF: YES  COVID Result:    Lab Results   Component Value Date/Time    COVID19 NOT DETECTED 05/13/2025 11:38 PM       Transportation PLAN for discharge:  tbd    Tentative discharge date: tbd    Potential assistance Purchasing Medications: Potential Assistance Purchasing Medications: No  Does Patient want to participate in local refill/ meds to beds program?:      Current barriers to discharge: Medical complications    Referrals completed:     Resources/ information provided:   ________________________________________________________________________________________  Case Management Notes: patient is from home w family. Remains intubated/sedated at this time. Dispo planning on hold until more medically stable. Would benefit from therapy evals once more medically stable. CM following     Susanne and her family were provided with choice of provider; she and her family are in agreement with the discharge plan.    Emergency Contacts:  Extended Emergency Contact Information  Primary

## 2025-05-19 NOTE — PROGRESS NOTES
Nephrology Progress Note                                                                                                                                                                                                                                                                                                                                                               Office : [phone]     Fax :[phone]    Patient's Name: Susanne Jason  9:35 AM  5/19/2025    Reason for Consult:  Hyperkalemia   Requesting Physician:  No primary care provider on file.  Chief Complaint:    Chief Complaint   Patient presents with    Respiratory Distress     Pt been having difficulty breathing since this morning, on 3 lpm oxygen at baseline but spo2 is just 75%        Assessment/Plan     # Hyperkalemia - Resolved  - S/p Lasix 20 mg x 1. S/p IVF  - On tube feeding (low K)  - Holding ARB  - Monitor     # Acute on chronic hypoxic respiratory failure  # COPD exacerbation  - Intubated on 5/15  - On abx and steroids   - Pulmonary on board    # Hypotension   - Off meds  - BP elevated now   - add CCB   - Monitor     # DM2  - Insulin management per primary       History of Present Ilness:    Susanne Jason is a 65 y.o. female with a PMH of COPD, chronic respiratory failure, HLD, HTN, and DM2, who presents with acute on chronic hypoxic respiratory failure secondary to COPD exacerbation. We are now being consulted for hyperkalemia.     Interval hx:    Remains intubated and sedated on pressure support   Electrolytes stable  Good UOP      Past Medical History:   Diagnosis Date    Asthma     COPD (chronic obstructive pulmonary disease) (HCC)     Diabetes mellitus (HCC)     GERD (gastroesophageal reflux disease)        Past Surgical History:   Procedure Laterality Date    COLONOSCOPY      LAPAROSCOPY      OTHER SURGICAL HISTORY  1/9/2015    PLANTAR FASCIOTOMY RIGHT FOOT; STEROID INJECTION LEFT HEEL    OTHER SURGICAL HISTORY

## 2025-05-19 NOTE — PLAN OF CARE
Problem: Chronic Conditions and Co-morbidities  Goal: Patient's chronic conditions and co-morbidity symptoms are monitored and maintained or improved  Outcome: Progressing     Problem: Discharge Planning  Goal: Discharge to home or other facility with appropriate resources  Outcome: Progressing     Problem: Safety - Adult  Goal: Free from fall injury  Outcome: Progressing     Problem: Pain  Goal: Verbalizes/displays adequate comfort level or baseline comfort level  Outcome: Progressing     Problem: ABCDS Injury Assessment  Goal: Absence of physical injury  Outcome: Progressing     Problem: Skin/Tissue Integrity  Goal: Skin integrity remains intact  Description: 1.  Monitor for areas of redness and/or skin breakdown2.  Assess vascular access sites hourly3.  Every 4-6 hours minimum:  Change oxygen saturation probe site4.  Every 4-6 hours:  If on nasal continuous positive airway pressure, respiratory therapy assess nares and determine need for appliance change or resting period  Outcome: Progressing     Problem: Safety - Medical Restraint  Goal: Remains free of injury from restraints (Restraint for Interference with Medical Device)  Description: INTERVENTIONS:1. Determine that other, less restrictive measures have been tried or would not be effective before applying the restraint2. Evaluate the patient's condition at the time of restraint application3. Inform patient/family regarding the reason for restraint4. Q2H: Monitor safety, psychosocial status, comfort, nutrition and hydration  Outcome: Progressing  Flowsheets  Taken 5/19/2025 0200 by Britton Noel RN  Remains free of injury from restraints (restraint for interference with medical device): Every 2 hours: Monitor safety, psychosocial status, comfort, nutrition and hydration  Taken 5/19/2025 0000 by Britton Noel, RN  Remains free of injury from restraints (restraint for interference with medical device): Every 2 hours: Monitor safety, psychosocial

## 2025-05-19 NOTE — PROGRESS NOTES
ICU PROGRESS      Hospital Day: 5  ICU Day: 4                                                        Code:Full Code  Admit Date: 5/13/2025  PCP: No primary care provider on file.                                  CC: Dyspnea    SUBJECTIVE:   Interval Events:  - No acute events overnight. Vital signs stable  - Remains intubated on pressure support  - Sedated with fentanyl and propofol. Episode of agitation yesterday when weaning off fentanyl.   - Now precedex 0.6, fentanyl 50  - On exam, wheezes significantly improved  - Blood sugars elevated 240s  - Interval plan: Continue steroids, inhalers. Will attempt to wean sedation and extubate today. Increase insulin to high dose sliding scale.    Vent Settings: Vent Mode: CPAP/PS Resp Rate (Set): 16 bpm/Vt (Set, mL): 400 mL/ /FiO2 : 50 % / PEEP/CPAP (cmH2O): 8 / SpO2: 100 %    HPI:  Patient is a 65 year old female with a history of COPD on 3L O2 baseline, asthma, diabetes, hypertension, HLD who presented for worsening dyspnea. ICU consulted for closer monitoring of hypercarbic respiratory failure.    Patient initially presented to the ED on 5/13 with 2-3 days of worsening shortness of breath with cough. Her cough also was associated with yellow sputum. Had also been reporting fevers at home. Her SpO2 was in mid 70s at the time. She was started on continuous nebulizer tx in ED, given steroids and magnesium. Initial labs with pH 7.2 with pCO2 of 87.5. CXR with no evidence of pna. Troponin and BNP unremarkable. She was admitted to the PCU and started on BiPAP with duonebs, solumedrol 40 mg IV BID. Pulmonology following.    Since initiation of BiPAP, her respiratory symptoms have not significantly improved. Her pH and pCO2 initially began to improve, best was 7.27 / 74.3. However, on 5/15, began to once again worsen, pH 7.228.    Rapid response was called at approx 1330 on 5/15 for worsening somnolence. At that time, she was on AVAPS. She answers questions but is acutely  Medicine Resident  5/19/2025  7:06 AM        PULMONARY AND CRITICAL CARE ATTENDING:  Patient was seen, examined and discussed with Dr. Guerra PGY-1. I agree with the history of present illness, past medical/surgical histories, family history, social history, medication list and allergies as listed. The review of systems is as noted above. My physical exam confirms the findings listed above.   Chart was reviewed including Labs, CXR, CT scan, EKG, and Medical records confirm the findings noted above.   I edited the note where appropriate.    Briefly, this is a 65 y.o. female admitted to ICU with AECOPD requiring mechanical ventilation.   INTERVAL HISTORY:  Did ok on SBT yesterday but mentation was the main limiting step. Doing well on SBT without problems.    /68   Pulse 75   Temp 98.4 °F (36.9 °C) (Temporal)   Resp 18   Ht 1.549 m (5' 1\")   Wt 75.1 kg (165 lb 9.1 oz)   LMP 01/09/2013   SpO2 100%   BMI 31.28 kg/m²     Intake/Output Summary (Last 24 hours) at 5/19/2025 0848  Last data filed at 5/19/2025 0600  Gross per 24 hour   Intake 1550.49 ml   Output 1950 ml   Net -399.51 ml    SpO2: 100 %  Ventilator settings  - Vent Mode: CPAP/PS  - Ventilator Day(s): 4  -    - PEEP/CPAP (cmH2O): 8  - FiO2 : 40 %  -    - Peak Inspiratory Pressure (cmH2O): 19 cmH2O  - Plateau Pressure (cm H2O): 23 cm H2O  - ETCO2 (mmHg):  (UTO/troubleshooting)        Peripheral IV 05/14/25 Left;Proximal;Anterior Forearm (Active)   Number of days: 4       Peripheral IV 05/15/25 Right Antecubital (Active)   Number of days: 3       ASSESSMENT:  Acute hypoxic and hypercapnic respiratory failure   Intubated 5/15  AECOPD   Severe tracheomalacia    Bronchoscopy 5/15  DM2  GERD    PLAN:  Ventilator settings were reviewed, PSV, continue lung protective strategies.   Continue SBT with aim to extubate  Continue Brovana/Pulmicort and Duonebs  Completed steroids.   ICU Glycemic goal 140-180 mg/dL  Diet: NPO  IVF: N/A  GI prophylaxis: elevate

## 2025-05-19 NOTE — PROGRESS NOTES
Patient's voice remains weak and hoarse, patient endorses sore throat but states she does not feel like she is having difficulty breathing. Tolerating ice chips. Partner Abebe at the bedside.

## 2025-05-20 ENCOUNTER — APPOINTMENT (OUTPATIENT)
Dept: GENERAL RADIOLOGY | Age: 65
DRG: 140 | End: 2025-05-20
Attending: INTERNAL MEDICINE
Payer: MEDICAID

## 2025-05-20 LAB
ALBUMIN SERPL-MCNC: 3.8 G/DL (ref 3.4–5)
ANION GAP SERPL CALCULATED.3IONS-SCNC: 16 MMOL/L (ref 3–16)
ANISOCYTOSIS BLD QL SMEAR: ABNORMAL
BASE EXCESS BLDA CALC-SCNC: 9 MMOL/L (ref -3–3)
BASOPHILS # BLD: 0 K/UL (ref 0–0.2)
BASOPHILS NFR BLD: 0 %
BUN SERPL-MCNC: 17 MG/DL (ref 7–20)
CA-I BLD-SCNC: 1.28 MMOL/L (ref 1.12–1.32)
CALCIUM SERPL-MCNC: 10 MG/DL (ref 8.3–10.6)
CHLORIDE SERPL-SCNC: 96 MMOL/L (ref 99–110)
CO2 BLDA-SCNC: 36 MMOL/L
CO2 SERPL-SCNC: 25 MMOL/L (ref 21–32)
CREAT SERPL-MCNC: 0.7 MG/DL (ref 0.6–1.2)
DEPRECATED RDW RBC AUTO: 18.3 % (ref 12.4–15.4)
EOSINOPHIL # BLD: 0 K/UL (ref 0–0.6)
EOSINOPHIL NFR BLD: 0 %
GFR SERPLBLD CREATININE-BSD FMLA CKD-EPI: >90 ML/MIN/{1.73_M2}
GLUCOSE BLD-MCNC: 140 MG/DL (ref 70–99)
GLUCOSE BLD-MCNC: 144 MG/DL (ref 70–99)
GLUCOSE BLD-MCNC: 157 MG/DL (ref 70–99)
GLUCOSE BLD-MCNC: 160 MG/DL (ref 70–99)
GLUCOSE BLD-MCNC: 188 MG/DL (ref 70–99)
GLUCOSE SERPL-MCNC: 121 MG/DL (ref 70–99)
HCO3 BLDA-SCNC: 34 MMOL/L (ref 21–29)
HCT VFR BLD AUTO: 38.6 % (ref 36–48)
HGB BLD-MCNC: 12.5 G/DL (ref 12–16)
LACTATE BLD-SCNC: 0.93 MMOL/L (ref 0.4–2)
LYMPHOCYTES # BLD: 3.5 K/UL (ref 1–5.1)
LYMPHOCYTES NFR BLD: 23 %
MACROCYTES BLD QL SMEAR: ABNORMAL
MAGNESIUM SERPL-MCNC: 2.05 MG/DL (ref 1.8–2.4)
MCH RBC QN AUTO: 23.9 PG (ref 26–34)
MCHC RBC AUTO-ENTMCNC: 32.3 G/DL (ref 31–36)
MCV RBC AUTO: 74 FL (ref 80–100)
MICROCYTES BLD QL SMEAR: ABNORMAL
MONOCYTES # BLD: 1.5 K/UL (ref 0–1.3)
MONOCYTES NFR BLD: 10 %
NEUTROPHILS # BLD: 10.3 K/UL (ref 1.7–7.7)
NEUTROPHILS NFR BLD: 67 %
OVALOCYTES BLD QL SMEAR: ABNORMAL
PCO2 BLDA: 55.2 MM HG (ref 35–45)
PERFORMED ON: ABNORMAL
PH BLDA: 7.4 [PH] (ref 7.35–7.45)
PHOSPHATE SERPL-MCNC: 3.6 MG/DL (ref 2.5–4.9)
PLATELET # BLD AUTO: 285 K/UL (ref 135–450)
PMV BLD AUTO: 8.3 FL (ref 5–10.5)
PO2 BLDA: 95.6 MM HG (ref 75–108)
POC SAMPLE TYPE: ABNORMAL
POTASSIUM BLD-SCNC: 4.2 MMOL/L (ref 3.5–5.1)
POTASSIUM SERPL-SCNC: ABNORMAL MMOL/L (ref 3.5–5.1)
RBC # BLD AUTO: 5.22 M/UL (ref 4–5.2)
SAO2 % BLDA: 97 % (ref 93–100)
SCHISTOCYTES BLD QL SMEAR: ABNORMAL
SODIUM BLD-SCNC: 144 MMOL/L (ref 136–145)
SODIUM SERPL-SCNC: 137 MMOL/L (ref 136–145)
STOMATOCYTES BLD QL SMEAR: ABNORMAL
TARGETS BLD QL SMEAR: ABNORMAL
WBC # BLD AUTO: 15.3 K/UL (ref 4–11)

## 2025-05-20 PROCEDURE — 94761 N-INVAS EAR/PLS OXIMETRY MLT: CPT

## 2025-05-20 PROCEDURE — 6360000002 HC RX W HCPCS: Performed by: INTERNAL MEDICINE

## 2025-05-20 PROCEDURE — 82947 ASSAY GLUCOSE BLOOD QUANT: CPT

## 2025-05-20 PROCEDURE — 97166 OT EVAL MOD COMPLEX 45 MIN: CPT

## 2025-05-20 PROCEDURE — 97530 THERAPEUTIC ACTIVITIES: CPT

## 2025-05-20 PROCEDURE — 82803 BLOOD GASES ANY COMBINATION: CPT

## 2025-05-20 PROCEDURE — 83605 ASSAY OF LACTIC ACID: CPT

## 2025-05-20 PROCEDURE — 2700000000 HC OXYGEN THERAPY PER DAY

## 2025-05-20 PROCEDURE — 6370000000 HC RX 637 (ALT 250 FOR IP)

## 2025-05-20 PROCEDURE — 74230 X-RAY XM SWLNG FUNCJ C+: CPT

## 2025-05-20 PROCEDURE — 2500000003 HC RX 250 WO HCPCS: Performed by: INTERNAL MEDICINE

## 2025-05-20 PROCEDURE — 85025 COMPLETE CBC W/AUTO DIFF WBC: CPT

## 2025-05-20 PROCEDURE — 83735 ASSAY OF MAGNESIUM: CPT

## 2025-05-20 PROCEDURE — 84132 ASSAY OF SERUM POTASSIUM: CPT

## 2025-05-20 PROCEDURE — 94640 AIRWAY INHALATION TREATMENT: CPT

## 2025-05-20 PROCEDURE — 6370000000 HC RX 637 (ALT 250 FOR IP): Performed by: INTERNAL MEDICINE

## 2025-05-20 PROCEDURE — 80069 RENAL FUNCTION PANEL: CPT

## 2025-05-20 PROCEDURE — 92526 ORAL FUNCTION THERAPY: CPT

## 2025-05-20 PROCEDURE — 99233 SBSQ HOSP IP/OBS HIGH 50: CPT | Performed by: INTERNAL MEDICINE

## 2025-05-20 PROCEDURE — 92611 MOTION FLUOROSCOPY/SWALLOW: CPT

## 2025-05-20 PROCEDURE — 97162 PT EVAL MOD COMPLEX 30 MIN: CPT

## 2025-05-20 PROCEDURE — 36600 WITHDRAWAL OF ARTERIAL BLOOD: CPT

## 2025-05-20 PROCEDURE — 82330 ASSAY OF CALCIUM: CPT

## 2025-05-20 PROCEDURE — 6360000002 HC RX W HCPCS

## 2025-05-20 PROCEDURE — 2060000000 HC ICU INTERMEDIATE R&B

## 2025-05-20 PROCEDURE — 84295 ASSAY OF SERUM SODIUM: CPT

## 2025-05-20 PROCEDURE — 36415 COLL VENOUS BLD VENIPUNCTURE: CPT

## 2025-05-20 PROCEDURE — 92610 EVALUATE SWALLOWING FUNCTION: CPT

## 2025-05-20 RX ORDER — OXYCODONE AND ACETAMINOPHEN 5; 325 MG/1; MG/1
1 TABLET ORAL ONCE
Refills: 0 | Status: COMPLETED | OUTPATIENT
Start: 2025-05-20 | End: 2025-05-20

## 2025-05-20 RX ORDER — PANTOPRAZOLE SODIUM 40 MG/1
40 TABLET, DELAYED RELEASE ORAL
Status: DISCONTINUED | OUTPATIENT
Start: 2025-05-21 | End: 2025-05-22 | Stop reason: HOSPADM

## 2025-05-20 RX ORDER — BISACODYL 10 MG
10 SUPPOSITORY, RECTAL RECTAL ONCE
Status: COMPLETED | OUTPATIENT
Start: 2025-05-20 | End: 2025-05-20

## 2025-05-20 RX ADMIN — AMLODIPINE BESYLATE 5 MG: 5 TABLET ORAL at 00:13

## 2025-05-20 RX ADMIN — ROSUVASTATIN CALCIUM 20 MG: 20 TABLET, FILM COATED ORAL at 08:20

## 2025-05-20 RX ADMIN — AMLODIPINE BESYLATE 5 MG: 5 TABLET ORAL at 20:28

## 2025-05-20 RX ADMIN — IPRATROPIUM BROMIDE AND ALBUTEROL SULFATE 1 DOSE: .5; 3 SOLUTION RESPIRATORY (INHALATION) at 19:37

## 2025-05-20 RX ADMIN — BUDESONIDE 500 MCG: 0.5 SUSPENSION RESPIRATORY (INHALATION) at 19:37

## 2025-05-20 RX ADMIN — OXYCODONE AND ACETAMINOPHEN 1 TABLET: 325; 5 TABLET ORAL at 04:06

## 2025-05-20 RX ADMIN — IPRATROPIUM BROMIDE AND ALBUTEROL SULFATE 1 DOSE: .5; 3 SOLUTION RESPIRATORY (INHALATION) at 06:01

## 2025-05-20 RX ADMIN — DOCUSATE SODIUM 50 MG AND SENNOSIDES 8.6 MG 2 TABLET: 8.6; 5 TABLET, FILM COATED ORAL at 08:20

## 2025-05-20 RX ADMIN — SODIUM CHLORIDE, PRESERVATIVE FREE 10 ML: 5 INJECTION INTRAVENOUS at 08:29

## 2025-05-20 RX ADMIN — SODIUM CHLORIDE, PRESERVATIVE FREE 10 ML: 5 INJECTION INTRAVENOUS at 20:30

## 2025-05-20 RX ADMIN — IPRATROPIUM BROMIDE AND ALBUTEROL SULFATE 1 DOSE: .5; 3 SOLUTION RESPIRATORY (INHALATION) at 08:30

## 2025-05-20 RX ADMIN — ARFORMOTEROL TARTRATE 15 MCG: 15 SOLUTION RESPIRATORY (INHALATION) at 19:37

## 2025-05-20 RX ADMIN — ENOXAPARIN SODIUM 40 MG: 100 INJECTION SUBCUTANEOUS at 08:20

## 2025-05-20 RX ADMIN — INSULIN LISPRO 4 UNITS: 100 INJECTION, SOLUTION INTRAVENOUS; SUBCUTANEOUS at 18:24

## 2025-05-20 RX ADMIN — BISACODYL 10 MG: 10 SUPPOSITORY RECTAL at 18:24

## 2025-05-20 RX ADMIN — CHLORTHALIDONE 25 MG: 25 TABLET ORAL at 08:20

## 2025-05-20 ASSESSMENT — PAIN DESCRIPTION - ORIENTATION
ORIENTATION: MID;LOWER
ORIENTATION: MID

## 2025-05-20 ASSESSMENT — PAIN DESCRIPTION - ONSET: ONSET: ON-GOING

## 2025-05-20 ASSESSMENT — PAIN DESCRIPTION - DESCRIPTORS
DESCRIPTORS: DISCOMFORT
DESCRIPTORS: DISCOMFORT

## 2025-05-20 ASSESSMENT — PAIN DESCRIPTION - PAIN TYPE: TYPE: CHRONIC PAIN

## 2025-05-20 ASSESSMENT — PAIN DESCRIPTION - FREQUENCY: FREQUENCY: CONTINUOUS

## 2025-05-20 ASSESSMENT — PAIN DESCRIPTION - LOCATION
LOCATION: HEAD;BACK
LOCATION: BACK

## 2025-05-20 ASSESSMENT — PAIN SCALES - GENERAL
PAINLEVEL_OUTOF10: 8
PAINLEVEL_OUTOF10: 8

## 2025-05-20 ASSESSMENT — PAIN - FUNCTIONAL ASSESSMENT: PAIN_FUNCTIONAL_ASSESSMENT: ACTIVITIES ARE NOT PREVENTED

## 2025-05-20 NOTE — PROGRESS NOTES
V2.0    [facility] [place] Progress Note      Name:  Susanne Jason /Age/Sex: 1960  (65 y.o. female)   MRN & CSN:  [id number] & [id number] Encounter Date/Time: 2025 8:17 AM EDT   Location:  [facility]/4516-01 PCP: No primary care provider on file.     Attending:Adrián Hernandez MD       Hospital Day: 8    HPI:    Assessment and Recommendations     Hospital course:    Susanne Jason is a 65 y.o. female with pmh of COPD, chronic respiratory failure, hyperlipidemia, hypertension, diabetes who presents with COPD (chronic obstructive pulmonary disease) (HCC) exacerbation with acute on chronic hypoxic respiratory failure with acute on chronic hypercarbic respiratory failure      Acute on chronic hypoxic respiratory failure with acute hypercarbic respiratory failure: Present on admission  - Patient hypoxic in the ED needing 15 L of oxygen to maintain sats greater than 97%.  On presentation patient had a pH of 7.2, with CO2 of 80 however she progressively got worse her pH dropped to 7.09 with CO2 greater than 98 and needed to be on BiPAP.   - On 3 L oxygen chronically, for asthma/COPD  - Placed on BiPAP on admission and then high flow nasal cannula; back on bipap which reportedly was kept on most of the night after periods of patient refusal to wear: Appears this has been noted in previous hospitalizations  - Required tx to ICU and subsequent intubation Hosp Day 1: 25  - Continue management for COPD exacerbation  - On TF  - Liberated from the vent 2025  - SLP consulted: JAMES today   PT/OT: Pending, to guide disposition    COPD exacerbation  - Chronically on 3 L oxygen  - Was treated with DuoNebs and steroids; azithromycin; management for asthma/COPD exacerbation  - Improved now, appears near baseline  - Wean oxygen to baseline as able    Severe COPD/severe persistent asthma, current smoker   She follows with Pulmonary Eun Donovan at [facility] for her severe COPD/asthma. - Per notes from 3/2025:   \"With  AND SOFT TISSUES: Unremarkable. UPPER ABDOMEN: Moderate hiatal hernia. BONES: No acute or suspicious abnormality.     1.  No pulmonary emboli. No acute cardiopulmonary findings. 2.  Moderate hiatal hernia. Electronically signed by [email or web address]    XR CHEST PORTABLE  Result Date: 5/13/2025  EXAM: XR CHEST PORTABLE INDICATION: shortness of breath COMPARISON: 6/22/2024 and other prior studies FINDINGS: Medical Devices: None. Lungs: The lungs are clear. Pleura: No pneumothorax or pleural effusion. Heart and Mediastinum: The cardiomediastinal silhouette is within normal limits. Bones: No acute suspicious abnormality.     1.  No acute cardiopulmonary findings. Electronically signed by [email or web address]      CBC:   Recent Labs     05/18/25  0533 05/19/25  0540 05/20/25  0551   WBC 9.7 11.4* 15.3*   HGB 10.7* 11.1* 12.5    216 285     BMP:    Recent Labs     05/18/25  0533 05/19/25  0540 05/20/25  0551    136 137   K 4.6 4.6 see below   CL 97* 96* 96*   CO2 33* 33* 25   BUN 28* 20 17   CREATININE 0.6 0.6 0.7   GLUCOSE 211* 226* 121*     Hepatic: No results for input(s): \"AST\", \"ALT\", \"BILITOT\", \"ALKPHOS\" in the last 72 hours.    Invalid input(s): \"ALB\"  Lipids:   Lab Results   Component Value Date/Time    TRIG 224 12/05/2023 05:27 AM     Hemoglobin A1C:   Lab Results   Component Value Date/Time    LABA1C 7.8 06/24/2024 08:30 AM     TSH: No results found for: \"TSH\"  Troponin: No results found for: \"TROPONINT\"  Lactic Acid:   No results for input(s): \"LACTA\" in the last 72 hours.    BNP:   No results for input(s): \"PROBNP\" in the last 72 hours.    UA:  Lab Results   Component Value Date/Time    NITRU Negative 12/09/2023 09:32 PM    COLORU Yellow 12/09/2023 09:32 PM    PHUR 6.5 12/09/2023 09:32 PM    WBCUA 0-2 12/03/2023 03:15 AM    RBCUA 0-2 12/03/2023 03:15 AM    BACTERIA 3+ 12/03/2023 03:15 AM    CLARITYU Clear 12/09/2023 09:32 PM    LEUKOCYTESUR Negative 12/09/2023 09:32 PM    UROBILINOGEN 0.2 12/09/2023 09:32 PM

## 2025-05-20 NOTE — RT PROTOCOL NOTE
RT Inhaler-Nebulizer Bronchodilator Protocol Note    There is a bronchodilator order in the chart from a provider indicating to follow the RT Bronchodilator Protocol and there is an “Initiate RT Inhaler-Nebulizer Bronchodilator Protocol” order as well (see protocol at bottom of note).    CXR Findings:  No results found.    The findings from the last RT Protocol Assessment were as follows:   History Pulmonary Disease: Chronic pulmonary disease  Respiratory Pattern: Dyspnea on exertion or RR 21-25 bpm  Breath Sounds: Slightly diminished and/or crackles  Cough: Strong, spontaneous, non-productive  Indication for Bronchodilator Therapy: On home bronchodilators  Bronchodilator Assessment Score: 6    Aerosolized bronchodilator medication orders have been revised according to the RT Inhaler-Nebulizer Bronchodilator Protocol below.    Respiratory Therapist to perform RT Therapy Protocol Assessment initially then follow the protocol.  Repeat RT Therapy Protocol Assessment PRN for score 0-3 or on second treatment, BID, and PRN for scores above 3.    No Indications - adjust the frequency to every 6 hours PRN wheezing or bronchospasm, if no treatments needed after 48 hours then discontinue using Per Protocol order mode.     If indication present, adjust the RT bronchodilator orders based on the Bronchodilator Assessment Score as indicated below.  Use Inhaler orders unless patient has one or more of the following: on home nebulizer, not able to hold breath for 10 seconds, is not alert and oriented, cannot activate and use MDI correctly, or respiratory rate 25 breaths per minute or more, then use the equivalent nebulizer order(s) with same Frequency and PRN reasons based on the score.  If a patient is on this medication at home then do not decrease Frequency below that used at home.    0-3 - enter or revise RT bronchodilator order(s) to equivalent RT Bronchodilator order with Frequency of every 4 hours PRN for wheezing or  increased work of breathing using Per Protocol order mode.        4-6 - enter or revise RT Bronchodilator order(s) to two equivalent RT bronchodilator orders with one order with BID Frequency and one order with Frequency of every 4 hours PRN wheezing or increased work of breathing using Per Protocol order mode.        7-10 - enter or revise RT Bronchodilator order(s) to two equivalent RT bronchodilator orders with one order with TID Frequency and one order with Frequency of every 4 hours PRN wheezing or increased work of breathing using Per Protocol order mode.       11-13 - enter or revise RT Bronchodilator order(s) to one equivalent RT bronchodilator order with QID Frequency and an Albuterol order with Frequency of every 4 hours PRN wheezing or increased work of breathing using Per Protocol order mode.      Greater than 13 - enter or revise RT Bronchodilator order(s) to one equivalent RT bronchodilator order with every 4 hours Frequency and an Albuterol order with Frequency of every 2 hours PRN wheezing or increased work of breathing using Per Protocol order mode.     RT to enter RT Home Evaluation for COPD & MDI Assessment order using Per Protocol order mode.    Electronically signed by Darrell Blanco RCP on 5/20/2025 at 7:46 PM

## 2025-05-20 NOTE — CONSULTS
Comprehensive Nutrition Assessment    RECOMMENDATIONS:  PO Diet: Regular per SLP recs  Oral Nutritional Supplement: Start Ensure Max BID  Bowels: Suppository ordered today, monitor need for increased regimen  Nutrition Education: Education/Counseling not appropriate     NUTRITION ASSESSMENT:   Nutritional summary & status: Follow up. Patient extubated yesterday and evaluated by SLP this morning. After MBS performed patient started on regular diet. Patient's last BM ~4 days ago with scheduled regimen since 5/13, discussed suppository today and possible SMOG enema if still no BM. RD will monitor diet tolerance and order ONS per patient preference.   Admission // PMH: COPD//acute respiratory failure, hyperkalemia, HTN, DM    MALNUTRITION ASSESSMENT  Context of Malnutrition: Acute Illness   Malnutrition Status: At risk for malnutrition  Findings of the 6 clinical characteristics of malnutrition (Minimum of 2 out of 6 clinical characteristics is required to make the diagnosis of moderate or severe Protein Calorie Malnutrition based on AND/ASPEN Guidelines):  Energy Intake:  No decrease in energy intake  Weight Loss:  No weight loss     Body Fat Loss:  No body fat loss     Muscle Mass Loss:  No muscle mass loss    Fluid Accumulation:  No fluid accumulation      NUTRITION DIAGNOSIS   Inadequate oral intake related to impaired respiratory function as evidenced by other (S/P intubation)    Nutrition Monitoring and Evaluation:   Food/Nutrient Intake Outcomes:  Food and Nutrient Intake, Supplement Intake, Diet Advancement/Tolerance  Physical Signs/Symptoms Outcomes:  Biochemical Data, Nutrition Focused Physical Findings, Weight, Hemodynamic Status     OBJECTIVE DATA: Significant to nutrition assessment  Nutrition Related Findings: Labs reviewed, LBM 5/16 (smear)  Wounds: None  Nutrition Goals: PO intake 50% or greater, within 2 days     CURRENT NUTRITION THERAPIES  ADULT DIET; Regular   PO Intake: Unable to assess (PO diet

## 2025-05-20 NOTE — MANAGEMENT PLAN
-[facility] Internal Medicine-  -ICU to [facility] Transfer Note-  HPI from H&P  Patient is a 65 year old female with a history of COPD on 3L O2 baseline, asthma, diabetes, hypertension, HLD who presented for worsening dyspnea. ICU consulted for closer monitoring of hypercarbic respiratory failure.     Patient initially presented to the ED on 5/13 with 2-3 days of worsening shortness of breath with cough. Her cough also was associated with yellow sputum. Had also been reporting fevers at home. Her SpO2 was in mid 70s at the time. She was started on continuous nebulizer tx in ED, given steroids and magnesium. Initial labs with pH 7.2 with pCO2 of 87.5. CXR with no evidence of pna. Troponin and BNP unremarkable. She was admitted to the PCU and started on BiPAP with duonebs, solumedrol 40 mg IV BID. Pulmonology following.     Since initiation of BiPAP, her respiratory symptoms have not significantly improved. Her pH and pCO2 initially began to improve, best was 7.27 / 74.3. However, on 5/15, began to once again worsen, pH 7.228.     Rapid response was called at approx 1330 on 5/15 for worsening somnolence. At that time, she was on AVAPS. She answers questions but is acutely confused, flailing extremities, more somnolent, which had not reportedly been the case prior. ABG was not acutely worse. However, given her tenuous respiratory status, she was transferred to ICU for closer monitoring. Upon transfer to the ICU, she became hypoxic, SpO2 60s, ultimately requiring intubation.    I  ICU Admission Reason & Brief ICU Course:   Patient presented for worsening respiratory status attributed to her COPD. Was found to have significant bilateral wheezes, worsening hypercarbia followed by acute hypoxia, requiring intubation. Underwent steroid and azithromycin course. She was given brovana, pulmicort, duonebs, and her respiratory status improved. She was extubated on 5/19. On day of transfer, she was on 6L NC (baseline O2

## 2025-05-20 NOTE — PROGRESS NOTES
Physical Therapy and Occupational Therapy  No Treatment    Orders received and chart reviewed.  Pt currently leaving floor for MBS.  Will follow up later today vs 5/21 as time and schedule allow.  RN aware.    Chantel Diaz, PT #[id number]     Meli Cornell OTR/L

## 2025-05-20 NOTE — CARE COORDINATION
CM following.     Patient is from a 2 family home with her daughter. She has home oxygen at 3L baseline (unsure of company). No other DME use. No current in home services.     Pt is not an active ; her daughter helps with all transportation needs.     Pt extubated on 5/19/25, currently on 5 L high flow O2. Plan for MBS, and therapy evals to help with discharge planning.     Fiona Lau RN, BSN, CM  Case Management Department  [phone]

## 2025-05-20 NOTE — PLAN OF CARE
Problem: Chronic Conditions and Co-morbidities  Goal: Patient's chronic conditions and co-morbidity symptoms are monitored and maintained or improved  5/20/2025 0924 by Stella Trejo RN  Outcome: Progressing  5/20/2025 0346 by Perlita Nunez RN  Outcome: Progressing  Flowsheets (Taken 5/19/2025 2000)  Care Plan - Patient's Chronic Conditions and Co-Morbidity Symptoms are Monitored and Maintained or Improved: Monitor and assess patient's chronic conditions and comorbid symptoms for stability, deterioration, or improvement     Problem: Discharge Planning  Goal: Discharge to home or other facility with appropriate resources  5/20/2025 0924 by Stella Trejo RN  Outcome: Progressing  5/20/2025 0346 by Perlita Nunez RN  Outcome: Progressing     Problem: Safety - Adult  Goal: Free from fall injury  5/20/2025 0924 by Stella Trejo RN  Outcome: Progressing  5/20/2025 0346 by Perlita Nunez RN  Outcome: Progressing     Problem: Pain  Goal: Verbalizes/displays adequate comfort level or baseline comfort level  5/20/2025 0924 by Stella Trejo RN  Outcome: Progressing  5/20/2025 0346 by Perlita Nunez RN  Outcome: Progressing     Problem: ABCDS Injury Assessment  Goal: Absence of physical injury  5/20/2025 0924 by Stella Trejo RN  Outcome: Progressing  5/20/2025 0346 by Perlita Nunez RN  Outcome: Progressing     Problem: Skin/Tissue Integrity  Goal: Skin integrity remains intact  Description: 1.  Monitor for areas of redness and/or skin breakdown2.  Assess vascular access sites hourly3.  Every 4-6 hours minimum:  Change oxygen saturation probe site4.  Every 4-6 hours:  If on nasal continuous positive airway pressure, respiratory therapy assess nares and determine need for appliance change or resting period  5/20/2025 0924 by Stella Trejo RN  Outcome: Progressing  5/20/2025 0346 by Perlita Nunez RN  Outcome: Progressing  Flowsheets (Taken 5/19/2025 2000)  Skin Integrity Remains Intact:

## 2025-05-20 NOTE — PROGRESS NOTES
Speech Language Pathology  Facility/Department:[facility] ICU  Bedside Swallow Evaluation                                                       Name: Susanne Jason  : 1960  MRN: [id number]    Patient Diagnosis(es):   Patient Active Problem List    Diagnosis Date Noted    Hyperkalemia 05/15/2025    Electrolyte imbalance 05/15/2025    Hypertension 05/15/2025    Acute on chronic respiratory failure with hypoxia and hypercapnia (HCC) 2025    COPD exacerbation (HCC) 2024    COPD (chronic obstructive pulmonary disease) (HCC) 2023    Acute respiratory failure with hypoxia and hypercapnia (HCC) 2023    COPD with acute exacerbation (HCC) 2023       Past Medical History:   Diagnosis Date    Asthma     COPD (chronic obstructive pulmonary disease) (HCC)     Diabetes mellitus (HCC)     GERD (gastroesophageal reflux disease)      Past Surgical History:   Procedure Laterality Date    COLONOSCOPY      LAPAROSCOPY      OTHER SURGICAL HISTORY  2015    PLANTAR FASCIOTOMY RIGHT FOOT; STEROID INJECTION LEFT HEEL    OTHER SURGICAL HISTORY      patient states surgery for  stomach ulcer    PLANTAR FASCIA SURGERY Left 3/30/16    ENDOSCOPIC PLANTAR FASCIOTOMY LEFT FOOT    WISDOM TOOTH EXTRACTION         Reason for Referral:  Susanne Jason  was referred for a Speech Therapy evaluation to assess swallow function.    History of Present Illness  Per physician notes:  '65 year old female with a history of COPD on 3L O2 baseline, asthma, diabetes, hypertension, HLD who presented for worsening dyspnea. ICU consulted for closer monitoring of hypercarbic respiratory failure.'  Intubated 5/15-    CXR: 5/15/25  ET tube in expected location.   No acute consolidation    Date of onset: 5/15/25    Current Diet:  Diet NPO  ADULT TUBE FEEDING VOLUME BASED; Orogastric; Peptide Based High Protein; 30; Continuous; 960; 24    Treatment Diagnosis: dysphagia    Pain:  None indicated by any

## 2025-05-20 NOTE — PROGRESS NOTES
Nephrology Progress Note                                                                                                                                                                                                                                                                                                                                                               Office : [phone]     Fax :[phone]    Patient's Name: Susanne Jason  10:01 AM  5/20/2025    Reason for Consult:  Hyperkalemia   Requesting Physician:  No primary care provider on file.  Chief Complaint:    Chief Complaint   Patient presents with    Respiratory Distress     Pt been having difficulty breathing since this morning, on 3 lpm oxygen at baseline but spo2 is just 75%        Assessment/Plan     # Hyperkalemia - Resolved  - S/p Lasix 20 mg x 1. S/p IVF  - On tube feeding (low K)  - Holding ARB  - Monitor     # Acute on chronic hypoxic respiratory failure  # COPD exacerbation  - Intubated on 5/15. S/p extubation on 5/19  - On abx and steroids   - Pulmonary on board    # Hypotension   - Off meds  - BP elevated now. Added CCB   - Monitor     # DM2  - Insulin management per primary       History of Present Ilness:    Susanne Jason is a 65 y.o. female with a PMH of COPD, chronic respiratory failure, HLD, HTN, and DM2, who presents with acute on chronic hypoxic respiratory failure secondary to COPD exacerbation. We are now being consulted for hyperkalemia.     Interval hx:    Extubated yesterday  BP was elevated - now better  Creatinine and electrolytes stable       Past Medical History:   Diagnosis Date    Asthma     COPD (chronic obstructive pulmonary disease) (HCC)     Diabetes mellitus (HCC)     GERD (gastroesophageal reflux disease)        Past Surgical History:   Procedure Laterality Date    COLONOSCOPY      LAPAROSCOPY      OTHER SURGICAL HISTORY  1/9/2015    PLANTAR FASCIOTOMY RIGHT FOOT; STEROID INJECTION LEFT HEEL     Daily  arformoterol tartrate (BROVANA) nebulizer solution 15 mcg, BID RT  hydrALAZINE (APRESOLINE) injection 10 mg, Q8H PRN  hydrOXYzine HCl (ATARAX) tablet 10 mg, TID PRN      Physical exam:     Vitals:  /72   Pulse (!) 108   Temp 98.5 °F (36.9 °C) (Temporal)   Resp 17   Ht 1.549 m (5' 1\")   Wt 75 kg (165 lb 5.5 oz)   LMP 01/09/2013   SpO2 93%   BMI 31.24 kg/m²   Constitutional: Alert, NAD   Skin: no rash, turgor wnl  Heent:  eomi, mmm  Neck: no bruits or jvd noted  Cardiovascular:  S1, S2 without m/r/g  Respiratory: CTA B without w/r/r  Abdomen:  soft, nt, nd  Ext:  lower extremity edema No  Psychiatric: mood and affect difficult to assess   Musculoskeletal:  Rom, muscular strength intact    Data:   Labs:  CBC:   Recent Labs     05/18/25  0533 05/19/25  0540 05/20/25  0551   WBC 9.7 11.4* 15.3*   HGB 10.7* 11.1* 12.5    216 285     BMP:    Recent Labs     05/18/25  0533 05/19/25  0540 05/20/25  0551    136 137   K 4.6 4.6 see below   CL 97* 96* 96*   CO2 33* 33* 25   BUN 28* 20 17   CREATININE 0.6 0.6 0.7   GLUCOSE 211* 226* 121*     Ca/Mg/Phos:   Recent Labs     05/18/25  0533 05/19/25  0540 05/20/25  0551   CALCIUM 9.2 9.6 10.0   MG 1.88 1.86 2.05   PHOS 2.7 3.7 3.6     Hepatic: No results for input(s): \"AST\", \"ALT\", \"BILITOT\", \"ALKPHOS\" in the last 72 hours.    Invalid input(s): \"ALB\"  Troponin: No results for input(s): \"TROPONINI\" in the last 72 hours.  BNP: No results for input(s): \"BNP\" in the last 72 hours.  Lipids: No results for input(s): \"CHOL\", \"TRIG\", \"HDL\" in the last 72 hours.    Invalid input(s): \"LDLCALC\", \"LABVLDL\"  ABGs:   Recent Labs     05/20/25  0835   PHART 7.398   PO2ART 95.6   [id number] 55.2*     INR: No results for input(s): \"INR\" in the last 72 hours.  UA:No results for input(s): \"COLORU\", \"CLARITYU\", \"GLUCOSEU\", \"BILIRUBINUR\", \"KETUA\", \"SPECGRAV\", \"BLOODU\", \"PHUR\", \"PROTEINU\", \"UROBILINOGEN\", \"NITRU\", \"LEUKOCYTESUR\", \"URINETYPE\" in the last 72 hours.    Invalid

## 2025-05-20 NOTE — PROGRESS NOTES
ICU PROGRESS      Hospital Day: 5  ICU Day: 4                                                        Code:Full Code  Admit Date: 5/13/2025  PCP: No primary care provider on file.                                  CC: Dyspnea    SUBJECTIVE:   Interval Events:  - No acute events overnight. Vital signs stable  - Has been hypertensive SBP 160s-170s  - Extubated on 5/20  - On exam, wheezes significantly improved. Pt mentating at known baseline, although refusing straight cath, pure wick  - Blood glucose intermittently elevated to 230s. Will continue HDSSI, expect to improve now that off steroids.  - Interval plan: Continue inhalers, supportive care. PT/OT/SLP. Started amlodipine and chlorthalidone for HTN    HPI:  Patient is a 65 year old female with a history of COPD on 3L O2 baseline, asthma, diabetes, hypertension, HLD who presented for worsening dyspnea. ICU consulted for closer monitoring of hypercarbic respiratory failure.    Patient initially presented to the ED on 5/13 with 2-3 days of worsening shortness of breath with cough. Her cough also was associated with yellow sputum. Had also been reporting fevers at home. Her SpO2 was in mid 70s at the time. She was started on continuous nebulizer tx in ED, given steroids and magnesium. Initial labs with pH 7.2 with pCO2 of 87.5. CXR with no evidence of pna. Troponin and BNP unremarkable. She was admitted to the PCU and started on BiPAP with duonebs, solumedrol 40 mg IV BID. Pulmonology following.    Since initiation of BiPAP, her respiratory symptoms have not significantly improved. Her pH and pCO2 initially began to improve, best was 7.27 / 74.3. However, on 5/15, began to once again worsen, pH 7.228.    Rapid response was called at approx 1330 on 5/15 for worsening somnolence. At that time, she was on AVAPS. She answers questions but is acutely confused, flailing extremities, more somnolent, which had not reportedly been the case prior. ABG was not acutely worse.

## 2025-05-20 NOTE — PROGRESS NOTES
pain.    Social/Functional History  Lives With: Daughter (24/7 assists)  Type of Home: House (2 family home)  Home Layout: One level  Home Access: Stairs to enter with rails  Entrance Stairs - Number of Steps: 2+3  Bathroom Shower/Tub: Tub/Shower unit  Bathroom Toilet: Standard  Bathroom Equipment: None  Home Equipment: Walker - Rolling  Prior Level of Assist for ADLs: Independent  Prior Level of Assist for Homemaking: Independent  Prior Level of Assist for Ambulation: Independent household ambulator, with or without device  Prior Level of Assist for Transfers: Independent  Active : No  Patient's  Info: daughters drive    Vision/Hearing  Vision: Within Functional Limits  Hearing: Within functional limits      Cognition   Orientation  Orientation Level: Oriented X4    Cognition  Overall Cognitive Status: WFL    Objective  Gross Assessment  AROM: Within functional limits  Strength: Within functional limits    Bed mobility  Supine to Sit: Stand by assistance    Transfers  Sit to Stand: Contact guard assistance (to rolling walker)  Stand to Sit: Contact guard assistance (cues for safety)  Bed to Chair: Contact guard assistance (stand step pivot)    Ambulation  Device: Rolling Walker  Other Apparatus: O2 (4L)  Assistance: Contact guard assistance  Gait Deviations: Slow Meche;Decreased step length;Decreased step height  Distance: 5-6 steps to bedside chair  Comments: MIld dyspnea noted.  SpO2 93% on 4L.    Balance  Sitting - Static: Good  Sitting - Dynamic: Good  Standing - Static: Fair  Standing - Dynamic: Fair (using rolling walker for ambulation/transfers)      AM-PAC - Mobility  AM-PAC Basic Mobility - Inpatient   How much help is needed turning from your back to your side while in a flat bed without using bedrails?: A Little  How much help is needed moving from lying on your back to sitting on the side of a flat bed without using bedrails?: A Little  How much help is needed moving to and from a bed to a  chair?: A Little  How much help is needed standing up from a chair using your arms?: A Little  How much help is needed walking in hospital room?: A Little  How much help is needed climbing 3-5 steps with a railing?: A Lot  AM-PAC Inpatient Mobility Raw Score : 17  AM-PAC Inpatient T-Scale Score : 42.13  Mobility Inpatient CMS 0-100% Score: 50.57  Mobility Inpatient CMS G-Code Modifier : CK      Goals  Short Term Goals  Time Frame for Short Term Goals: Discharge  Short Term Goal 1: Supine <> sit supervision  Short Term Goal 2: sit <> stand supervision  Short Term Goal 3: ambulate 150ft with rolling walker SBA  Short Term Goal 4: ambulate up/down 4 steps with rail SBA  Patient Goals   Patient Goals : To return home       Education  Patient Education  Education Given To: Patient  Education Provided: Role of Therapy;Plan of Care;Transfer Training;Equipment;Fall Prevention Strategies  Education Provided Comments: Activity promotion with RN staff  Education Method: Verbal  Education Outcome: Verbalized understanding      Therapy Time   Individual Concurrent Group Co-treatment   Time In 1130         Time Out 1153         Minutes 23         Timed Code Treatment Minutes:  10  Total Treatment Minutes:  23      Chantel Diaz, PT

## 2025-05-20 NOTE — PROGRESS NOTES
Occupational Therapy  Facility/Department: [facility] ICU  Occupational Therapy Initial Assessment/ Treatment     Name: Susanne Jason  : 1960  MRN: [id number]  Date of Service: 2025    Discharge Recommendations:  24 hour supervision or assist, Home with Home health OT  OT Equipment Recommendations  Equipment Needed: Yes  Mobility Devices: ADL Assistive Devices;Walker  ADL Assistive Devices: Transfer Tub Bench       Patient Diagnosis(es): The primary encounter diagnosis was COPD exacerbation ([facility]). A diagnosis of Acute on chronic respiratory failure with hypoxia and hypercapnia (HCC) was also pertinent to this visit.  Past Medical History:  has a past medical history of Asthma, COPD (chronic obstructive pulmonary disease) (HCC), Diabetes mellitus (HCC), and GERD (gastroesophageal reflux disease).  Past Surgical History:  has a past surgical history that includes Colonoscopy; laparoscopy; [place] tooth extraction; other surgical history (2015); other surgical history; and Plantar fascia surgery (Left, 3/30/16).           Assessment  Performance deficits / Impairments: Decreased functional mobility ;Decreased strength;Decreased endurance  Assessment: Pt with fair tolerance to therapy evaluation today. Per pt she was ind with ADLs/IADLs and was ambulating w/ RW prior to admission. Today, pt completed functional transfers/mobility CGA with use of RW. She was on 4L/min via NC with SPo2>90% throughout session (3L baseline). Pt seems to be functionning close to her baseline. Education on importance of OOB and continues activity during her hospital stay, pt receptive. Pt should continue to progress well. She has 24hr assist at home. She would benefit from cont inpt OT and home OT in order to progress pt closer to her baseline function and to maximize her functional independence.  Prognosis: Good  REQUIRES OT FOLLOW-UP: Yes  Activity Tolerance  Activity Tolerance: Patient Tolerated treatment well;Patient limited

## 2025-05-20 NOTE — PROCEDURES
INSTRUMENTAL SWALLOW REPORT  MODIFIED BARIUM SWALLOW  Speech Therapy Note    NAME: Susanne Jason     : 1960  MRN: [id number]       Date of Eval: 2025     Ordering Physician: Dr. Adrián Hernandez  Referring Diagnosis: Dysphagia    Past Medical History:  has a past medical history of Asthma, COPD (chronic obstructive pulmonary disease) (HCC), Diabetes mellitus (HCC), and GERD (gastroesophageal reflux disease).  Past Surgical History:  has a past surgical history that includes Colonoscopy; laparoscopy; [place] tooth extraction; other surgical history (2015); other surgical history; and Plantar fascia surgery (Left, 3/30/16).    CXR: 5/15/25  ET tube in expected location.   No acute consolidation    Prior MBS Results: 23  Oral Phase  Mild dysfunction characterized by prolonged mastication of regular solid. Appropriate oral acceptance and clearance, no significant stasis.  Pharyngeal Phase  Mild-moderate dysfunction characterized by impaired timing and strength, with delayed swallow onset, delayed and reduced hyolaryngeal elevation and epiglottic retraction. Resulted in deep penetration and tracheal aspiration of thin liquids; reactive cough present, but unable to clear. Chin tuck initially appeared to prevent, but aspiration occurred on second trial. Improved timing and bolus control noted for mildly thick liquid, with single instance of shallow flash penetration, and no aspiration. No significant residual stasis.  Upper Esophageal Screen  Indirectly assessed; reflux/regurgitation of liquid to hypopharynx observed x 1 when pt coughing. Of note, pt with prior hx GERD.  Recommended diet:   Mildly Thick Liquids (nectar) and Soft & Bite-Sized Solids    Patient Complaints/Reason for Referral:  Susanne Jason was referred for a MBS to assess for aspiration, determine swallow safety and efficiency, and to make recommendations regarding safe dietary consistencies, effective compensatory

## 2025-05-21 LAB
ALBUMIN SERPL-MCNC: 3.8 G/DL (ref 3.4–5)
ANION GAP SERPL CALCULATED.3IONS-SCNC: 11 MMOL/L (ref 3–16)
ANISOCYTOSIS BLD QL SMEAR: ABNORMAL
BACTERIA URNS QL MICRO: ABNORMAL /HPF
BASOPHILS # BLD: 0 K/UL (ref 0–0.2)
BASOPHILS NFR BLD: 0 %
BILIRUB UR QL STRIP.AUTO: NEGATIVE
BUN SERPL-MCNC: 23 MG/DL (ref 7–20)
CALCIUM SERPL-MCNC: 9.5 MG/DL (ref 8.3–10.6)
CHLORIDE SERPL-SCNC: 96 MMOL/L (ref 99–110)
CLARITY UR: CLEAR
CO2 SERPL-SCNC: 31 MMOL/L (ref 21–32)
COLOR UR: YELLOW
CREAT SERPL-MCNC: 0.7 MG/DL (ref 0.6–1.2)
DEPRECATED RDW RBC AUTO: 17.8 % (ref 12.4–15.4)
EOSINOPHIL # BLD: 0 K/UL (ref 0–0.6)
EOSINOPHIL NFR BLD: 0 %
EPI CELLS #/AREA URNS HPF: ABNORMAL /HPF (ref 0–5)
GFR SERPLBLD CREATININE-BSD FMLA CKD-EPI: >90 ML/MIN/{1.73_M2}
GLUCOSE BLD-MCNC: 101 MG/DL (ref 70–99)
GLUCOSE BLD-MCNC: 164 MG/DL (ref 70–99)
GLUCOSE BLD-MCNC: 189 MG/DL (ref 70–99)
GLUCOSE BLD-MCNC: 206 MG/DL (ref 70–99)
GLUCOSE BLD-MCNC: 208 MG/DL (ref 70–99)
GLUCOSE SERPL-MCNC: 136 MG/DL (ref 70–99)
GLUCOSE UR STRIP.AUTO-MCNC: NEGATIVE MG/DL
HCT VFR BLD AUTO: 34.8 % (ref 36–48)
HGB BLD-MCNC: 11.4 G/DL (ref 12–16)
HGB UR QL STRIP.AUTO: NEGATIVE
KETONES UR STRIP.AUTO-MCNC: 15 MG/DL
LEUKOCYTE ESTERASE UR QL STRIP.AUTO: ABNORMAL
LYMPHOCYTES # BLD: 2.8 K/UL (ref 1–5.1)
LYMPHOCYTES NFR BLD: 23 %
MAGNESIUM SERPL-MCNC: 1.99 MG/DL (ref 1.8–2.4)
MCH RBC QN AUTO: 23.8 PG (ref 26–34)
MCHC RBC AUTO-ENTMCNC: 32.8 G/DL (ref 31–36)
MCV RBC AUTO: 72.6 FL (ref 80–100)
MICROCYTES BLD QL SMEAR: ABNORMAL
MONOCYTES # BLD: 1.5 K/UL (ref 0–1.3)
MONOCYTES NFR BLD: 13 %
NEUTROPHILS # BLD: 7 K/UL (ref 1.7–7.7)
NEUTROPHILS NFR BLD: 62 %
NITRITE UR QL STRIP.AUTO: POSITIVE
OVALOCYTES BLD QL SMEAR: ABNORMAL
PERFORMED ON: ABNORMAL
PH UR STRIP.AUTO: 6 [PH] (ref 5–8)
PHOSPHATE SERPL-MCNC: 3.7 MG/DL (ref 2.5–4.9)
PLATELET # BLD AUTO: 300 K/UL (ref 135–450)
PMV BLD AUTO: 8 FL (ref 5–10.5)
POTASSIUM SERPL-SCNC: 4.1 MMOL/L (ref 3.5–5.1)
PROT UR STRIP.AUTO-MCNC: NEGATIVE MG/DL
RBC # BLD AUTO: 4.8 M/UL (ref 4–5.2)
RBC #/AREA URNS HPF: ABNORMAL /HPF (ref 0–4)
RENAL EPI CELLS #/AREA UR COMP ASSIST: ABNORMAL /HPF (ref 0–1)
SCHISTOCYTES BLD QL SMEAR: ABNORMAL
SODIUM SERPL-SCNC: 138 MMOL/L (ref 136–145)
SP GR UR STRIP.AUTO: 1.02 (ref 1–1.03)
STOMATOCYTES BLD QL SMEAR: ABNORMAL
TRIGL SERPL-MCNC: 241 MG/DL (ref 0–150)
UA COMPLETE W REFLEX CULTURE PNL UR: YES
UA DIPSTICK W REFLEX MICRO PNL UR: YES
URN SPEC COLLECT METH UR: ABNORMAL
UROBILINOGEN UR STRIP-ACNC: 1 E.U./DL
VARIANT LYMPHS NFR BLD MANUAL: 2 % (ref 0–6)
WBC # BLD AUTO: 11.3 K/UL (ref 4–11)
WBC #/AREA URNS HPF: >100 /HPF (ref 0–5)

## 2025-05-21 PROCEDURE — 6360000002 HC RX W HCPCS

## 2025-05-21 PROCEDURE — 97535 SELF CARE MNGMENT TRAINING: CPT

## 2025-05-21 PROCEDURE — 6370000000 HC RX 637 (ALT 250 FOR IP)

## 2025-05-21 PROCEDURE — 6360000002 HC RX W HCPCS: Performed by: INTERNAL MEDICINE

## 2025-05-21 PROCEDURE — 51798 US URINE CAPACITY MEASURE: CPT

## 2025-05-21 PROCEDURE — 36415 COLL VENOUS BLD VENIPUNCTURE: CPT

## 2025-05-21 PROCEDURE — 87086 URINE CULTURE/COLONY COUNT: CPT

## 2025-05-21 PROCEDURE — 80069 RENAL FUNCTION PANEL: CPT

## 2025-05-21 PROCEDURE — 92526 ORAL FUNCTION THERAPY: CPT

## 2025-05-21 PROCEDURE — 2700000000 HC OXYGEN THERAPY PER DAY

## 2025-05-21 PROCEDURE — 94761 N-INVAS EAR/PLS OXIMETRY MLT: CPT

## 2025-05-21 PROCEDURE — 2500000003 HC RX 250 WO HCPCS: Performed by: INTERNAL MEDICINE

## 2025-05-21 PROCEDURE — 2060000000 HC ICU INTERMEDIATE R&B

## 2025-05-21 PROCEDURE — 81001 URINALYSIS AUTO W/SCOPE: CPT

## 2025-05-21 PROCEDURE — 83735 ASSAY OF MAGNESIUM: CPT

## 2025-05-21 PROCEDURE — 97530 THERAPEUTIC ACTIVITIES: CPT

## 2025-05-21 PROCEDURE — 2500000003 HC RX 250 WO HCPCS

## 2025-05-21 PROCEDURE — 99232 SBSQ HOSP IP/OBS MODERATE 35: CPT | Performed by: INTERNAL MEDICINE

## 2025-05-21 PROCEDURE — 94640 AIRWAY INHALATION TREATMENT: CPT

## 2025-05-21 PROCEDURE — 85025 COMPLETE CBC W/AUTO DIFF WBC: CPT

## 2025-05-21 RX ORDER — CHLORTHALIDONE 25 MG/1
25 TABLET ORAL DAILY
Qty: 30 TABLET | Refills: 3 | Status: SHIPPED | OUTPATIENT
Start: 2025-05-22 | End: 2025-05-22

## 2025-05-21 RX ORDER — AMLODIPINE BESYLATE 5 MG/1
5 TABLET ORAL NIGHTLY
Qty: 30 TABLET | Refills: 3 | Status: SHIPPED | OUTPATIENT
Start: 2025-05-21 | End: 2025-05-22

## 2025-05-21 RX ADMIN — ROSUVASTATIN CALCIUM 20 MG: 20 TABLET, FILM COATED ORAL at 08:06

## 2025-05-21 RX ADMIN — ARFORMOTEROL TARTRATE 15 MCG: 15 SOLUTION RESPIRATORY (INHALATION) at 08:44

## 2025-05-21 RX ADMIN — INSULIN LISPRO 4 UNITS: 100 INJECTION, SOLUTION INTRAVENOUS; SUBCUTANEOUS at 20:00

## 2025-05-21 RX ADMIN — WATER 1000 MG: 1 INJECTION INTRAMUSCULAR; INTRAVENOUS; SUBCUTANEOUS at 15:26

## 2025-05-21 RX ADMIN — INSULIN LISPRO 4 UNITS: 100 INJECTION, SOLUTION INTRAVENOUS; SUBCUTANEOUS at 02:52

## 2025-05-21 RX ADMIN — ACETAMINOPHEN 650 MG: 325 TABLET ORAL at 09:45

## 2025-05-21 RX ADMIN — SODIUM CHLORIDE, PRESERVATIVE FREE 10 ML: 5 INJECTION INTRAVENOUS at 08:07

## 2025-05-21 RX ADMIN — CHLORTHALIDONE 25 MG: 25 TABLET ORAL at 08:05

## 2025-05-21 RX ADMIN — IPRATROPIUM BROMIDE AND ALBUTEROL SULFATE 1 DOSE: .5; 3 SOLUTION RESPIRATORY (INHALATION) at 08:44

## 2025-05-21 RX ADMIN — AMLODIPINE BESYLATE 5 MG: 5 TABLET ORAL at 20:55

## 2025-05-21 RX ADMIN — INSULIN LISPRO 4 UNITS: 100 INJECTION, SOLUTION INTRAVENOUS; SUBCUTANEOUS at 12:33

## 2025-05-21 RX ADMIN — Medication 5 MG: at 23:45

## 2025-05-21 RX ADMIN — BUDESONIDE 500 MCG: 0.5 SUSPENSION RESPIRATORY (INHALATION) at 08:44

## 2025-05-21 RX ADMIN — ENOXAPARIN SODIUM 40 MG: 100 INJECTION SUBCUTANEOUS at 08:06

## 2025-05-21 ASSESSMENT — PAIN DESCRIPTION - LOCATION: LOCATION: BACK

## 2025-05-21 ASSESSMENT — PAIN SCALES - GENERAL: PAINLEVEL_OUTOF10: 10

## 2025-05-21 ASSESSMENT — PAIN DESCRIPTION - DESCRIPTORS: DESCRIPTORS: ACHING;DISCOMFORT

## 2025-05-21 ASSESSMENT — PAIN DESCRIPTION - ORIENTATION: ORIENTATION: LOWER

## 2025-05-21 NOTE — DISCHARGE INSTR - COC
Continuity of Care Form    Patient Name: Susanne Jason   :  1960  MRN:  [id number]    Admit date:  2025  Discharge date:  25    Code Status Order: Full Code   Advance Directives:     Admitting Physician:  Chris Lion MD  PCP: No primary care provider on file.    Discharging Nurse: Rober WESTON RN  Discharging Hospital Unit/Room#: 4516/4516-01  Discharging Unit Phone Number: [phone]    Emergency Contact:   Extended Emergency Contact Information  Primary Emergency Contact: Lisbteh Jason  Mobile Phone: [phone]  Relation: Child  Preferred language: English  Secondary Emergency Contact: Abebe Mills  [place] Phone: [phone]  Mobile Phone: [phone]  Relation: Other    Past Surgical History:  Past Surgical History:   Procedure Laterality Date    COLONOSCOPY      LAPAROSCOPY      OTHER SURGICAL HISTORY  2015    PLANTAR FASCIOTOMY RIGHT FOOT; STEROID INJECTION LEFT HEEL    OTHER SURGICAL HISTORY      patient states surgery for  stomach ulcer    PLANTAR FASCIA SURGERY Left 3/30/16    ENDOSCOPIC PLANTAR FASCIOTOMY LEFT FOOT    WISDOM TOOTH EXTRACTION         Immunization History:     There is no immunization history on file for this patient.    Active Problems:  Patient Active Problem List   Diagnosis Code    COPD with acute exacerbation ([facility]) J44.1    COPD (chronic obstructive pulmonary disease) ([facility]) J44.9    Acute respiratory failure with hypoxia and hypercapnia ([facility]) J96.01, J96.02    COPD exacerbation ([facility]) J44.1    Acute on chronic respiratory failure with hypoxia and hypercapnia ([facility]) J96.21, J96.22    Hyperkalemia E87.5    Electrolyte imbalance E87.8    Hypertension I10       Isolation/Infection:   Isolation            No Isolation          Patient Infection Status    No active infections.   Resolved       Infection Onset Added Last Indicated Last Indicated By Resolved Resolved By    COVID-19 24 COVID-19 & Influenza Combo 24 Infection

## 2025-05-21 NOTE — PROGRESS NOTES
Palliative Care Chart Review  and Check in Note:     NAME:  Susanne Jason  Admit Date: 5/13/2025  Hospital Day:  Hospital Day: 9   Current Code status: Full Code    Palliative care is continuing to following Ms. Jason for symptom management,  and goals of care discussion as needed. Patient's chart reviewed today 5/21/25.      Saw pt at the bedside, she is resting comfortably on 4LNC and is alert and oriented x4. We discussed her medical conditions and admission course thus far. She is grateful that she was able to be extubated. She expressed her breathing is much better. We discussed the possibility that she may go into respiratory distress in the future and may face a trach at some point. We discussed tracheostomies in depth. Pt is truly unsure whether she would want a trach. She is going to discuss with her partner/HCPOA Abebe. She understands that if she got to that point it would be Abebe making that decision and he needs to know her wishes. Provided emotional support.       The following are the currently established goals/code status, and Symptom management.     Goals of care: We completed a HCPOA 5/14 and she named her long term partner Abebe Hatch as the primary agent, daughter Lisbeth as the first alternate agent and daughter Dora as the second alternate agent.  Pt wants to avoid intubation but is OK with it if necessary.  Her goals are life prolonging. We discussed possible need for trach in the future if her breathing declines again. Pt reported she needs to think about this and will discuss with Abebe.     Code status: Full code, discussed with pt in depth 5/14.     Discharge plan: TBD pending hospital course       HENRIQUE Singh - CNP  05/21/25  12:17 PM

## 2025-05-21 NOTE — PLAN OF CARE
Problem: Chronic Conditions and Co-morbidities  Goal: Patient's chronic conditions and co-morbidity symptoms are monitored and maintained or improved  Outcome: Progressing     Problem: Discharge Planning  Goal: Discharge to home or other facility with appropriate resources  Outcome: Progressing     Problem: Safety - Adult  Goal: Free from fall injury  Outcome: Progressing     Problem: Pain  Goal: Verbalizes/displays adequate comfort level or baseline comfort level  Outcome: Progressing     Problem: ABCDS Injury Assessment  Goal: Absence of physical injury  Outcome: Progressing     Problem: Skin/Tissue Integrity  Goal: Skin integrity remains intact  Description: 1.  Monitor for areas of redness and/or skin breakdown2.  Assess vascular access sites hourly3.  Every 4-6 hours minimum:  Change oxygen saturation probe site4.  Every 4-6 hours:  If on nasal continuous positive airway pressure, respiratory therapy assess nares and determine need for appliance change or resting period  Outcome: Progressing     Problem: Nutrition Deficit:  Goal: Optimize nutritional status  Outcome: Progressing     Problem: Safety - Medical Restraint  Goal: Remains free of injury from restraints (Restraint for Interference with Medical Device)  Description: INTERVENTIONS:1. Determine that other, less restrictive measures have been tried or would not be effective before applying the restraint2. Evaluate the patient's condition at the time of restraint application3. Inform patient/family regarding the reason for restraint4. Q2H: Monitor safety, psychosocial status, comfort, nutrition and hydration  Outcome: Progressing

## 2025-05-21 NOTE — PLAN OF CARE
Problem: Chronic Conditions and Co-morbidities  Goal: Patient's chronic conditions and co-morbidity symptoms are monitored and maintained or improved  5/21/2025 1119 by Stella Trejo RN  Outcome: Progressing  5/21/2025 0158 by Sotero Krishna RN  Outcome: Progressing     Problem: Discharge Planning  Goal: Discharge to home or other facility with appropriate resources  5/21/2025 1119 by Stella Trejo RN  Outcome: Progressing  5/21/2025 0158 by Sotero Krishna RN  Outcome: Progressing     Problem: Safety - Adult  Goal: Free from fall injury  5/21/2025 1119 by Stella Trejo RN  Outcome: Progressing  5/21/2025 0158 by Sotero Krishna RN  Outcome: Progressing     Problem: Pain  Goal: Verbalizes/displays adequate comfort level or baseline comfort level  5/21/2025 1119 by Stella Trejo RN  Outcome: Progressing  5/21/2025 0158 by Sotero Krishna RN  Outcome: Progressing     Problem: ABCDS Injury Assessment  Goal: Absence of physical injury  5/21/2025 1119 by Stella Trejo RN  Outcome: Progressing  5/21/2025 0158 by Sotero Krishna RN  Outcome: Progressing     Problem: Skin/Tissue Integrity  Goal: Skin integrity remains intact  Description: 1.  Monitor for areas of redness and/or skin breakdown2.  Assess vascular access sites hourly3.  Every 4-6 hours minimum:  Change oxygen saturation probe site4.  Every 4-6 hours:  If on nasal continuous positive airway pressure, respiratory therapy assess nares and determine need for appliance change or resting period  5/21/2025 1119 by Stella Trejo RN  Outcome: Progressing  5/21/2025 0158 by Sotero Krishna RN  Outcome: Progressing     Problem: Nutrition Deficit:  Goal: Optimize nutritional status  5/21/2025 1119 by Stella Trejo RN  Outcome: Progressing  5/21/2025 0158 by Sotero Krishna RN  Outcome: Progressing     Problem: Safety - Medical Restraint  Goal: Remains free of injury from restraints (Restraint for Interference with Medical

## 2025-05-21 NOTE — PROGRESS NOTES
Spiritual Health History and Assessment/Progress Note  [facility]    Spiritual/Emotional Needs, Rapid Response,  ,  Conversation with patient about the importance of allie in her life. Patient given Daily Bread reading per her request.     Name: Ssuanne Jason MRN: [id number]    Age: 65 y.o.     Sex: female   Language: English   Buddhist: Uatsdin   COPD (chronic obstructive pulmonary disease) ([facility])     Date: 5/21/2025            Total Time Calculated: 22 min              Spiritual Assessment continued in [facility] ICU        Referral/Consult From: Palliative Care   Encounter Overview/Reason: Spiritual/Emotional Needs  Service Provided For: Patient    Allie, Belief, Meaning:   Patient is connected with a allie tradition or spiritual practice and has beliefs or practices that help with coping during difficult times  Family/Friends No family/friends present      Importance and Influence:  Patient has spiritual/personal beliefs that influence decisions regarding their health  Family/Friends No family/friends present    Community:  Patient feels well-supported. Support system includes: Children  Family/Friends No family/friends present    Assessment and Plan of Care:     Patient Interventions include: Facilitated expression of thoughts and feelings, Explored spiritual coping/struggle/distress, and Affirmed coping skills/support systems  Family/Friends Interventions include: No family/friends present    Patient Plan of Care: No spiritual needs identified for follow-up and Spiritual Care available upon further referral  Family/Friends Plan of Care: No family/friends present    Electronically signed by DELMER Pavon on 5/21/2025 at 2:30 PM

## 2025-05-21 NOTE — PROGRESS NOTES
Speech Language Pathology  Facility/Department:[facility] ICU  Dysphagia treatment note/dc                                                       Name: Susanne Jason  : 1960  MRN: [id number]    Patient Diagnosis(es):   Patient Active Problem List    Diagnosis Date Noted    Hyperkalemia 05/15/2025    Electrolyte imbalance 05/15/2025    Hypertension 05/15/2025    Acute on chronic respiratory failure with hypoxia and hypercapnia (HCC) 2025    COPD exacerbation (HCC) 2024    COPD (chronic obstructive pulmonary disease) (HCC) 2023    Acute respiratory failure with hypoxia and hypercapnia (HCC) 2023    COPD with acute exacerbation (HCC) 2023     Past Medical History:   Diagnosis Date    Asthma     COPD (chronic obstructive pulmonary disease) (HCC)     Diabetes mellitus (HCC)     GERD (gastroesophageal reflux disease)      Past Surgical History:   Procedure Laterality Date    COLONOSCOPY      LAPAROSCOPY      OTHER SURGICAL HISTORY  2015    PLANTAR FASCIOTOMY RIGHT FOOT; STEROID INJECTION LEFT HEEL    OTHER SURGICAL HISTORY      patient states surgery for  stomach ulcer    PLANTAR FASCIA SURGERY Left 3/30/16    ENDOSCOPIC PLANTAR FASCIOTOMY LEFT FOOT    WISDOM TOOTH EXTRACTION       History of Present Illness  Per physician notes:  '65 year old female with a history of COPD on 3L O2 baseline, asthma, diabetes, hypertension, HLD who presented for worsening dyspnea. ICU consulted for closer monitoring of hypercarbic respiratory failure.'  Intubated 5/15-    CXR: 5/15/25  ET tube in expected location.   No acute consolidation    Date of onset: 5/15/25    Current Diet:  ADULT ORAL NUTRITION SUPPLEMENT; Breakfast, Dinner; Low Calorie/High Protein Oral Supplement  ADULT DIET; Regular; Low Potassium (Less than 3000 mg/day)    Treatment Diagnosis: dysphagia    Pain:  None indicated by any means    General:  Chart reviewed: Yes  Behavior/Cognition: awake, alert, cooperative with

## 2025-05-21 NOTE — PROGRESS NOTES
Nephrology Progress Note                                                                                                                                                                                                                                                                                                                                                               Office : [phone]     Fax :[phone]    Patient's Name: Susanne Jason    Reason for Consult:  Hyperkalemia   Requesting Physician:  No primary care provider on file.  Chief Complaint:    Chief Complaint   Patient presents with    Respiratory Distress     Pt been having difficulty breathing since this morning, on 3 lpm oxygen at baseline but spo2 is just 75%        Assessment/Plan     # Hyperkalemia - Resolved  - On tube feeding (low K)  - Holding ARB  - Monitor     # Acute on chronic hypoxic respiratory failure  # COPD exacerbation  - Intubated on 5/15  - On abx and steroids   - Pulmonary on board    # Hypotension   - Off meds  - BP better   - on CCB   - Monitor     # DM2  - Insulin management per primary       History of Present Ilness:    Susanne Jason is a 65 y.o. female with a PMH of COPD, chronic respiratory failure, HLD, HTN, and DM2, who presents with acute on chronic hypoxic respiratory failure secondary to COPD exacerbation. We are now being consulted for hyperkalemia.     Interval hx:      BP controlled   Electrolytes stable  Good UOP      Past Medical History:   Diagnosis Date    Asthma     COPD (chronic obstructive pulmonary disease) (HCC)     Diabetes mellitus (HCC)     GERD (gastroesophageal reflux disease)        Past Surgical History:   Procedure Laterality Date    COLONOSCOPY      LAPAROSCOPY      OTHER SURGICAL HISTORY  1/9/2015    PLANTAR FASCIOTOMY RIGHT FOOT; STEROID INJECTION LEFT HEEL    OTHER SURGICAL HISTORY      patient states surgery for  stomach ulcer    PLANTAR FASCIA SURGERY Left 3/30/16    ENDOSCOPIC

## 2025-05-21 NOTE — PROGRESS NOTES
Physical Therapy and Occupational Therapy  No Treatment    Spoke with RN who reports pt just returned back to bed after being up most of the morning.  Will check back later today as time/schedule allows.  If not, will follow up per therapy's plans of care.  RN in agreement.    Chantel Diaz, PT #[id number]   Priynaka Aj OTR/L #[id number]

## 2025-05-21 NOTE — PROGRESS NOTES
Occupational Therapy  Facility/Department: [facility] ICU  Occupational Therapy Progress Note    Name: Susanne Jason  : 1960  MRN: [id number]  Date of Service: 2025    Discharge Recommendations:  24 hour supervision or assist, Home with Home health OT  OT Equipment Recommendations  ADL Assistive Devices: Transfer Tub Bench       Patient Diagnosis(es): The primary encounter diagnosis was COPD exacerbation (HCC). A diagnosis of Acute on chronic respiratory failure with hypoxia and hypercapnia (HCC) was also pertinent to this visit.      Treatment Diagnosis: impaired ADLs/transfers and decreased functional activity tolerance      Assessment  Performance deficits / Impairments: Decreased functional mobility ;Decreased strength;Decreased endurance  Assessment: Fair tolerance to therapy. Agreeable for bed > BSC > chair transfers (CGA). Pt then donned brief w/ Keisha (assist to thread R foot). Declined further activity (as encouarged to ambulate to bathroom w/ assist). Pt seems to be functioning close to her baseline - has SOB at baseline and wears 3L at home. Reinforced need to be OOB and progress mobility - pt receptive, but may benefit from encouragement. Recommend 24 hr A at home - rec HHOT. Continue per POC and progress as tolerated.  Treatment Diagnosis: impaired ADLs/transfers and decreased functional activity tolerance  Prognosis: Good  REQUIRES OT FOLLOW-UP: Yes  Activity Tolerance  Activity Tolerance: Patient limited by fatigue  Activity Tolerance Comments: limited due to SOB. VSS pt on 4L via NC. HR did jump to 123 briefly during activity.     Plan  Occupational Therapy Plan  Times Per Week: 2-5  Times Per Day: Once a day  Current Treatment Recommendations: Strengthening, ROM, Balance training, Functional mobility training, Endurance training, Patient/Caregiver education & training, Safety education & training, Self-Care / ADL, Gait training    Restrictions  Position Activity Restriction  Other  1505         Time Out 1528         Minutes 23          Timed Code Treatment Minutes:   23    Total Treatment Minutes:  23         Priyanka Aj OTR/L #[id number]

## 2025-05-21 NOTE — PROGRESS NOTES
Nephrology Progress Note                                                                                                                                                                                                                                                                                                                                                               Office : [phone]     Fax :[phone]    Patient's Name: Susanne Jason  9:43 AM  5/21/2025    Reason for Consult:  Hyperkalemia   Requesting Physician:  No primary care provider on file.  Chief Complaint:    Chief Complaint   Patient presents with    Respiratory Distress     Pt been having difficulty breathing since this morning, on 3 lpm oxygen at baseline but spo2 is just 75%        Assessment/Plan     # Hyperkalemia - Resolved  - S/p Lasix 20 mg x 1. S/p IVF  - Back on a regular diet   - Holding ARB  - Monitor     # Acute on chronic hypoxic respiratory failure  # COPD exacerbation  - Intubated on 5/15. S/p extubation on 5/19  - On abx and steroids   - Pulmonary on board    # Hypotension   - Off meds  - BP elevated now. Added CCB   - Monitor     # DM2  - Insulin management per primary       History of Present Ilness:    Susanne Jason is a 65 y.o. female with a PMH of COPD, chronic respiratory failure, HLD, HTN, and DM2, who presents with acute on chronic hypoxic respiratory failure secondary to COPD exacerbation. We are now being consulted for hyperkalemia.     Interval hx:    Feeling OK   Potassium in good range  Creatinine stable  BP's improved  On 4L supplemental O2      Past Medical History:   Diagnosis Date    Asthma     COPD (chronic obstructive pulmonary disease) (HCC)     Diabetes mellitus (HCC)     GERD (gastroesophageal reflux disease)        Past Surgical History:   Procedure Laterality Date    COLONOSCOPY      LAPAROSCOPY      OTHER SURGICAL HISTORY  1/9/2015    PLANTAR FASCIOTOMY RIGHT FOOT; STEROID INJECTION LEFT  (BROVANA) nebulizer solution 15 mcg, BID RT  hydrALAZINE (APRESOLINE) injection 10 mg, Q8H PRN  hydrOXYzine HCl (ATARAX) tablet 10 mg, TID PRN      Physical exam:     Vitals:  BP (!) 143/81   Pulse 83   Temp 97.9 °F (36.6 °C) (Oral)   Resp 20   Ht 1.549 m (5' 1\")   Wt 75.8 kg (167 lb 1.7 oz)   LMP 01/09/2013   SpO2 99%   BMI 31.57 kg/m²   Constitutional: Alert and oriented, NAD   Skin: no rash, turgor wnl  Heent:  eomi, mmm  Neck: no bruits or jvd noted  Cardiovascular:  S1, S2 without m/r/g  Respiratory: Wheezing. + supplemental O2  Abdomen:  soft, nt, nd  Ext:  lower extremity edema No  Psychiatric: mood and affect difficult to assess   Musculoskeletal:  Rom, muscular strength intact    Data:   Labs:  CBC:   Recent Labs     05/19/25  0540 05/20/25  0551 05/21/25  0451   WBC 11.4* 15.3* 11.3*   HGB 11.1* 12.5 11.4*    285 300     BMP:    Recent Labs     05/19/25  0540 05/20/25  0551 05/21/25  0451    137 138   K 4.6 see below 4.1   CL 96* 96* 96*   CO2 33* 25 31   BUN 20 17 23*   CREATININE 0.6 0.7 0.7   GLUCOSE 226* 121* 136*     Ca/Mg/Phos:   Recent Labs     05/19/25  0540 05/20/25  0551 05/21/25  0451   CALCIUM 9.6 10.0 9.5   MG 1.86 2.05 1.99   PHOS 3.7 3.6 3.7     Hepatic: No results for input(s): \"AST\", \"ALT\", \"BILITOT\", \"ALKPHOS\" in the last 72 hours.    Invalid input(s): \"ALB\"  Troponin: No results for input(s): \"TROPONINI\" in the last 72 hours.  BNP: No results for input(s): \"BNP\" in the last 72 hours.  Lipids: No results for input(s): \"CHOL\", \"TRIG\", \"HDL\" in the last 72 hours.    Invalid input(s): \"LDLCALC\", \"LABVLDL\"  ABGs:   Recent Labs     05/20/25  0835   PHART 7.398   PO2ART 95.6   EKE0HQB 55.2*     INR: No results for input(s): \"INR\" in the last 72 hours.  UA:No results for input(s): \"COLORU\", \"CLARITYU\", \"GLUCOSEU\", \"BILIRUBINUR\", \"KETUA\", \"SPECGRAV\", \"BLOODU\", \"PHUR\", \"PROTEINU\", \"UROBILINOGEN\", \"NITRU\", \"LEUKOCYTESUR\", \"URINETYPE\" in the last 72 hours.    Invalid input(s):

## 2025-05-21 NOTE — PROGRESS NOTES
Pt complaining of pain with urination and a foul odor to her urine. She is also complaining of a new onset of sore throat. Dr. Hernandez messaged.     1600: UA sent. Pt started on antibiotics. Pt bladder scanned to assess for retention after urination. Bladder scan showed 0 mLs.

## 2025-05-21 NOTE — CARE COORDINATION
CM following.     Patient is from a 2 family home with her daughters.    Pt has home oxygen 3L at  baseline through [employer].   Pt has rolling walker.     PT/OT recommend home HHC at discharge.     Fiona Lau RN, BSN, CM  Case Management Department  [phone]

## 2025-05-21 NOTE — PROGRESS NOTES
V2.0    [facility] [place] [facility] Progress Note      Name:  Susanne Jason /Age/Sex: 1960  (65 y.o. female)   MRN & CSN:  [id number] & [id number] Encounter Date/Time: 2025 8:17 AM EDT   Location:  [facility]/4516-01 PCP: No primary care provider on file.     Attending:Adrián Hernandez MD       Hospital Day: 9    HPI:    Assessment and Recommendations     Hospital course:    Susanne Jason is a 65 y.o. female with pmh of COPD, chronic respiratory failure, hyperlipidemia, hypertension, diabetes who presents with COPD (chronic obstructive pulmonary disease) (HCC) exacerbation with acute on chronic hypoxic respiratory failure with acute on chronic hypercarbic respiratory failure      Acute on chronic hypoxic respiratory failure with acute hypercarbic respiratory failure: Present on admission  - Patient hypoxic in the ED needing 15 L of oxygen to maintain sats greater than 97%.  On presentation patient had a pH of 7.2, with CO2 of 80 however she progressively got worse her pH dropped to 7.09 with CO2 greater than 98 and needed to be on BiPAP.   - On 3 L oxygen chronically, for asthma/COPD  - Placed on BiPAP on admission and then high flow nasal cannula; back on bipap which reportedly was kept on most of the night after periods of patient refusal to wear: Appears this has been noted in previous hospitalizations  - Required tx to ICU and subsequent intubation Hosp Day 1: 25  - Bronchoscopy performed on 5/15/25: Minimal tracheal secretions, severe tracheomalacia with 90% narrowing during coughin  - Managed for COPD exacerbation  - Liberated from the vent 2025  - SLP consulted: diet resumed  - PT/OT: Home with HHC/supervision    COPD exacerbation  - Chronically on 3 L oxygen  - Was treated with DuoNebs and steroids; azithromycin; management for asthma/COPD exacerbation  - Improved now, appears near baseline  - Weaned to baseline oxygen     Severe COPD/severe persistent asthma, current smoker   She follows with  Pulmonary Eun Donovan at [facility] for her severe COPD/asthma. - Per notes from 3/2025:   \"With uncontrolled Asthma/ recurrent exacerbations/ peripheral eosinophilia will initiate biological agents- prescription for Dupixent sent  Continue breztri 2 puffs 2 times daily. Compliant  Not tolerating budesonide nebs twice daily- hold for now on Breztri  May have to resume azithromycin three times weekly if uncontrolled on biological agents .   Serum allergies, IGE, aspergillus Ag with asthma component- Eos 320, - postive allergy testing  Continue Albuterol NEB 3-4 times per day and also every 4 hrs as needed until improved- using very frequently   Smoking cessation discussed - decreasing   Continue oxygen as ordered- -continue at 2L NC at rest and with exertion.  Recommended pulmonary rehab- re assess in future .   PFT in a year or early if needed from last   Based on PFT she can benefit from [place] valves but considering current smoking status will not qualify- will re assess   Continue activities as tolerates  Will follow in 3-4 months and or early if needed\"   - Follow-up with Eun Donovan at [facility] as per above plan.    Hypertension  - Uncontrolled on admission; with hypertensive urgency, SBP> 200 on presentation  - Continue home BP medications as able, adjusted as needed: We stopped ARB in view of elevated potassium  - Currently on amlodipine, chlorthalidone; monitor BPs and adjust meds as needed    Hyperkalemia  - Was noted with potassium of 6 range on admission  - Has been noted in previous hospitalizations with elevated potassium, possibly medication induced sec to ACEI/ARB  - Medical management of hyperkalemia: resolved  - Outpatient monitoring    Diabetes type 2; steroid-induced hyperglycemia  - Elevated blood sugar above baseline likely due to steroids.  Off steroids now; anticipate improvement in glycemic control and change in current med requirement  - While admitted, she is on corrective insulin

## 2025-05-22 VITALS
HEIGHT: 61 IN | RESPIRATION RATE: 22 BRPM | DIASTOLIC BLOOD PRESSURE: 78 MMHG | HEART RATE: 86 BPM | WEIGHT: 148.59 LBS | SYSTOLIC BLOOD PRESSURE: 126 MMHG | OXYGEN SATURATION: 97 % | BODY MASS INDEX: 28.05 KG/M2 | TEMPERATURE: 98 F

## 2025-05-22 LAB
ALBUMIN SERPL-MCNC: 3.7 G/DL (ref 3.4–5)
ANION GAP SERPL CALCULATED.3IONS-SCNC: 9 MMOL/L (ref 3–16)
BACTERIA UR CULT: NORMAL
BASOPHILS # BLD: 0.1 K/UL (ref 0–0.2)
BASOPHILS NFR BLD: 0.5 %
BUN SERPL-MCNC: 18 MG/DL (ref 7–20)
CALCIUM SERPL-MCNC: 9.1 MG/DL (ref 8.3–10.6)
CHLORIDE SERPL-SCNC: 95 MMOL/L (ref 99–110)
CO2 SERPL-SCNC: 31 MMOL/L (ref 21–32)
CREAT SERPL-MCNC: 0.8 MG/DL (ref 0.6–1.2)
DEPRECATED RDW RBC AUTO: 18.3 % (ref 12.4–15.4)
EOSINOPHIL # BLD: 0 K/UL (ref 0–0.6)
EOSINOPHIL NFR BLD: 0 %
GFR SERPLBLD CREATININE-BSD FMLA CKD-EPI: 82 ML/MIN/{1.73_M2}
GLUCOSE BLD-MCNC: 147 MG/DL (ref 70–99)
GLUCOSE BLD-MCNC: 148 MG/DL (ref 70–99)
GLUCOSE BLD-MCNC: 172 MG/DL (ref 70–99)
GLUCOSE BLD-MCNC: 228 MG/DL (ref 70–99)
GLUCOSE SERPL-MCNC: 178 MG/DL (ref 70–99)
HCT VFR BLD AUTO: 35.2 % (ref 36–48)
HGB BLD-MCNC: 11.6 G/DL (ref 12–16)
LYMPHOCYTES # BLD: 2.4 K/UL (ref 1–5.1)
LYMPHOCYTES NFR BLD: 23.9 %
MAGNESIUM SERPL-MCNC: 1.94 MG/DL (ref 1.8–2.4)
MCH RBC QN AUTO: 24.2 PG (ref 26–34)
MCHC RBC AUTO-ENTMCNC: 33 G/DL (ref 31–36)
MCV RBC AUTO: 73.4 FL (ref 80–100)
MONOCYTES # BLD: 1.4 K/UL (ref 0–1.3)
MONOCYTES NFR BLD: 14.2 %
NEUTROPHILS # BLD: 6.2 K/UL (ref 1.7–7.7)
NEUTROPHILS NFR BLD: 61.4 %
PERFORMED ON: ABNORMAL
PHOSPHATE SERPL-MCNC: 3.2 MG/DL (ref 2.5–4.9)
PLATELET # BLD AUTO: 281 K/UL (ref 135–450)
PMV BLD AUTO: 7.8 FL (ref 5–10.5)
POTASSIUM SERPL-SCNC: 3.8 MMOL/L (ref 3.5–5.1)
RBC # BLD AUTO: 4.8 M/UL (ref 4–5.2)
SODIUM SERPL-SCNC: 135 MMOL/L (ref 136–145)
WBC # BLD AUTO: 10.1 K/UL (ref 4–11)

## 2025-05-22 PROCEDURE — 99232 SBSQ HOSP IP/OBS MODERATE 35: CPT | Performed by: INTERNAL MEDICINE

## 2025-05-22 PROCEDURE — 6360000002 HC RX W HCPCS: Performed by: INTERNAL MEDICINE

## 2025-05-22 PROCEDURE — 36415 COLL VENOUS BLD VENIPUNCTURE: CPT

## 2025-05-22 PROCEDURE — 2500000003 HC RX 250 WO HCPCS: Performed by: INTERNAL MEDICINE

## 2025-05-22 PROCEDURE — 6370000000 HC RX 637 (ALT 250 FOR IP)

## 2025-05-22 PROCEDURE — 6360000002 HC RX W HCPCS

## 2025-05-22 PROCEDURE — 94761 N-INVAS EAR/PLS OXIMETRY MLT: CPT

## 2025-05-22 PROCEDURE — 85025 COMPLETE CBC W/AUTO DIFF WBC: CPT

## 2025-05-22 PROCEDURE — 80069 RENAL FUNCTION PANEL: CPT

## 2025-05-22 PROCEDURE — 83735 ASSAY OF MAGNESIUM: CPT

## 2025-05-22 PROCEDURE — 2700000000 HC OXYGEN THERAPY PER DAY

## 2025-05-22 PROCEDURE — 94640 AIRWAY INHALATION TREATMENT: CPT

## 2025-05-22 RX ORDER — CIPROFLOXACIN 500 MG/1
500 TABLET, FILM COATED ORAL 2 TIMES DAILY
Qty: 4 TABLET | Refills: 0 | Status: SHIPPED | OUTPATIENT
Start: 2025-05-22 | End: 2025-05-24

## 2025-05-22 RX ORDER — CHLORTHALIDONE 25 MG/1
12.5 TABLET ORAL DAILY
Qty: 15 TABLET | Refills: 3 | Status: SHIPPED | OUTPATIENT
Start: 2025-05-22

## 2025-05-22 RX ORDER — AMLODIPINE BESYLATE 5 MG/1
5 TABLET ORAL NIGHTLY
Qty: 30 TABLET | Refills: 2 | Status: SHIPPED | OUTPATIENT
Start: 2025-05-22

## 2025-05-22 RX ADMIN — CHLORTHALIDONE 25 MG: 25 TABLET ORAL at 08:09

## 2025-05-22 RX ADMIN — IPRATROPIUM BROMIDE AND ALBUTEROL SULFATE 1 DOSE: .5; 3 SOLUTION RESPIRATORY (INHALATION) at 08:28

## 2025-05-22 RX ADMIN — ACETAMINOPHEN 650 MG: 325 TABLET ORAL at 08:08

## 2025-05-22 RX ADMIN — INSULIN LISPRO 4 UNITS: 100 INJECTION, SOLUTION INTRAVENOUS; SUBCUTANEOUS at 11:49

## 2025-05-22 RX ADMIN — ENOXAPARIN SODIUM 40 MG: 100 INJECTION SUBCUTANEOUS at 08:09

## 2025-05-22 RX ADMIN — POLYETHYLENE GLYCOL 3350 17 G: 17 POWDER, FOR SOLUTION ORAL at 08:08

## 2025-05-22 RX ADMIN — ONDANSETRON 4 MG: 4 TABLET, ORALLY DISINTEGRATING ORAL at 15:03

## 2025-05-22 RX ADMIN — ROSUVASTATIN CALCIUM 20 MG: 20 TABLET, FILM COATED ORAL at 08:09

## 2025-05-22 RX ADMIN — WATER 1000 MG: 1 INJECTION INTRAMUSCULAR; INTRAVENOUS; SUBCUTANEOUS at 15:03

## 2025-05-22 RX ADMIN — BUDESONIDE 500 MCG: 0.5 SUSPENSION RESPIRATORY (INHALATION) at 08:28

## 2025-05-22 RX ADMIN — ARFORMOTEROL TARTRATE 15 MCG: 15 SOLUTION RESPIRATORY (INHALATION) at 08:29

## 2025-05-22 ASSESSMENT — PAIN SCALES - GENERAL
PAINLEVEL_OUTOF10: 3
PAINLEVEL_OUTOF10: 3
PAINLEVEL_OUTOF10: 0
PAINLEVEL_OUTOF10: 1
PAINLEVEL_OUTOF10: 0
PAINLEVEL_OUTOF10: 1
PAINLEVEL_OUTOF10: 1

## 2025-05-22 ASSESSMENT — PAIN DESCRIPTION - DESCRIPTORS: DESCRIPTORS: ACHING

## 2025-05-22 ASSESSMENT — PAIN - FUNCTIONAL ASSESSMENT: PAIN_FUNCTIONAL_ASSESSMENT: ACTIVITIES ARE NOT PREVENTED

## 2025-05-22 ASSESSMENT — PAIN DESCRIPTION - ORIENTATION: ORIENTATION: MID

## 2025-05-22 ASSESSMENT — PAIN DESCRIPTION - LOCATION: LOCATION: HEAD

## 2025-05-22 NOTE — PROGRESS NOTES
Nephrology Progress Note                                                                                                                                                                                                                                                                                                                                                               Office : [phone]     Fax :[phone]    Patient's Name: Susanne Jason  9:18 AM  5/22/2025    Reason for Consult:  Hyperkalemia   Requesting Physician:  No primary care provider on file.  Chief Complaint:    Chief Complaint   Patient presents with    Respiratory Distress     Pt been having difficulty breathing since this morning, on 3 lpm oxygen at baseline but spo2 is just 75%        Assessment/Plan     # Hyperkalemia - Resolved  - S/p Lasix 20 mg x 1. S/p IVF  - Back on a regular diet   - Holding ARB  - Monitor     # Acute on chronic hypoxic respiratory failure  # COPD exacerbation  - Intubated on 5/15. S/p extubation on 5/19  - On abx and steroids   - Pulmonary on board    # Hypotension   - Off meds  - BP elevated now. Added CCB   - Monitor     # DM2  - Insulin management per primary       History of Present Ilness:    Susanne Jason is a 65 y.o. female with a PMH of COPD, chronic respiratory failure, HLD, HTN, and DM2, who presents with acute on chronic hypoxic respiratory failure secondary to COPD exacerbation. We are now being consulted for hyperkalemia.     Interval hx:    Feeling OK   Potassium in good range  Creatinine stable  BP's improved  On O2      Past Medical History:   Diagnosis Date    Asthma     COPD (chronic obstructive pulmonary disease) (HCC)     Diabetes mellitus (HCC)     GERD (gastroesophageal reflux disease)        Past Surgical History:   Procedure Laterality Date    COLONOSCOPY      LAPAROSCOPY      OTHER SURGICAL HISTORY  1/9/2015    PLANTAR FASCIOTOMY RIGHT FOOT; STEROID INJECTION LEFT HEEL    OTHER  PRN  lidocaine 4 % external patch 1 patch, Daily  rosuvastatin (CRESTOR) tablet 20 mg, Daily  arformoterol tartrate (BROVANA) nebulizer solution 15 mcg, BID RT  hydrALAZINE (APRESOLINE) injection 10 mg, Q8H PRN  hydrOXYzine HCl (ATARAX) tablet 10 mg, TID PRN      Physical exam:     Vitals:  /76   Pulse 82   Temp 98 °F (36.7 °C) (Temporal)   Resp 20   Ht 1.549 m (5' 1\")   Wt 67.4 kg (148 lb 9.4 oz)   LMP 01/09/2013   SpO2 96%   BMI 28.08 kg/m²   Constitutional: Alert and oriented, NAD   Skin: no rash, turgor wnl  Heent:  eomi, mmm  Neck: no bruits or jvd noted  Cardiovascular:  S1, S2 without m/r/g  Respiratory: Wheezing. + supplemental O2  Abdomen:  soft, nt, nd  Ext:  lower extremity edema No  Psychiatric: mood and affect difficult to assess   Musculoskeletal:  Rom, muscular strength intact    Data:   Labs:  CBC:   Recent Labs     05/20/25  0551 05/21/25  0451 05/22/25  0448   WBC 15.3* 11.3* 10.1   HGB 12.5 11.4* 11.6*    300 281     BMP:    Recent Labs     05/20/25  0551 05/21/25  0451 05/22/25  0448    138 135*   K see below 4.1 3.8   CL 96* 96* 95*   CO2 25 31 31   BUN 17 23* 18   CREATININE 0.7 0.7 0.8   GLUCOSE 121* 136* 178*     Ca/Mg/Phos:   Recent Labs     05/20/25  0551 05/21/25  0451 05/22/25  0448   CALCIUM 10.0 9.5 9.1   MG 2.05 1.99 1.94   PHOS 3.6 3.7 3.2     Hepatic: No results for input(s): \"AST\", \"ALT\", \"BILITOT\", \"ALKPHOS\" in the last 72 hours.    Invalid input(s): \"ALB\"  Troponin: No results for input(s): \"TROPONINI\" in the last 72 hours.  BNP: No results for input(s): \"BNP\" in the last 72 hours.  Lipids: No results for input(s): \"CHOL\", \"TRIG\", \"HDL\" in the last 72 hours.    Invalid input(s): \"LDLCALC\", \"LABVLDL\"  ABGs:   Recent Labs     05/20/25  0835   PHART 7.398   PO2ART 95.6   CEV5CCH 55.2*     INR: No results for input(s): \"INR\" in the last 72 hours.  UA:  Recent Labs     05/21/25  1351   COLORU Yellow   CLARITYU Clear   GLUCOSEU Negative   BILIRUBINUR Negative

## 2025-05-22 NOTE — CARE COORDINATION
Case Management Assessment            Discharge Note                    Date / Time of Note: 5/22/2025 2:10 PM                  Discharge Note Completed by: Felisha De Jesus RN    Patient Name: Susanne Jason   YOB: 1960  Diagnosis: COPD (chronic obstructive pulmonary disease) (HCC) [J44.9]  COPD exacerbation (HCC) [J44.1]  Acute on chronic respiratory failure with hypoxia and hypercapnia (HCC) [J96.21, J96.22]   Date / Time: 5/13/2025 11:24 PM    Current PCP: Moncho Gutierrez MD  Clinic patient: Yes    Hospitalization in the last 30 days: No       Advance Directives:  Code Status: Full Code  [place] DNR form completed and on chart: Not Indicated    Financial:  Payor: [facility] MEDICAID / Plan: [facility] MEDICAID / Product Type: *No Product type* /      Pharmacy:    [id number] [place], [place] - [address]  - P [phone] - F [phone]  [facility] [facility] [address]  Phone: [phone] Fax: [phone]    [facility] [id number] [address] [facility] -  [phone] - F [phone]  [address] [facility] [address]  Phone: [phone] Fax: [phone]    [facility] #[id number] - [place], [place] - [address] - P [phone] - F [phone]  [address]  [facility] [address]  Phone: [phone] Fax: [phone]    [facility]/pharmacy #[id number] - [place] - [address] [place] [address] - P [phone] - F [phone]  [address]  [place] [address]  Phone: [phone] Fax: [phone]      Assistance purchasing medications?: Potential Assistance Purchasing Medications: No  Assistance provided by Case Management: None at this time    Does patient want to participate in local refill/ meds to beds program?:      Meds To Beds General Rules:  1. Can ONLY be done Monday- Friday between 8:30am-5pm  2. Prescription(s) must be in pharmacy by 3pm to be filled same day  3.Copy of patient's insurance/  prescription drug card and patient face sheet must be sent along with the prescription(s)  4. Cost of Rx cannot be added to hospital bill. If financial assistance is needed, please contact unit  or ;  or  CANNOT provide pharmacy voucher for patients co-pays  5. Patients can then  the prescription on their way out of the hospital at discharge, or pharmacy can deliver to the bedside if staff is available. (payment due at time of pick-up or delivery - cash, check, or card accepted)     Able to afford home medications/ co-pay costs: Yes    ADLS:  Current PT AM-PAC Score: 17 /24  Current OT AM-PAC Score: 20 /24    DISCHARGE Disposition: Home with Home Health Care: [facility]     LOC at discharge: Not Applicable  KATIE Completed: Not Indicated    Notification completed in HENS/PAS?:  Not Applicable    IMM Completed:   Not Indicated due to: medicaid         Transportation:  Transportation PLAN for discharge: family   Mode of Transport: Private Car  Reason for medical transport:     Home Care:  Home Care ordered at discharge: Yes  Home Care Agency: [facility]  Phone: [phone]  Fax: [phone]  Orders faxed: Yes    Durable Medical Equipment:  DME Provider:   Equipment obtained during hospitalization: transfer bath bench    Home Oxygen and Respiratory Equipment:  Oxygen needed at discharge?: Yes  Home Oxygen Company: [employer] Phone: [phone]   Portable tank available for discharge?: Yes    Dialysis:  Dialysis patient: No    Additional CM Notes: Patient will go home with SO (SO transporting home). Patient asked for Stop Smoking info and put on KATIE. Patient had transfer bath bench delivered to room and will go home with [facility] home care.     COVID Result:    Lab Results   Component Value Date/Time    COVID19 NOT DETECTED 05/13/2025 11:38 PM       The Plan for Transition of Care is related to the following treatment goals of COPD (chronic obstructive

## 2025-05-22 NOTE — PLAN OF CARE
Problem: Chronic Conditions and Co-morbidities  Goal: Patient's chronic conditions and co-morbidity symptoms are monitored and maintained or improved  5/22/2025 1443 by Rober Betancourt RN  Outcome: Progressing     Problem: Discharge Planning  Goal: Discharge to home or other facility with appropriate resources  5/22/2025 1443 by Rober Betancourt RN  Outcome: Progressing     Problem: Safety - Adult  Goal: Free from fall injury  5/22/2025 1443 by Rober Betancourt RN  Outcome: Progressing     Problem: Pain  Goal: Verbalizes/displays adequate comfort level or baseline comfort level  5/22/2025 1443 by Rober Betancourt RN  Outcome: Progressing  Flowsheets (Taken 5/22/2025 1200)  Verbalizes/displays adequate comfort level or baseline comfort level: Assess pain using appropriate pain scale     Problem: ABCDS Injury Assessment  Goal: Absence of physical injury  5/22/2025 1443 by Rober Betancourt RN  Outcome: Progressing     Problem: Skin/Tissue Integrity  Goal: Skin integrity remains intact  Description: 1.  Monitor for areas of redness and/or skin breakdown2.  Assess vascular access sites hourly3.  Every 4-6 hours minimum:  Change oxygen saturation probe site4.  Every 4-6 hours:  If on nasal continuous positive airway pressure, respiratory therapy assess nares and determine need for appliance change or resting period  5/22/2025 1443 by Rober Betancourt RN  Outcome: Progressing     Problem: Nutrition Deficit:  Goal: Optimize nutritional status  5/22/2025 1443 by Rober Betancourt, RN  Outcome: Progressing

## 2025-05-22 NOTE — PLAN OF CARE
Problem: Chronic Conditions and Co-morbidities  Goal: Patient's chronic conditions and co-morbidity symptoms are monitored and maintained or improved  5/22/2025 0116 by Caro Crane RN  Outcome: Progressing  Flowsheets (Taken 5/22/2025 0000)  Care Plan - Patient's Chronic Conditions and Co-Morbidity Symptoms are Monitored and Maintained or Improved:   Update acute care plan with appropriate goals if chronic or comorbid symptoms are exacerbated and prevent overall improvement and discharge   Collaborate with multidisciplinary team to address chronic and comorbid conditions and prevent exacerbation or deterioration   Monitor and assess patient's chronic conditions and comorbid symptoms for stability, deterioration, or improvement  5/21/2025 2030 by Reva Price RN  Outcome: Progressing  5/21/2025 1119 by Stella Trejo RN  Outcome: Progressing     Problem: Discharge Planning  Goal: Discharge to home or other facility with appropriate resources  5/22/2025 0116 by Caro Crane RN  Outcome: Progressing  Flowsheets (Taken 5/22/2025 0000)  Discharge to home or other facility with appropriate resources:   Identify barriers to discharge with patient and caregiver   Arrange for needed discharge resources and transportation as appropriate   Identify discharge learning needs (meds, wound care, etc)   Refer to discharge planning if patient needs post-hospital services based on physician order or complex needs related to functional status, cognitive ability or social support system  5/21/2025 2030 by Reva Price RN  Outcome: Progressing  5/21/2025 1119 by Stella Trejo, RN  Outcome: Progressing     Problem: Safety - Adult  Goal: Free from fall injury  5/22/2025 0116 by Caro Crane RN  Outcome: Progressing  5/21/2025 2030 by Reva Price, RN  Outcome: Progressing  5/21/2025 1119 by Stella Trejo RN  Outcome: Progressing     Problem: Pain  Goal: Verbalizes/displays adequate comfort level or

## 2025-05-22 NOTE — PLAN OF CARE
Problem: Chronic Conditions and Co-morbidities  Goal: Patient's chronic conditions and co-morbidity symptoms are monitored and maintained or improved  5/21/2025 2030 by Reva Price RN  Outcome: Progressing     Problem: Discharge Planning  Goal: Discharge to home or other facility with appropriate resources  5/21/2025 2030 by Reva Price RN  Outcome: Progressing     Problem: Safety - Adult  Goal: Free from fall injury  5/21/2025 2030 by Reva Price RN  Outcome: Progressing     Problem: Pain  Goal: Verbalizes/displays adequate comfort level or baseline comfort level  5/21/2025 2030 by Reva Price RN  Outcome: Progressing     Problem: ABCDS Injury Assessment  Goal: Absence of physical injury  5/21/2025 2030 by Reva Price RN  Outcome: Progressing     Problem: Skin/Tissue Integrity  Goal: Skin integrity remains intact  Description: 1.  Monitor for areas of redness and/or skin breakdown2.  Assess vascular access sites hourly3.  Every 4-6 hours minimum:  Change oxygen saturation probe site4.  Every 4-6 hours:  If on nasal continuous positive airway pressure, respiratory therapy assess nares and determine need for appliance change or resting period  5/21/2025 2030 by Reva Price, RN  Outcome: Progressing

## 2025-05-22 NOTE — PROGRESS NOTES
Palliative Care Chart Review  and Check in Note:     NAME:  Susanne Jason  Admit Date: 5/13/2025  Hospital Day:  Hospital Day: 10   Current Code status: Full Code    Palliative care is continuing to following Ms. Jason for symptom management,  and goals of care discussion as needed. Patient's chart reviewed today 5/22/25.      Saw pt at the bedside. She was sitting up eating breakfast. Respirations even/unlabored on 3LNC. She endorsed that her breathing is much improved today. She denied any complaints or concerns.       The following are the currently established goals/code status, and Symptom management.     Goals of care:  We completed a HCPOA 5/14 and she named her long term partner Abebe Hickschandlerelvis as the primary agent, daughter Lisbeth as the first alternate agent and daughter Dora as the second alternate agent.  Pt wants to avoid intubation but is OK with it if necessary.  Her goals are life prolonging. We discussed possible need for trach in the future if her breathing declines again. Pt reported she needs to think about this and will discuss with Abebe.     Code status: Full code, discussed with pt in depth 5/14.     Discharge plan: TBD pending hospital course       HENRIQUE Singh - CNP  05/22/25  11:22 AM

## 2025-05-22 NOTE — RT PROTOCOL NOTE
RT Nebulizer Bronchodilator Protocol Note    There is a bronchodilator order in the chart from a provider indicating to follow the RT Bronchodilator Protocol and there is an “Initiate RT Bronchodilator Protocol” order as well (see protocol at bottom of note).    CXR Findings:  No results found.    The findings from the last RT Protocol Assessment were as follows:  Smoking: Chronic pulmonary disease  Respiratory Pattern: Regular pattern and RR 12-20 bpm  Breath Sounds: Intermittent or unilateral wheezes  Cough: Strong, spontaneous, non-productive  Indication for Bronchodilator Therapy: On home bronchodilators  Bronchodilator Assessment Score: 6  Pt did not want to get duonebs more than BID  Aerosolized bronchodilator medication orders have been revised according to the RT Nebulizer Bronchodilator Protocol below.    Respiratory Therapist to perform RT Therapy Protocol Assessment initially then follow the protocol.  Repeat RT Therapy Protocol Assessment PRN for score 0-3 or on second treatment, BID, and PRN for scores above 3.    No Indications - adjust the frequency to every 6 hours PRN wheezing or bronchospasm, if no treatments needed after 48 hours then discontinue using Per Protocol order mode.     If indication present, adjust the RT bronchodilator orders based on the Bronchodilator Assessment Score as indicated below.  If a patient is on this medication at home then do not decrease Frequency below that used at home.    0-3 - enter or revise RT bronchodilator order(s) to equivalent RT Bronchodilator order with Frequency of every 4 hours PRN for wheezing or increased work of breathing using Per Protocol order mode.       4-6 - enter or revise RT Bronchodilator order(s) to two equivalent RT bronchodilator orders with one order with BID Frequency and one order with Frequency of every 4 hours PRN wheezing or increased work of breathing using Per Protocol order mode.         7-10 - enter or revise RT Bronchodilator

## 2025-05-22 NOTE — DISCHARGE SUMMARY
Hospital Discharge Summary    Patient's PCP: No primary care provider on file.  Admit Date: 5/13/2025   Discharge Date: 5/22/2025    Admitting Physician: Dr. Chris Lion MD  Discharge Physician: Dr. Adrián Hernandez MD   Consults: pulmonary/intensive care    HPI: \" 65 y.o. female with past medical history of chronic hypoxic respiratory failure on 3 L nasal cannula at baseline, COPD, asthma, tobacco dependence, diabetes, hypertension, hyperlipidemia, surgery for perforated gastric ulcer 2016, osteoarthritis-      She is limited in the history due to BiPAP  She is quite dyspneic and wheezy  She presents with about 2 to 3-day history of worsening shortness of breath and cough     On ED arrival she was noted to be profoundly hypoxemic at 75% on 3 L nasal cannula and gas is consistent with upper respiratory acidosis with PCO above 80\"      Brief hospital course:  Given the concern of the patients presentation and the concern of the possible multi-factorial etiology contributing to patients symptomatology. Patient was admitted and evaluated and found to have:     Discharge Diagnoses:       Acute on chronic hypoxic respiratory failure with acute hypercarbic respiratory failure: Present on admission  - Patient hypoxic in the ED needing 15 L of oxygen to maintain sats greater than 97%.  On presentation patient had a pH of 7.2, with CO2 of 80 however she progressively got worse her pH dropped to 7.09 with CO2 greater than 98 and needed to be on BiPAP.   - On 3 L oxygen chronically, for asthma/COPD  - Placed on BiPAP on admission and then high flow nasal cannula; back on bipap which reportedly was kept on most of the night after periods of patient refusal to wear: Appears this has been noted in previous hospitalizations  - Required tx to ICU and subsequent intubation Hosp Day 1: 5/16/25  - Bronchoscopy performed on 5/15/25: Minimal tracheal secretions, severe tracheomalacia with 90% narrowing during coughin  - Managed for

## 2025-05-30 NOTE — PROGRESS NOTES
Physician Progress Note      PATIENT:               BRUNILDA MOTT  [facility] #:                  [id number]  :                       1960  ADMIT DATE:       2025 11:24 PM  DISCH DATE:        2025 6:05 PM  RESPONDING  PROVIDER #:        Adrián Hernandez MD          QUERY TEXT:    Dear Dr. Chavez,  Sepsis is documented in the medical record in medical pn and poss sepsis in   discharge summary. Please provide additional clinical indicators supportive of   your documentation. Or please document if the diagnosis of sepsis has been   ruled out after study.    The clinical indicators include:  66 yo f presents with sob, diff breathing, 3L 02 at baseline but sp02 is   75%-placed on Bipap  -Asthma, COPD  - Temp: 97.2 ?F (36.2 ?C), Pulse: 99, Respirations: 24,  -WBC 9.4 on admission then trended up to15.3 after introduction of steroids  -Per med pn dated  documentation of, 'Complicated UTI with possible   sepsis'.  -Per discharge summary dated  of, 'Complicated UTI with possible sepsis,   discharged empirically on Cipro'.  -BiPaP, IV solumedrol intubation, Pulmonary consult, monitoring in ICU   Albuterol, bronchoscopy counseled on smoking cessation, mag sulfate,   azithromycin    Thank you, in advance,  Lulu Evangelista RN BSN CRCR  Clinical   mary [email or web address]  Options provided:  -- Sepsis not POA, as evidenced by, Please document evidence.  -- Sepsis was ruled out after study  -- Other - I will add my own diagnosis  -- Disagree - Not applicable / Not valid  -- Disagree - Clinically unable to determine / Unknown  -- Refer to Clinical Documentation Reviewer    PROVIDER RESPONSE TEXT:    Sepsis not POA as evidenced by As evidenced by no evidence of sepsis on   presentation.    Query created by: Lulu Evangelista on 2025 11:23 AM      Electronically signed by:  Adrián Hernandez MD 2025 5:53 PM